# Patient Record
Sex: FEMALE | Race: WHITE | NOT HISPANIC OR LATINO | Employment: OTHER | URBAN - METROPOLITAN AREA
[De-identification: names, ages, dates, MRNs, and addresses within clinical notes are randomized per-mention and may not be internally consistent; named-entity substitution may affect disease eponyms.]

---

## 2017-06-26 ENCOUNTER — OFFICE VISIT (OUTPATIENT)
Dept: INTERNAL MEDICINE | Facility: CLINIC | Age: 72
End: 2017-06-26
Payer: MEDICARE

## 2017-06-26 ENCOUNTER — LAB VISIT (OUTPATIENT)
Dept: LAB | Facility: HOSPITAL | Age: 72
End: 2017-06-26
Attending: INTERNAL MEDICINE
Payer: MEDICARE

## 2017-06-26 VITALS
BODY MASS INDEX: 28 KG/M2 | RESPIRATION RATE: 14 BRPM | DIASTOLIC BLOOD PRESSURE: 62 MMHG | SYSTOLIC BLOOD PRESSURE: 118 MMHG | HEIGHT: 67 IN | WEIGHT: 178.38 LBS | HEART RATE: 81 BPM | TEMPERATURE: 98 F

## 2017-06-26 DIAGNOSIS — K52.9 GE (GASTROENTERITIS): Primary | ICD-10-CM

## 2017-06-26 DIAGNOSIS — Z00.00 ANNUAL PHYSICAL EXAM: ICD-10-CM

## 2017-06-26 DIAGNOSIS — K52.9 GE (GASTROENTERITIS): ICD-10-CM

## 2017-06-26 PROCEDURE — 87209 SMEAR COMPLEX STAIN: CPT

## 2017-06-26 PROCEDURE — 87046 STOOL CULTR AEROBIC BACT EA: CPT

## 2017-06-26 PROCEDURE — 99397 PER PM REEVAL EST PAT 65+ YR: CPT | Mod: S$GLB,,, | Performed by: INTERNAL MEDICINE

## 2017-06-26 PROCEDURE — 1159F MED LIST DOCD IN RCRD: CPT | Mod: S$GLB,,, | Performed by: INTERNAL MEDICINE

## 2017-06-26 PROCEDURE — 87427 SHIGA-LIKE TOXIN AG IA: CPT

## 2017-06-26 PROCEDURE — 87045 FECES CULTURE AEROBIC BACT: CPT

## 2017-06-26 PROCEDURE — 99999 PR PBB SHADOW E&M-EST. PATIENT-LVL III: CPT | Mod: PBBFAC,,, | Performed by: INTERNAL MEDICINE

## 2017-06-26 PROCEDURE — 87449 NOS EACH ORGANISM AG IA: CPT

## 2017-06-26 PROCEDURE — 1126F AMNT PAIN NOTED NONE PRSNT: CPT | Mod: S$GLB,,, | Performed by: INTERNAL MEDICINE

## 2017-06-27 ENCOUNTER — LAB VISIT (OUTPATIENT)
Dept: LAB | Facility: HOSPITAL | Age: 72
End: 2017-06-27
Attending: INTERNAL MEDICINE
Payer: MEDICARE

## 2017-06-27 DIAGNOSIS — Z00.00 ANNUAL PHYSICAL EXAM: ICD-10-CM

## 2017-06-27 LAB
ALBUMIN SERPL BCP-MCNC: 3.3 G/DL
ALP SERPL-CCNC: 80 U/L
ALT SERPL W/O P-5'-P-CCNC: 16 U/L
ANION GAP SERPL CALC-SCNC: 7 MMOL/L
AST SERPL-CCNC: 19 U/L
BASOPHILS # BLD AUTO: 0.05 K/UL
BASOPHILS NFR BLD: 0.8 %
BILIRUB SERPL-MCNC: 0.4 MG/DL
BUN SERPL-MCNC: 13 MG/DL
C DIFF GDH STL QL: NEGATIVE
C DIFF TOX A+B STL QL IA: NEGATIVE
CALCIUM SERPL-MCNC: 9.4 MG/DL
CHLORIDE SERPL-SCNC: 102 MMOL/L
CHOLEST/HDLC SERPL: 3.3 {RATIO}
CO2 SERPL-SCNC: 27 MMOL/L
CREAT SERPL-MCNC: 0.9 MG/DL
DIFFERENTIAL METHOD: ABNORMAL
EOSINOPHIL # BLD AUTO: 0.1 K/UL
EOSINOPHIL NFR BLD: 1.8 %
ERYTHROCYTE [DISTWIDTH] IN BLOOD BY AUTOMATED COUNT: 13.3 %
EST. GFR  (AFRICAN AMERICAN): >60 ML/MIN/1.73 M^2
EST. GFR  (NON AFRICAN AMERICAN): >60 ML/MIN/1.73 M^2
GLUCOSE SERPL-MCNC: 100 MG/DL
HCT VFR BLD AUTO: 40.2 %
HDL/CHOLESTEROL RATIO: 30.1 %
HDLC SERPL-MCNC: 236 MG/DL
HDLC SERPL-MCNC: 71 MG/DL
HGB BLD-MCNC: 13.9 G/DL
LDLC SERPL CALC-MCNC: 139.8 MG/DL
LYMPHOCYTES # BLD AUTO: 2.1 K/UL
LYMPHOCYTES NFR BLD: 34.5 %
MCH RBC QN AUTO: 31.4 PG
MCHC RBC AUTO-ENTMCNC: 34.6 %
MCV RBC AUTO: 91 FL
MONOCYTES # BLD AUTO: 0.5 K/UL
MONOCYTES NFR BLD: 8.2 %
NEUTROPHILS # BLD AUTO: 3.2 K/UL
NEUTROPHILS NFR BLD: 54.5 %
NONHDLC SERPL-MCNC: 165 MG/DL
O+P STL TRI STN: NORMAL
PLATELET # BLD AUTO: 250 K/UL
PMV BLD AUTO: 10.2 FL
POTASSIUM SERPL-SCNC: 5 MMOL/L
PROT SERPL-MCNC: 7.1 G/DL
RBC # BLD AUTO: 4.43 M/UL
SODIUM SERPL-SCNC: 136 MMOL/L
T4 FREE SERPL-MCNC: 0.93 NG/DL
TRIGL SERPL-MCNC: 126 MG/DL
TSH SERPL DL<=0.005 MIU/L-ACNC: 3.77 UIU/ML
WBC # BLD AUTO: 5.95 K/UL

## 2017-06-27 PROCEDURE — 80061 LIPID PANEL: CPT

## 2017-06-27 PROCEDURE — 85025 COMPLETE CBC W/AUTO DIFF WBC: CPT

## 2017-06-27 PROCEDURE — 84443 ASSAY THYROID STIM HORMONE: CPT

## 2017-06-27 PROCEDURE — 36415 COLL VENOUS BLD VENIPUNCTURE: CPT | Mod: PO

## 2017-06-27 PROCEDURE — 80053 COMPREHEN METABOLIC PANEL: CPT

## 2017-06-27 PROCEDURE — 84439 ASSAY OF FREE THYROXINE: CPT

## 2017-06-27 NOTE — PROGRESS NOTES
Subjective:       Patient ID: Mireille Akins is a 71 y.o. female.    Chief Complaint: Abdominal Pain (odor, cramping, about 2 weeks, went out of the country) and Fatigue  SUBJECTIVE:  A 71-year-old white female patient, known to me, who comes in with   a history of a recent trip to [facility] for about two weeks.  While she was there,   she developed some abdominal discomfort as did two other people on the trip with   her.  Their problems have now cleared after a few days.  Hers, however, have   continued.  She has had abdominal cramping.  She has had loose bowel movements,   not really diarrhea.  She has had cramping just prior to that.  She also has   noticed her energy has been down.  This has gone on now for 10 days.  She had   chills for a few days.  She is not aware of any fever.  There is no skin rash.    The stool does not have any blood.  I saw her  the other day.  He was   back with her today.  He is complaining of feeling poorly, but he had had a   sinobronchial infection associated with the travels, which is now clearing.  It   sounds like his problems are all related to that and inner ear problems.  The   patient has brought in her stool for evaluation.    PHYSICAL EXAMINATION:  VITAL SIGNS:  Normal including temperature.  GENERAL:  She looks healthy.  SKIN:  Shows no rash.  She has fair skin.  CHEST:  Clear.  ABDOMEN:  Not distended, tender.  Bowel sounds are active.  There is no fullness   or guarding and I probed deeply.    IMPRESSION:  Probable gastroenteritis.  Origin of that is uncertain.  With that   having gone on now for 10 days, I have set her up for a stool evaluation for   culture, ova and parasites and C. difficile.  She has not been on any   antibiotics for this.  She used Imodium on one occasion, I told her not to do   that, to be on an elemental diet.  She is soon due for her annual physical, so I   have set her up for lab for that, which will also cover this current  vent.      JDC/HN  dd: 06/26/2017 23:21:09 (CDT)  td: 07/13/2017 00:17:12 (CDT)  Doc ID   #[id number]  Job ID #[id number]    CC:     [facility] [id number]  [facility]  Review of Systems   Constitutional: Positive for chills and fatigue.   HENT: Negative for congestion, hearing loss, sinus pressure, sneezing, sore throat and voice change.    Eyes: Negative for visual disturbance.   Respiratory: Negative for cough, chest tightness and shortness of breath.    Cardiovascular: Negative.  Negative for chest pain, palpitations and leg swelling.   Gastrointestinal: Positive for abdominal distention, abdominal pain and diarrhea.   Genitourinary: Negative for difficulty urinating, dysuria, flank pain, frequency, hematuria, menstrual problem, pelvic pain, urgency, vaginal bleeding and vaginal discharge.   Musculoskeletal: Negative.  Negative for neck pain and neck stiffness.   Skin: Negative.    Neurological: Negative.  Negative for dizziness, seizures, light-headedness, numbness and headaches.   Psychiatric/Behavioral: Negative for agitation, behavioral problems, confusion and sleep disturbance. The patient is not nervous/anxious.        Objective:      Physical Exam   Constitutional: She is oriented to person, place, and time. She appears well-developed and well-nourished.   Eyes: EOM are normal. Pupils are equal, round, and reactive to light.   Neck: Normal range of motion. Neck supple. No JVD present. No thyromegaly present.   Cardiovascular: Normal rate, regular rhythm, normal heart sounds and intact distal pulses.  Exam reveals no gallop.    No murmur heard.  Pulmonary/Chest: Breath sounds normal. She has no wheezes. She has no rales.   Abdominal: Soft. Bowel sounds are normal. She exhibits no mass. There is no tenderness.   Musculoskeletal: Normal range of motion.   Lymphadenopathy:     She has no cervical adenopathy.   Neurological: She is alert and oriented to person, place, and time. She has normal reflexes. No cranial nerve deficit.    Skin: No rash noted. No erythema.   Psychiatric: She has a normal mood and affect. Judgment normal.       Assessment:       1. GE (gastroenteritis)    2. Annual physical exam        Plan:

## 2017-06-28 ENCOUNTER — PATIENT MESSAGE (OUTPATIENT)
Dept: INTERNAL MEDICINE | Facility: CLINIC | Age: 72
End: 2017-06-28

## 2017-06-28 LAB
E COLI SXT1 STL QL IA: NEGATIVE
E COLI SXT2 STL QL IA: NEGATIVE

## 2017-06-29 ENCOUNTER — TELEPHONE (OUTPATIENT)
Dept: INTERNAL MEDICINE | Facility: CLINIC | Age: 72
End: 2017-06-29

## 2017-06-29 RX ORDER — METRONIDAZOLE 500 MG/1
500 TABLET ORAL 3 TIMES DAILY
Qty: 30 TABLET | Refills: 0 | Status: SHIPPED | OUTPATIENT
Start: 2017-06-29 | End: 2017-07-07

## 2017-06-29 NOTE — TELEPHONE ENCOUNTER
----- Message from Juan Ramon Marin MD sent at 6/29/2017  7:56 AM CDT -----  Stool studies neg, lab is normal but for slight high cholesterol, how is she doing?  If not well have her see me next week

## 2017-06-30 LAB — BACTERIA STL CULT: NORMAL

## 2017-07-05 ENCOUNTER — TELEPHONE (OUTPATIENT)
Dept: INTERNAL MEDICINE | Facility: CLINIC | Age: 72
End: 2017-07-05

## 2017-07-05 DIAGNOSIS — K52.9 GASTROENTERITIS: Primary | ICD-10-CM

## 2017-07-05 NOTE — TELEPHONE ENCOUNTER
----- Message from Kanwal Castorena sent at 7/5/2017  3:40 PM CDT -----  Contact: patient [phone]  Pt saw  2 weeks ago and is still having stomach issues that she would like to discuss with [place]. Pt is aware that  is not in clinic .

## 2017-07-05 NOTE — TELEPHONE ENCOUNTER
Not doing much better. Stools are same consistency.    Now nausea for 2-3 days  Off and on. still on flagyl.  Tried with and without food.  not much of a appetite.    What is next step?  See gastro?  Was she tested for giardia?  Is also on protonix.     Please advise asap.  Thanks adrian

## 2017-07-06 ENCOUNTER — TELEPHONE (OUTPATIENT)
Dept: GASTROENTEROLOGY | Facility: CLINIC | Age: 72
End: 2017-07-06

## 2017-07-06 NOTE — TELEPHONE ENCOUNTER
Ma spoke with adrian from dr simpson office and appt was scheduled for pt for this week , adrian stated that the phone person did not offer [place] location and was eager to get off the phone ,appt schedule per her request to go to [place] on 7/7 1040am

## 2017-07-06 NOTE — TELEPHONE ENCOUNTER
gastro dept helped me book her this Friday w/ edilberto gabriel, she saw her back in 2014.  I called patient and gave date/time/location and she expressed understanding

## 2017-07-06 NOTE — TELEPHONE ENCOUNTER
----- Message from Lou Kuhn sent at 7/6/2017 10:45 AM CDT -----  Contact: gilberto/dr saad simpson - [phone]  Gastroenteritis and diarrhea - wants pt seen this week or next week - please call gilberto/dr saad simpson - [phone]

## 2017-07-06 NOTE — TELEPHONE ENCOUNTER
Dr simpson called me and said to get her in with gastro dept asap.  I called gastro/lay and they are working on fitting her in.

## 2017-07-07 ENCOUNTER — OFFICE VISIT (OUTPATIENT)
Dept: GASTROENTEROLOGY | Facility: CLINIC | Age: 72
End: 2017-07-07
Payer: MEDICARE

## 2017-07-07 VITALS
WEIGHT: 177.5 LBS | HEIGHT: 67 IN | BODY MASS INDEX: 27.86 KG/M2 | SYSTOLIC BLOOD PRESSURE: 129 MMHG | DIASTOLIC BLOOD PRESSURE: 73 MMHG

## 2017-07-07 DIAGNOSIS — R19.5 LOOSE STOOLS: Primary | ICD-10-CM

## 2017-07-07 PROCEDURE — 99999 PR PBB SHADOW E&M-EST. PATIENT-LVL II: CPT | Mod: PBBFAC,,, | Performed by: NURSE PRACTITIONER

## 2017-07-07 PROCEDURE — 1126F AMNT PAIN NOTED NONE PRSNT: CPT | Mod: S$GLB,,, | Performed by: NURSE PRACTITIONER

## 2017-07-07 PROCEDURE — 1159F MED LIST DOCD IN RCRD: CPT | Mod: S$GLB,,, | Performed by: NURSE PRACTITIONER

## 2017-07-07 PROCEDURE — 99213 OFFICE O/P EST LOW 20 MIN: CPT | Mod: S$GLB,,, | Performed by: NURSE PRACTITIONER

## 2017-07-07 NOTE — LETTER
July 7, 2017      Juan Ramon Marin MD  [address]  [place] [address]           [facility]  [address]  [place] [address]  Phone: [phone]          Patient: Mireille Akins   MR Number: [id number]   YOB: 1945   Date of Visit: 7/7/2017       Dear Dr. Juan Ramon Marin:    Thank you for referring Mireille Akins to me for evaluation. Attached you will find relevant portions of my assessment and plan of care.    If you have questions, please do not hesitate to call me. I look forward to following Mireille Akins along with you.    Sincerely,    Jenn Roper, [facility]    Enclosure  CC:  No Recipients    If you would like to receive this communication electronically, please contact [email or web address] or [phone] to request more information on [employer] Link access.    For providers and/or their staff who would like to refer a patient to [facility], please contact us through our one-stop-shop provider referral line, [facility], at [phone].    If you feel you have received this communication in error or would no longer like to receive these types of communications, please e-mail [email or web address]

## 2017-07-15 RX ORDER — PANTOPRAZOLE SODIUM 40 MG/1
TABLET, DELAYED RELEASE ORAL
Qty: 90 TABLET | Refills: 3 | Status: SHIPPED | OUTPATIENT
Start: 2017-07-15 | End: 2018-06-29 | Stop reason: SDUPTHER

## 2018-01-15 ENCOUNTER — PATIENT MESSAGE (OUTPATIENT)
Dept: INTERNAL MEDICINE | Facility: CLINIC | Age: 73
End: 2018-01-15

## 2018-01-16 ENCOUNTER — OFFICE VISIT (OUTPATIENT)
Dept: INTERNAL MEDICINE | Facility: CLINIC | Age: 73
End: 2018-01-16
Payer: MEDICARE

## 2018-01-16 VITALS
SYSTOLIC BLOOD PRESSURE: 132 MMHG | RESPIRATION RATE: 16 BRPM | DIASTOLIC BLOOD PRESSURE: 70 MMHG | HEART RATE: 80 BPM | WEIGHT: 187.38 LBS | BODY MASS INDEX: 29.41 KG/M2 | HEIGHT: 67 IN | TEMPERATURE: 99 F

## 2018-01-16 DIAGNOSIS — Z01.818 PRE-OPERATIVE EXAMINATION FOR INTERNAL MEDICINE: ICD-10-CM

## 2018-01-16 DIAGNOSIS — H66.001 ACUTE SUPPURATIVE OTITIS MEDIA OF RIGHT EAR WITHOUT SPONTANEOUS RUPTURE OF TYMPANIC MEMBRANE, RECURRENCE NOT SPECIFIED: Primary | ICD-10-CM

## 2018-01-16 DIAGNOSIS — H65.92 SEROMUCINOUS OTITIS MEDIA OF LEFT EAR: ICD-10-CM

## 2018-01-16 DIAGNOSIS — J01.40 ACUTE NON-RECURRENT PANSINUSITIS: ICD-10-CM

## 2018-01-16 DIAGNOSIS — J11.1 FLU SYNDROME: ICD-10-CM

## 2018-01-16 LAB
FLUAV AG SPEC QL IA: NEGATIVE
FLUBV AG SPEC QL IA: NEGATIVE
SPECIMEN SOURCE: NORMAL

## 2018-01-16 PROCEDURE — 96372 THER/PROPH/DIAG INJ SC/IM: CPT | Mod: S$GLB,,, | Performed by: INTERNAL MEDICINE

## 2018-01-16 PROCEDURE — 99214 OFFICE O/P EST MOD 30 MIN: CPT | Mod: 25,S$GLB,, | Performed by: INTERNAL MEDICINE

## 2018-01-16 PROCEDURE — 87400 INFLUENZA A/B EACH AG IA: CPT | Mod: 59,PO

## 2018-01-16 PROCEDURE — 99999 PR PBB SHADOW E&M-EST. PATIENT-LVL III: CPT | Mod: PBBFAC,,, | Performed by: INTERNAL MEDICINE

## 2018-01-16 RX ORDER — TRIAMCINOLONE ACETONIDE 40 MG/ML
40 INJECTION, SUSPENSION INTRA-ARTICULAR; INTRAMUSCULAR ONCE
Status: COMPLETED | OUTPATIENT
Start: 2018-01-16 | End: 2018-01-16

## 2018-01-16 RX ORDER — AZITHROMYCIN 250 MG/1
TABLET, FILM COATED ORAL
Qty: 6 TABLET | Refills: 0 | Status: SHIPPED | OUTPATIENT
Start: 2018-01-16 | End: 2018-06-18 | Stop reason: ALTCHOICE

## 2018-01-16 RX ORDER — OSELTAMIVIR PHOSPHATE 75 MG/1
75 CAPSULE ORAL 2 TIMES DAILY
Qty: 10 CAPSULE | Refills: 0 | Status: SHIPPED | OUTPATIENT
Start: 2018-01-16 | End: 2018-01-21

## 2018-01-16 RX ADMIN — TRIAMCINOLONE ACETONIDE 40 MG: 40 INJECTION, SUSPENSION INTRA-ARTICULAR; INTRAMUSCULAR at 12:01

## 2018-01-17 ENCOUNTER — TELEPHONE (OUTPATIENT)
Dept: INTERNAL MEDICINE | Facility: CLINIC | Age: 73
End: 2018-01-17

## 2018-01-20 NOTE — PROGRESS NOTES
Subjective:       Patient ID: Mireille Akins is a 72 y.o. female.    Chief Complaint: Nasal Congestion (head pressure at 7 today / pain) and Cough  A 72-year-old white female who came in with her  who stayed with her   throughout.  The patient has had exposure to a sick relative, who was a child   over the weekend and then got sick on Saturday with sore throat, hoarseness,   nasal congestion, drainage, sinus congestion drainage and eventually a cough.    She has tried a number of over-the-counter medications, which have minimally   helped.  She is having quite a bit of head pressure and feels fatigued and feels   ill.  This occurred during a flu epidemic which has been difficult to deal with   because the testing is not terribly reliable nor is the flu vaccine.  She had   not gotten the flu vaccine, her  had.    PHYSICAL EXAMINATION:  VITAL SIGNS:  normal, but she looks run down.  SKIN:  Fair and healthy.  No rash.  HEENT:  Shows otitis media on the right, serous otitis on the left, otherwise   normal.  NECK:  Shows no stiffness or adenopathy.  CHEST:  Clear with no rales, dullness or wheezing.    IMPRESSION:  Flu-like illness.  I have given her a shot of Kenalog 40 mg IM,   Z-MARY and Tamiflu.  We did a flu prep on her, which was negative.      JDC/HN  dd: 01/20/2018 15:01:08 (CST)  td: 01/20/2018 22:30:24 (CST)  Doc ID   #[id number]  Job ID #[id number]    CC:     [facility] [id number]  HPI  Review of Systems   Constitutional: Negative.    HENT: Positive for congestion, postnasal drip, rhinorrhea, sinus pain, sinus pressure, sore throat and voice change. Negative for hearing loss and sneezing.    Eyes: Negative for visual disturbance.   Respiratory: Positive for cough and shortness of breath. Negative for chest tightness.    Cardiovascular: Negative.  Negative for chest pain, palpitations and leg swelling.   Gastrointestinal: Negative.    Genitourinary: Negative for difficulty urinating, dysuria, flank pain,  frequency, hematuria, menstrual problem, pelvic pain, urgency, vaginal bleeding and vaginal discharge.   Musculoskeletal: Negative.  Negative for neck pain and neck stiffness.   Skin: Negative.    Neurological: Negative.  Negative for dizziness, seizures, light-headedness, numbness and headaches.   Psychiatric/Behavioral: Positive for sleep disturbance. Negative for agitation, behavioral problems and confusion. The patient is not nervous/anxious.        Objective:      Physical Exam   Constitutional: She is oriented to person, place, and time. She appears well-developed and well-nourished.   Eyes: EOM are normal. Pupils are equal, round, and reactive to light.   Neck: Normal range of motion. Neck supple. No JVD present. No thyromegaly present.   Cardiovascular: Normal rate, regular rhythm, normal heart sounds and intact distal pulses.  Exam reveals no gallop.    No murmur heard.  Pulmonary/Chest: Breath sounds normal. She has no wheezes. She has no rales.   Abdominal: Soft. Bowel sounds are normal. She exhibits no mass. There is no tenderness.   Musculoskeletal: Normal range of motion.   Lymphadenopathy:     She has no cervical adenopathy.   Neurological: She is alert and oriented to person, place, and time. She has normal reflexes. No cranial nerve deficit.   Skin: No rash noted. No erythema.   Psychiatric: She has a normal mood and affect. Judgment normal.       Assessment:       1. Acute suppurative otitis media of right ear without spontaneous rupture of tympanic membrane, recurrence not specified    2. Seromucinous otitis media of left ear    3. Flu syndrome    4. Acute non-recurrent pansinusitis        Plan:

## 2018-05-16 ENCOUNTER — TELEPHONE (OUTPATIENT)
Dept: INTERNAL MEDICINE | Facility: CLINIC | Age: 73
End: 2018-05-16

## 2018-05-16 DIAGNOSIS — Z00.00 ANNUAL PHYSICAL EXAM: Primary | ICD-10-CM

## 2018-05-16 DIAGNOSIS — K80.50 CHOLEDOCHOLITHIASIS: ICD-10-CM

## 2018-05-16 DIAGNOSIS — R53.81 MALAISE: ICD-10-CM

## 2018-05-16 DIAGNOSIS — E80.6 HYPERBILIRUBINEMIA: ICD-10-CM

## 2018-05-16 DIAGNOSIS — R79.9 ABNORMAL FINDING OF BLOOD CHEMISTRY: ICD-10-CM

## 2018-05-16 DIAGNOSIS — K52.9 GASTROENTERITIS: ICD-10-CM

## 2018-05-16 NOTE — TELEPHONE ENCOUNTER
----- Message from Deb Finn sent at 5/16/2018  9:50 AM CDT -----  Contact: self/ [phone]  Doctor appointment and lab have been scheduled.  Please link lab orders to the lab appointment.  Date of doctor appointment:  6/18  Physical or EP:  phys  Date of lab appointment:  6/11  Comments:

## 2018-06-11 ENCOUNTER — LAB VISIT (OUTPATIENT)
Dept: LAB | Facility: HOSPITAL | Age: 73
End: 2018-06-11
Attending: INTERNAL MEDICINE
Payer: MEDICARE

## 2018-06-11 DIAGNOSIS — K52.9 GASTROENTERITIS: ICD-10-CM

## 2018-06-11 DIAGNOSIS — K80.50 CHOLEDOCHOLITHIASIS: ICD-10-CM

## 2018-06-11 DIAGNOSIS — R79.9 ABNORMAL FINDING OF BLOOD CHEMISTRY: ICD-10-CM

## 2018-06-11 DIAGNOSIS — E80.6 HYPERBILIRUBINEMIA: ICD-10-CM

## 2018-06-11 DIAGNOSIS — R53.81 MALAISE: ICD-10-CM

## 2018-06-11 LAB
ALBUMIN SERPL BCP-MCNC: 3.5 G/DL
ALP SERPL-CCNC: 76 U/L
ALT SERPL W/O P-5'-P-CCNC: 18 U/L
ANION GAP SERPL CALC-SCNC: 8 MMOL/L
AST SERPL-CCNC: 25 U/L
BASOPHILS # BLD AUTO: 0.05 K/UL
BASOPHILS NFR BLD: 0.6 %
BILIRUB SERPL-MCNC: 0.5 MG/DL
BUN SERPL-MCNC: 13 MG/DL
CALCIUM SERPL-MCNC: 9.4 MG/DL
CHLORIDE SERPL-SCNC: 103 MMOL/L
CHOLEST SERPL-MCNC: 239 MG/DL
CHOLEST/HDLC SERPL: 2.9 {RATIO}
CO2 SERPL-SCNC: 27 MMOL/L
CREAT SERPL-MCNC: 0.8 MG/DL
DIFFERENTIAL METHOD: ABNORMAL
EOSINOPHIL # BLD AUTO: 0.1 K/UL
EOSINOPHIL NFR BLD: 1.7 %
ERYTHROCYTE [DISTWIDTH] IN BLOOD BY AUTOMATED COUNT: 13.2 %
EST. GFR  (AFRICAN AMERICAN): >60 ML/MIN/1.73 M^2
EST. GFR  (NON AFRICAN AMERICAN): >60 ML/MIN/1.73 M^2
GLUCOSE SERPL-MCNC: 110 MG/DL
HCT VFR BLD AUTO: 41.8 %
HDLC SERPL-MCNC: 82 MG/DL
HDLC SERPL: 34.3 %
HGB BLD-MCNC: 14 G/DL
IMM GRANULOCYTES # BLD AUTO: 0.02 K/UL
IMM GRANULOCYTES NFR BLD AUTO: 0.3 %
LDLC SERPL CALC-MCNC: 135.8 MG/DL
LYMPHOCYTES # BLD AUTO: 1.7 K/UL
LYMPHOCYTES NFR BLD: 21.9 %
MCH RBC QN AUTO: 31.7 PG
MCHC RBC AUTO-ENTMCNC: 33.5 G/DL
MCV RBC AUTO: 95 FL
MONOCYTES # BLD AUTO: 0.5 K/UL
MONOCYTES NFR BLD: 6.8 %
NEUTROPHILS # BLD AUTO: 5.3 K/UL
NEUTROPHILS NFR BLD: 68.7 %
NONHDLC SERPL-MCNC: 157 MG/DL
NRBC BLD-RTO: 0 /100 WBC
PLATELET # BLD AUTO: 256 K/UL
PMV BLD AUTO: 10.8 FL
POTASSIUM SERPL-SCNC: 4.4 MMOL/L
PROT SERPL-MCNC: 7 G/DL
RBC # BLD AUTO: 4.42 M/UL
SODIUM SERPL-SCNC: 138 MMOL/L
T4 FREE SERPL-MCNC: 0.95 NG/DL
TRIGL SERPL-MCNC: 106 MG/DL
TSH SERPL DL<=0.005 MIU/L-ACNC: 2.86 UIU/ML
WBC # BLD AUTO: 7.76 K/UL

## 2018-06-11 PROCEDURE — 85025 COMPLETE CBC W/AUTO DIFF WBC: CPT

## 2018-06-11 PROCEDURE — 84439 ASSAY OF FREE THYROXINE: CPT

## 2018-06-11 PROCEDURE — 84443 ASSAY THYROID STIM HORMONE: CPT

## 2018-06-11 PROCEDURE — 80053 COMPREHEN METABOLIC PANEL: CPT

## 2018-06-11 PROCEDURE — 36415 COLL VENOUS BLD VENIPUNCTURE: CPT | Mod: PO

## 2018-06-11 PROCEDURE — 80061 LIPID PANEL: CPT

## 2018-06-18 ENCOUNTER — OFFICE VISIT (OUTPATIENT)
Dept: INTERNAL MEDICINE | Facility: CLINIC | Age: 73
End: 2018-06-18
Payer: MEDICARE

## 2018-06-18 ENCOUNTER — PATIENT MESSAGE (OUTPATIENT)
Dept: INTERNAL MEDICINE | Facility: CLINIC | Age: 73
End: 2018-06-18

## 2018-06-18 VITALS
TEMPERATURE: 98 F | BODY MASS INDEX: 26.99 KG/M2 | DIASTOLIC BLOOD PRESSURE: 72 MMHG | HEART RATE: 68 BPM | RESPIRATION RATE: 16 BRPM | WEIGHT: 171.94 LBS | SYSTOLIC BLOOD PRESSURE: 118 MMHG | HEIGHT: 67 IN

## 2018-06-18 DIAGNOSIS — Z12.11 SCREEN FOR COLON CANCER: Primary | ICD-10-CM

## 2018-06-18 DIAGNOSIS — Z00.00 ANNUAL PHYSICAL EXAM: ICD-10-CM

## 2018-06-18 DIAGNOSIS — Z01.818 PRE-OPERATIVE EXAMINATION FOR INTERNAL MEDICINE: ICD-10-CM

## 2018-06-18 DIAGNOSIS — M75.111 INCOMPLETE TEAR OF RIGHT ROTATOR CUFF: ICD-10-CM

## 2018-06-18 PROCEDURE — 93010 ELECTROCARDIOGRAM REPORT: CPT | Mod: S$GLB,,, | Performed by: INTERNAL MEDICINE

## 2018-06-18 PROCEDURE — 99397 PER PM REEVAL EST PAT 65+ YR: CPT | Mod: S$GLB,,, | Performed by: INTERNAL MEDICINE

## 2018-06-18 PROCEDURE — 99999 PR PBB SHADOW E&M-EST. PATIENT-LVL III: CPT | Mod: PBBFAC,,, | Performed by: INTERNAL MEDICINE

## 2018-06-18 PROCEDURE — 93005 ELECTROCARDIOGRAM TRACING: CPT | Mod: S$GLB,,, | Performed by: INTERNAL MEDICINE

## 2018-06-18 RX ORDER — CELECOXIB 200 MG/1
1 CAPSULE ORAL DAILY PRN
COMMUNITY
Start: 2018-04-24 | End: 2019-08-06 | Stop reason: ALTCHOICE

## 2018-06-18 RX ORDER — HYDROCODONE BITARTRATE AND ACETAMINOPHEN 5; 325 MG/1; MG/1
1 TABLET ORAL
COMMUNITY
Start: 2018-06-07 | End: 2019-10-10

## 2018-06-19 ENCOUNTER — TELEPHONE (OUTPATIENT)
Dept: INTERNAL MEDICINE | Facility: CLINIC | Age: 73
End: 2018-06-19

## 2018-06-19 NOTE — TELEPHONE ENCOUNTER
I spoke to felisha. She needed the labs and ekg faxed soon.  I told her I will fax the dictation also once it is back.  I reminded dr simpson I need the dictation done today.    Fax   Felisha.  Faxing labs and ekg now.

## 2018-06-19 NOTE — TELEPHONE ENCOUNTER
----- Message from Kari Gates sent at 6/19/2018  2:08 PM CDT -----  Contact: [facility] [phone]  Caller would like a call back in regards to pt lab results. Caller states she is requesting pt lab results due to upcoming surgery. Please call and advise.

## 2018-06-24 NOTE — PROGRESS NOTES
Subjective:       Patient ID: Mireille Akins is a 72 y.o. female.    Chief Complaint: Annual Exam (colonoscopy due per peoples); Sore Throat (started this am.); and Shoulder Pain (right shoulder pain at 10.  worse after trip to [place].  orthop dr wants to so surg 6/26  dr mykel neal)  HISTORY OF PRESENT ILLNESS:  A 72-year-old white female patient coming in for   annual review and overall she is feeling well except for joint problems.  She   had blood work done, which was reviewed with her showing a mild elevation of   cholesterol, but her HDL is also high.  Rest of the lab is normal.  The patient   is a , began having shoulder pain about a month prior to this visit   and she is right handed.  She saw Dr. Mykel Neal at [facility]   and he has diagnosed her as having a torn rotator cuff and has plans for   microsurgery soon.  The patient is overdue colonoscopy.    The patient also the last day has had a sore throat.  She takes care of her   grandchildren.  She is not aware of them being sick.  She does not feel ill   otherwise.    PHYSICAL EXAMINATION:  VITAL SIGNS:  Normal.  SKIN:  Fair and healthy.  HEENT:  Clear.  NECK:  Shows no adenopathy, thyroid enlargement or bruit.  CHEST:  Clear.  HEART:  There is no murmur or gallop.  ABDOMEN:  Nontender without any organomegaly.  EXTREMITIES:  Show normal muscles, joints and pulses.  She is somewhat thickened   in her knees from arthritis in her knees as well.  NEUROLOGIC:  Appears normal cranial nerve, high integrated function, motor,   sensory.    IMPRESSION:  1.  Torn rotator of right shoulder.  2.  Upper respiratory infection, probably viral.  3.  Mild elevation of cholesterol.  4.  Needs colon evaluation, wants to do the FIT kit stool evaluation.  5.  Status post cholecystectomy.  The patient was checked for surgery and is   cleared for her orthopedic surgery as well and we will send a copy of her lab   and EKG to   Mykel Kemp for that.  We do not have his fax at this point.    She is going to call it in.      JDC/HN  dd: 06/24/2018 19:56:13 (CDT)  td: 06/24/2018 21:42:13 (CDT)  Doc ID   #[id number]  Job ID #[id number]    CC:     Dict [id number]  HPI  Review of Systems   Constitutional: Negative.    HENT: Positive for sore throat. Negative for congestion, hearing loss, sinus pressure, sneezing and voice change.    Eyes: Negative for visual disturbance.   Respiratory: Negative for cough, chest tightness and shortness of breath.    Cardiovascular: Negative.  Negative for chest pain, palpitations and leg swelling.   Gastrointestinal: Negative.    Genitourinary: Negative for difficulty urinating, dysuria, flank pain, frequency, hematuria, menstrual problem, pelvic pain, urgency, vaginal bleeding and vaginal discharge.   Musculoskeletal: Positive for arthralgias. Negative for neck pain and neck stiffness.   Skin: Negative.    Neurological: Negative.  Negative for dizziness, seizures, light-headedness, numbness and headaches.   Psychiatric/Behavioral: Negative for agitation, behavioral problems, confusion and sleep disturbance. The patient is not nervous/anxious.        Objective:      Physical Exam   Constitutional: She is oriented to person, place, and time. She appears well-developed and well-nourished.   Eyes: EOM are normal. Pupils are equal, round, and reactive to light.   Neck: Normal range of motion. Neck supple. No JVD present. No thyromegaly present.   Cardiovascular: Normal rate, regular rhythm, normal heart sounds and intact distal pulses.  Exam reveals no gallop.    No murmur heard.  Pulmonary/Chest: Breath sounds normal. She has no wheezes. She has no rales.   Abdominal: Soft. Bowel sounds are normal. She exhibits no mass. There is no tenderness.   Musculoskeletal: Normal range of motion.   Lymphadenopathy:     She has no cervical adenopathy.   Neurological: She is alert and oriented to person, place, and time. She has  normal reflexes. No cranial nerve deficit.   Skin: No rash noted. No erythema.   Psychiatric: She has a normal mood and affect. Judgment normal.       Assessment:       1. Screen for colon cancer    2. Annual physical exam    3. Pre-operative examination for internal medicine    4. Incomplete tear of right rotator cuff        Plan:

## 2018-06-29 RX ORDER — PANTOPRAZOLE SODIUM 40 MG/1
TABLET, DELAYED RELEASE ORAL
Qty: 90 TABLET | Refills: 3 | Status: SHIPPED | OUTPATIENT
Start: 2018-06-29 | End: 2019-06-26 | Stop reason: SDUPTHER

## 2018-08-13 ENCOUNTER — PATIENT MESSAGE (OUTPATIENT)
Dept: ADMINISTRATIVE | Facility: HOSPITAL | Age: 73
End: 2018-08-13

## 2018-10-23 ENCOUNTER — IMMUNIZATION (OUTPATIENT)
Dept: FAMILY MEDICINE | Facility: CLINIC | Age: 73
End: 2018-10-23
Payer: MEDICARE

## 2018-10-23 DIAGNOSIS — Z23 NEED FOR INFLUENZA VACCINATION: Primary | ICD-10-CM

## 2018-10-23 PROCEDURE — 90662 IIV NO PRSV INCREASED AG IM: CPT | Mod: PBBFAC,PO

## 2018-11-30 ENCOUNTER — LAB VISIT (OUTPATIENT)
Dept: LAB | Facility: HOSPITAL | Age: 73
End: 2018-11-30
Attending: INTERNAL MEDICINE
Payer: MEDICARE

## 2018-11-30 DIAGNOSIS — Z12.11 SCREEN FOR COLON CANCER: ICD-10-CM

## 2018-11-30 PROCEDURE — 82274 ASSAY TEST FOR BLOOD FECAL: CPT

## 2018-12-03 LAB — HEMOCCULT STL QL IA: NEGATIVE

## 2018-12-07 ENCOUNTER — TELEPHONE (OUTPATIENT)
Dept: INTERNAL MEDICINE | Facility: CLINIC | Age: 73
End: 2018-12-07

## 2018-12-07 NOTE — TELEPHONE ENCOUNTER
----- Message from Juan Ramon Marin MD sent at 12/7/2018  3:55 PM CST -----  Her fit kit is negative

## 2019-01-21 ENCOUNTER — TELEPHONE (OUTPATIENT)
Dept: INTERNAL MEDICINE | Facility: CLINIC | Age: 74
End: 2019-01-21

## 2019-01-21 RX ORDER — AZITHROMYCIN 250 MG/1
TABLET, FILM COATED ORAL
Qty: 6 TABLET | Refills: 0 | Status: SHIPPED | OUTPATIENT
Start: 2019-01-21 | End: 2019-08-06 | Stop reason: ALTCHOICE

## 2019-01-21 NOTE — TELEPHONE ENCOUNTER
You said to send zpac to pharmacy for bacterial sinusitis.  I called to ned recorder..    Please sign rx to chart.  Order pended for signature. Click sign orders I think.    Thanks adrian

## 2019-02-26 ENCOUNTER — TELEPHONE (OUTPATIENT)
Dept: INTERNAL MEDICINE | Facility: CLINIC | Age: 74
End: 2019-02-26

## 2019-02-26 DIAGNOSIS — E78.5 HYPERLIPIDEMIA, UNSPECIFIED HYPERLIPIDEMIA TYPE: Primary | ICD-10-CM

## 2019-02-26 NOTE — TELEPHONE ENCOUNTER
----- Message from Chuck Lagos sent at 2/26/2019  9:41 AM CST -----  Contact: self [phone]  Doctor appointment and lab have been scheduled.  Please link lab orders to the lab appointment.  Date of doctor appointment:  06/24  Physical or EP:  Physical   Date of lab appointment:  06/19  Comments:

## 2019-06-26 RX ORDER — PANTOPRAZOLE SODIUM 40 MG/1
TABLET, DELAYED RELEASE ORAL
Qty: 90 TABLET | Refills: 3 | Status: SHIPPED | OUTPATIENT
Start: 2019-06-26 | End: 2020-05-13

## 2019-07-23 ENCOUNTER — LAB VISIT (OUTPATIENT)
Dept: LAB | Facility: HOSPITAL | Age: 74
End: 2019-07-23
Attending: INTERNAL MEDICINE
Payer: MEDICARE

## 2019-07-23 DIAGNOSIS — E78.5 HYPERLIPIDEMIA, UNSPECIFIED HYPERLIPIDEMIA TYPE: ICD-10-CM

## 2019-07-23 LAB
ALBUMIN SERPL BCP-MCNC: 3.2 G/DL (ref 3.5–5.2)
ALP SERPL-CCNC: 80 U/L (ref 55–135)
ALT SERPL W/O P-5'-P-CCNC: 12 U/L (ref 10–44)
ANION GAP SERPL CALC-SCNC: 7 MMOL/L (ref 8–16)
AST SERPL-CCNC: 18 U/L (ref 10–40)
BASOPHILS # BLD AUTO: 0.06 K/UL (ref 0–0.2)
BASOPHILS NFR BLD: 1 % (ref 0–1.9)
BILIRUB SERPL-MCNC: 0.3 MG/DL (ref 0.1–1)
BUN SERPL-MCNC: 9 MG/DL (ref 8–23)
CALCIUM SERPL-MCNC: 9.2 MG/DL (ref 8.7–10.5)
CHLORIDE SERPL-SCNC: 102 MMOL/L (ref 95–110)
CHOLEST SERPL-MCNC: 229 MG/DL (ref 120–199)
CHOLEST/HDLC SERPL: 3 {RATIO} (ref 2–5)
CO2 SERPL-SCNC: 28 MMOL/L (ref 23–29)
CREAT SERPL-MCNC: 0.8 MG/DL (ref 0.5–1.4)
DIFFERENTIAL METHOD: ABNORMAL
EOSINOPHIL # BLD AUTO: 0.2 K/UL (ref 0–0.5)
EOSINOPHIL NFR BLD: 2.6 % (ref 0–8)
ERYTHROCYTE [DISTWIDTH] IN BLOOD BY AUTOMATED COUNT: 13.2 % (ref 11.5–14.5)
EST. GFR  (AFRICAN AMERICAN): >60 ML/MIN/1.73 M^2
EST. GFR  (NON AFRICAN AMERICAN): >60 ML/MIN/1.73 M^2
GLUCOSE SERPL-MCNC: 97 MG/DL (ref 70–110)
HCT VFR BLD AUTO: 38.9 % (ref 37–48.5)
HDLC SERPL-MCNC: 77 MG/DL (ref 40–75)
HDLC SERPL: 33.6 % (ref 20–50)
HGB BLD-MCNC: 13 G/DL (ref 12–16)
IMM GRANULOCYTES # BLD AUTO: 0.02 K/UL (ref 0–0.04)
IMM GRANULOCYTES NFR BLD AUTO: 0.3 % (ref 0–0.5)
LDLC SERPL CALC-MCNC: 124.2 MG/DL (ref 63–159)
LYMPHOCYTES # BLD AUTO: 2.3 K/UL (ref 1–4.8)
LYMPHOCYTES NFR BLD: 38.2 % (ref 18–48)
MCH RBC QN AUTO: 32.1 PG (ref 27–31)
MCHC RBC AUTO-ENTMCNC: 33.4 G/DL (ref 32–36)
MCV RBC AUTO: 96 FL (ref 82–98)
MONOCYTES # BLD AUTO: 0.5 K/UL (ref 0.3–1)
MONOCYTES NFR BLD: 7.7 % (ref 4–15)
NEUTROPHILS # BLD AUTO: 3.1 K/UL (ref 1.8–7.7)
NEUTROPHILS NFR BLD: 50.2 % (ref 38–73)
NONHDLC SERPL-MCNC: 152 MG/DL
NRBC BLD-RTO: 0 /100 WBC
PLATELET # BLD AUTO: 242 K/UL (ref 150–350)
PMV BLD AUTO: 10.6 FL (ref 9.2–12.9)
POTASSIUM SERPL-SCNC: 4.4 MMOL/L (ref 3.5–5.1)
PROT SERPL-MCNC: 6.4 G/DL (ref 6–8.4)
RBC # BLD AUTO: 4.05 M/UL (ref 4–5.4)
SODIUM SERPL-SCNC: 137 MMOL/L (ref 136–145)
T4 FREE SERPL-MCNC: 0.78 NG/DL (ref 0.71–1.51)
TRIGL SERPL-MCNC: 139 MG/DL (ref 30–150)
TSH SERPL DL<=0.005 MIU/L-ACNC: 5.57 UIU/ML (ref 0.4–4)
WBC # BLD AUTO: 6.08 K/UL (ref 3.9–12.7)

## 2019-07-23 PROCEDURE — 80061 LIPID PANEL: CPT

## 2019-07-23 PROCEDURE — 85025 COMPLETE CBC W/AUTO DIFF WBC: CPT

## 2019-07-23 PROCEDURE — 36415 COLL VENOUS BLD VENIPUNCTURE: CPT | Mod: PO

## 2019-07-23 PROCEDURE — 84439 ASSAY OF FREE THYROXINE: CPT

## 2019-07-23 PROCEDURE — 84443 ASSAY THYROID STIM HORMONE: CPT

## 2019-07-23 PROCEDURE — 80053 COMPREHEN METABOLIC PANEL: CPT

## 2019-08-06 ENCOUNTER — OFFICE VISIT (OUTPATIENT)
Dept: INTERNAL MEDICINE | Facility: CLINIC | Age: 74
End: 2019-08-06
Payer: MEDICARE

## 2019-08-06 VITALS
HEIGHT: 67 IN | WEIGHT: 185 LBS | RESPIRATION RATE: 16 BRPM | BODY MASS INDEX: 29.03 KG/M2 | SYSTOLIC BLOOD PRESSURE: 128 MMHG | HEART RATE: 68 BPM | DIASTOLIC BLOOD PRESSURE: 70 MMHG | TEMPERATURE: 98 F

## 2019-08-06 DIAGNOSIS — Z00.00 ANNUAL PHYSICAL EXAM: Primary | ICD-10-CM

## 2019-08-06 PROCEDURE — 99999 PR PBB SHADOW E&M-EST. PATIENT-LVL IV: CPT | Mod: PBBFAC,,, | Performed by: INTERNAL MEDICINE

## 2019-08-06 PROCEDURE — G0009 PNEUMOCOCCAL POLYSACCHARIDE VACCINE 23-VALENT =>2YO SQ IM: ICD-10-PCS | Mod: S$GLB,,, | Performed by: INTERNAL MEDICINE

## 2019-08-06 PROCEDURE — G0009 ADMIN PNEUMOCOCCAL VACCINE: HCPCS | Mod: S$GLB,,, | Performed by: INTERNAL MEDICINE

## 2019-08-06 PROCEDURE — 99397 PER PM REEVAL EST PAT 65+ YR: CPT | Mod: 25,S$GLB,, | Performed by: INTERNAL MEDICINE

## 2019-08-06 PROCEDURE — 90732 PPSV23 VACC 2 YRS+ SUBQ/IM: CPT | Mod: S$GLB,,, | Performed by: INTERNAL MEDICINE

## 2019-08-06 PROCEDURE — 99397 PR PREVENTIVE VISIT,EST,65 & OVER: ICD-10-PCS | Mod: 25,S$GLB,, | Performed by: INTERNAL MEDICINE

## 2019-08-06 PROCEDURE — 90732 PNEUMOCOCCAL POLYSACCHARIDE VACCINE 23-VALENT =>2YO SQ IM: ICD-10-PCS | Mod: S$GLB,,, | Performed by: INTERNAL MEDICINE

## 2019-08-06 PROCEDURE — 99999 PR PBB SHADOW E&M-EST. PATIENT-LVL IV: ICD-10-PCS | Mod: PBBFAC,,, | Performed by: INTERNAL MEDICINE

## 2019-08-07 NOTE — PROGRESS NOTES
Subjective:       Patient ID: Mireille Akins is a 74 y.o. female.    Chief Complaint: No chief complaint on file.  HISTORY OF PRESENT ILLNESS:  A 74-year-old white female patient comes in for an   annual review and has overall been feeling fairly well.  She does have some   joint problems, having had shoulder surgery since I last saw her and is seeing   Dr. Ny for her left knee.  She also sees dermatologist Alberto and   gynecologist.  The orthopedist for her shoulder was Mykel Kemp.  She had   lab done prior to this visit showing normal urinalysis, thyroid, except her TSH   is 5.57.  CBC is normal.  Lipids show cholesterol 229.  Chemistries are normal.    She did have a FIT kit in November 2018 that was negative.    PHYSICAL EXAMINATION:  SKIN:  Fair and healthy.  HEENT:  Clear.  NECK:  Shows no adenopathy, thyroid enlargement, or bruit.  CHEST:  Clear.  HEART:  There is no murmur or gallop.  ABDOMEN:  Nontender with no organomegaly, mass, fullness.  EXTREMITIES:  Show arthritic changes, left knee, right shoulder, but she is   getting around fairly well.  NEUROLOGIC:  Appears normal.    IMPRESSION:  She wanted a new gynecologist, so I have made arrangements for her   to see Dr. Tracy.  She is getting pneumococcal vaccine today and I will see her   if all is well in one year.      JDC/HN  dd: 08/21/2019 18:47:15 (CDT)  td: 08/22/2019 09:19:22 (CDT)  Doc ID   #[id number]  Job ID #[id number]    CC:     Dict [id number]  HPI  Review of Systems   Constitutional: Negative.    HENT: Negative for congestion, hearing loss, sinus pressure, sneezing, sore throat and voice change.    Eyes: Negative for visual disturbance.   Respiratory: Negative for cough, chest tightness and shortness of breath.    Cardiovascular: Negative.  Negative for chest pain, palpitations and leg swelling.   Gastrointestinal: Negative.    Genitourinary: Negative for difficulty urinating, dysuria, flank pain, frequency, hematuria, menstrual  problem, pelvic pain, urgency, vaginal bleeding and vaginal discharge.   Musculoskeletal: Positive for arthralgias and joint swelling. Negative for neck pain and neck stiffness.   Skin: Negative.    Neurological: Negative.  Negative for dizziness, seizures, light-headedness, numbness and headaches.   Psychiatric/Behavioral: Negative for agitation, behavioral problems, confusion and sleep disturbance. The patient is not nervous/anxious.        Objective:      Physical Exam   Constitutional: She is oriented to person, place, and time. She appears well-developed and well-nourished.   Eyes: Pupils are equal, round, and reactive to light. EOM are normal.   Neck: Normal range of motion. Neck supple. No JVD present. No thyromegaly present.   Cardiovascular: Normal rate, regular rhythm, normal heart sounds and intact distal pulses. Exam reveals no gallop.   No murmur heard.  Pulmonary/Chest: Breath sounds normal. She has no wheezes. She has no rales.   Abdominal: Soft. Bowel sounds are normal. She exhibits no mass. There is no tenderness.   Musculoskeletal: Normal range of motion.   Lymphadenopathy:     She has no cervical adenopathy.   Neurological: She is alert and oriented to person, place, and time. She has normal reflexes. No cranial nerve deficit.   Skin: No rash noted. No erythema.   Psychiatric: She has a normal mood and affect. Judgment normal.       Assessment:       1. Annual physical exam        Plan:

## 2019-10-08 ENCOUNTER — PATIENT OUTREACH (OUTPATIENT)
Dept: ADMINISTRATIVE | Facility: OTHER | Age: 74
End: 2019-10-08

## 2019-10-10 ENCOUNTER — OFFICE VISIT (OUTPATIENT)
Dept: OBSTETRICS AND GYNECOLOGY | Facility: CLINIC | Age: 74
End: 2019-10-10
Payer: MEDICARE

## 2019-10-10 VITALS
SYSTOLIC BLOOD PRESSURE: 118 MMHG | BODY MASS INDEX: 27.64 KG/M2 | DIASTOLIC BLOOD PRESSURE: 74 MMHG | HEIGHT: 67 IN | WEIGHT: 176.13 LBS

## 2019-10-10 DIAGNOSIS — Z90.710 S/P HYSTERECTOMY: ICD-10-CM

## 2019-10-10 DIAGNOSIS — Z12.39 BREAST CANCER SCREENING: ICD-10-CM

## 2019-10-10 DIAGNOSIS — Z01.419 ENCOUNTER FOR GYNECOLOGICAL EXAMINATION WITHOUT ABNORMAL FINDING: Primary | ICD-10-CM

## 2019-10-10 DIAGNOSIS — Z12.31 ENCOUNTER FOR SCREENING MAMMOGRAM FOR MALIGNANT NEOPLASM OF BREAST: ICD-10-CM

## 2019-10-10 DIAGNOSIS — Z78.0 POSTMENOPAUSAL: ICD-10-CM

## 2019-10-10 PROCEDURE — G0101 PR CA SCREEN;PELVIC/BREAST EXAM: ICD-10-PCS | Mod: S$GLB,,, | Performed by: OBSTETRICS & GYNECOLOGY

## 2019-10-10 PROCEDURE — 99999 PR PBB SHADOW E&M-EST. PATIENT-LVL III: ICD-10-PCS | Mod: PBBFAC,,, | Performed by: OBSTETRICS & GYNECOLOGY

## 2019-10-10 PROCEDURE — G0101 CA SCREEN;PELVIC/BREAST EXAM: HCPCS | Mod: S$GLB,,, | Performed by: OBSTETRICS & GYNECOLOGY

## 2019-10-10 PROCEDURE — 99999 PR PBB SHADOW E&M-EST. PATIENT-LVL III: CPT | Mod: PBBFAC,,, | Performed by: OBSTETRICS & GYNECOLOGY

## 2019-10-10 RX ORDER — CELECOXIB 200 MG/1
CAPSULE ORAL
COMMUNITY
End: 2021-10-12

## 2019-10-10 RX ORDER — ESTRADIOL 2 MG/1
2 TABLET ORAL DAILY
Qty: 90 TABLET | Refills: 4 | Status: SHIPPED | OUTPATIENT
Start: 2019-10-10 | End: 2020-10-15 | Stop reason: SDUPTHER

## 2019-10-10 NOTE — PROGRESS NOTES
CC: Well woman exam    Mireille Aknis is a 74 y.o. female  presents for a well woman exam.  LMP: No LMP recorded. Patient has had a hysterectomy..  No GYN  issues, problems, or complaints.      Past Medical History:   Diagnosis Date    Asthma     last attack 15 years ago    Kidney stone     PONV (postoperative nausea and vomiting)      Past Surgical History:   Procedure Laterality Date    BACK SURGERY       SECTION      CHOLECYSTECTOMY      HYSTERECTOMY      knee scoped      TONSILLECTOMY       Social History     Socioeconomic History    Marital status:      Spouse name: Not on file    Number of children: Not on file    Years of education: Not on file    Highest education level: Not on file   Occupational History    Not on file   Social Needs    Financial resource strain: Not on file    Food insecurity:     Worry: Not on file     Inability: Not on file    Transportation needs:     Medical: Not on file     Non-medical: Not on file   Tobacco Use    Smoking status: Former Smoker     Types: Cigarettes     Start date: 1965    Smokeless tobacco: Never Used    Tobacco comment: quit at age 20   Substance and Sexual Activity    Alcohol use: Yes     Comment: socially/ sometimes 1 drink a week or less    Drug use: No    Sexual activity: Yes     Partners: Male   Lifestyle    Physical activity:     Days per week: Not on file     Minutes per session: Not on file    Stress: Not on file   Relationships    Social connections:     Talks on phone: Not on file     Gets together: Not on file     Attends Sikhism service: Not on file     Active member of club or organization: Not on file     Attends meetings of clubs or organizations: Not on file     Relationship status: Not on file   Other Topics Concern    Not on file   Social History Narrative    Not on file     Family History   Problem Relation Age of Onset    Heart disease Mother     Prostate cancer Neg Hx      "Kidney disease Neg Hx      OB History        4    Para   3    Term   3            AB   1    Living   3       SAB        TAB        Ectopic        Multiple        Live Births   3                 /74   Ht 5' 7" (1.702 m)   Wt 79.9 kg (176 lb 2.4 oz)   BMI 27.59 kg/m²       ROS:    ROS:  GENERAL: Denies weight gain or weight loss. Feeling well overall.   SKIN: Denies rash or lesions.   HEAD: Denies head injury or headache.   NODES: Denies enlarged lymph nodes.   CHEST: Denies chest pain or shortness of breath.   CARDIOVASCULAR: Denies palpitations or left sided chest pain.   ABDOMEN: No abdominal pain, constipation, diarrhea, nausea, vomiting or rectal bleeding.   URINARY: No frequency, dysuria, hematuria, or burning on urination.  REPRODUCTIVE: See HPI.   BREASTS: The patient performs breast self-examination and denies pain, lumps, or nipple discharge.   HEMATOLOGIC: No easy bruisability or excessive bleeding.   MUSCULOSKELETAL: Denies joint pain or swelling.   NEUROLOGIC: Denies syncope or weakness.   PSYCHIATRIC: Denies depression, anxiety or mood swings.    PHYSICAL EXAM:    APPEARANCE: Well nourished, well developed, in no acute distress.  AFFECT: WNL, alert and oriented x 3  SKIN: No acne or hirsutism  NECK: Neck symmetric without masses or thyromegaly  NODES: No inguinal, cervical, axillary, or femoral lymph node enlargement  CHEST: Good respiratory effect  ABDOMEN: Soft.  No tenderness or masses.  No hepatosplenomegaly.  No hernias.  BREASTS: Symmetrical, no skin changes or visible lesions.  No palpable masses, nipple discharge bilaterally.  PELVIC: Normal external genitalia without lesions.  Normal hair distribution.  Adequate perineal body, normal urethral meatus.  Vagina moist and well rugated without lesions or discharge.  Cervix pink, without lesions, discharge or tenderness.  No significant cystocele or rectocele.  Bimanual exam shows uterus to be normal size, regular, mobile and " nontender.  Adnexa without masses or tenderness.    RECTAL: Rectovaginal exam confirms above with normal sphincter tone, no masses.  EXTREMITIES: No edema.    DIagnosis      ICD-10-CM ICD-9-CM    1. Encounter for gynecological examination without abnormal finding Z01.419 V72.31    2. Postmenopausal Z78.0 V49.81 estradiol (ESTRACE) 2 MG tablet   3. S/P hysterectomy Z90.710 V88.01    4. Breast cancer screening Z12.39 V76.10 Mammo Digital Screening Bilat w/ Emil   5. Encounter for screening mammogram for malignant neoplasm of breast  Z12.31 V76.12 Mammo Digital Screening Bilat w/ Emil       A full discussion of the benefit-risk ratio of hormonal replacement therapy was carried out. Improvement in vasomotor and other climacteric symptoms is discussed, including possible improvements in sleep and mood. Reduction of risk for osteoporosis was explained. We discussed the study data showing increased risk of thrombo-embolic events such as myocardial infarction, stroke and also possibly breast cancer with estrogen replacement, and how this might affect her. The range of side effects such as breast tenderness, weight gain and including possible increases in lifetime risk of breast cancer and possible thrombotic complications was discussed. We also discussed ACOG's recommendation to use hormone replacement therapy for the relief of hot flashes alone and to be on the lowest dose possible for the shortest amount of time.  Alternative such as herbal and soy-based products were reviewed. All of her questions about this therapy were answered.    Patient was counseled today on A.C.S. Pap guidelines and recommendations for yearly pelvic exams, mammograms and monthly self breast exams; to see her PCP for other health maintenance.     Follow up in about 1 year (around 10/10/2020).

## 2019-10-14 ENCOUNTER — TELEPHONE (OUTPATIENT)
Dept: OBSTETRICS AND GYNECOLOGY | Facility: CLINIC | Age: 74
End: 2019-10-14

## 2019-10-14 NOTE — TELEPHONE ENCOUNTER
----- Message from Niurka Wright sent at 10/14/2019  4:49 PM CDT -----  Contact: SOPHIE MEDRANO [[id number]]  Name of Who is Calling: SOPHIE MEDRANO [[id number]]      What is the request in detail: Pt states she needs an order for a mammogram placed in the system. Please contact to further discuss and advise. Pt is requesting a paper form of order to have completed outside of [facility].         Can the clinic reply by [facility]: Y      What Number to Call Back if not in [facility]: [phone]

## 2019-10-24 ENCOUNTER — CLINICAL SUPPORT (OUTPATIENT)
Dept: FAMILY MEDICINE | Facility: CLINIC | Age: 74
End: 2019-10-24
Payer: MEDICARE

## 2019-10-24 DIAGNOSIS — Z23 FLU VACCINE NEED: Primary | ICD-10-CM

## 2019-10-24 PROCEDURE — 90662 IIV NO PRSV INCREASED AG IM: CPT | Mod: S$GLB,,, | Performed by: INTERNAL MEDICINE

## 2019-10-24 PROCEDURE — 90662 FLU VACCINE - HIGH DOSE (65+) PRESERVATIVE FREE IM: ICD-10-PCS | Mod: S$GLB,,, | Performed by: INTERNAL MEDICINE

## 2019-10-24 PROCEDURE — G0008 ADMIN INFLUENZA VIRUS VAC: HCPCS | Mod: S$GLB,,, | Performed by: INTERNAL MEDICINE

## 2019-10-24 PROCEDURE — G0008 FLU VACCINE - HIGH DOSE (65+) PRESERVATIVE FREE IM: ICD-10-PCS | Mod: S$GLB,,, | Performed by: INTERNAL MEDICINE

## 2019-10-24 NOTE — PROGRESS NOTES
Patient arrived to clinic for high dose vaccination.  Administered to left deltoid.  Patient tolerated. Band-aid applied.

## 2019-11-29 ENCOUNTER — PATIENT OUTREACH (OUTPATIENT)
Dept: ADMINISTRATIVE | Facility: OTHER | Age: 74
End: 2019-11-29

## 2019-12-27 DIAGNOSIS — Z12.11 COLON CANCER SCREENING: ICD-10-CM

## 2020-05-07 ENCOUNTER — TELEPHONE (OUTPATIENT)
Dept: INTERNAL MEDICINE | Facility: CLINIC | Age: 75
End: 2020-05-07

## 2020-05-07 DIAGNOSIS — T14.8XXA WOUND INFECTION: ICD-10-CM

## 2020-05-07 DIAGNOSIS — L08.9 WOUND INFECTION: ICD-10-CM

## 2020-05-07 DIAGNOSIS — S81.811A SKIN TEAR OF RIGHT LOWER LEG WITHOUT COMPLICATION, INITIAL ENCOUNTER: Primary | ICD-10-CM

## 2020-05-07 RX ORDER — AZITHROMYCIN 250 MG/1
TABLET, FILM COATED ORAL
Qty: 6 TABLET | Refills: 0 | Status: SHIPPED | OUTPATIENT
Start: 2020-05-07 | End: 2020-05-13 | Stop reason: SDUPTHER

## 2020-05-12 DIAGNOSIS — L08.9 WOUND INFECTION: ICD-10-CM

## 2020-05-12 DIAGNOSIS — T14.8XXA WOUND INFECTION: ICD-10-CM

## 2020-05-12 DIAGNOSIS — S81.811A SKIN TEAR OF RIGHT LOWER LEG WITHOUT COMPLICATION, INITIAL ENCOUNTER: ICD-10-CM

## 2020-05-13 RX ORDER — AZITHROMYCIN 250 MG/1
TABLET, FILM COATED ORAL
Qty: 6 TABLET | Refills: 0 | Status: SHIPPED | OUTPATIENT
Start: 2020-05-13 | End: 2021-10-12

## 2020-05-13 RX ORDER — PANTOPRAZOLE SODIUM 40 MG/1
TABLET, DELAYED RELEASE ORAL
Qty: 90 TABLET | Refills: 3 | Status: SHIPPED | OUTPATIENT
Start: 2020-05-13 | End: 2021-05-07

## 2020-08-04 ENCOUNTER — TELEPHONE (OUTPATIENT)
Dept: INTERNAL MEDICINE | Facility: CLINIC | Age: 75
End: 2020-08-04

## 2020-08-04 DIAGNOSIS — E78.5 HYPERLIPIDEMIA, UNSPECIFIED HYPERLIPIDEMIA TYPE: ICD-10-CM

## 2020-08-04 DIAGNOSIS — R10.13 EPIGASTRIC PAIN: ICD-10-CM

## 2020-08-04 DIAGNOSIS — M17.10 KNEE ARTHROPATHY: ICD-10-CM

## 2020-08-04 DIAGNOSIS — Z00.00 ANNUAL VISIT FOR GENERAL ADULT MEDICAL EXAMINATION WITHOUT ABNORMAL FINDINGS: Primary | ICD-10-CM

## 2020-08-04 NOTE — TELEPHONE ENCOUNTER
Left msg apologizing we were not sent msg to enter lab orders but they are in now and please advise when she wants to come for blood and urine testing.  Can msg me on portal if prefer.    Does [place].

## 2020-08-06 ENCOUNTER — LAB VISIT (OUTPATIENT)
Dept: LAB | Facility: HOSPITAL | Age: 75
End: 2020-08-06
Attending: INTERNAL MEDICINE
Payer: MEDICARE

## 2020-08-06 DIAGNOSIS — R10.13 EPIGASTRIC PAIN: ICD-10-CM

## 2020-08-06 DIAGNOSIS — M17.10 KNEE ARTHROPATHY: ICD-10-CM

## 2020-08-06 DIAGNOSIS — Z00.00 ANNUAL VISIT FOR GENERAL ADULT MEDICAL EXAMINATION WITHOUT ABNORMAL FINDINGS: ICD-10-CM

## 2020-08-06 DIAGNOSIS — E78.5 HYPERLIPIDEMIA, UNSPECIFIED HYPERLIPIDEMIA TYPE: ICD-10-CM

## 2020-08-06 LAB
ALBUMIN SERPL BCP-MCNC: 3.6 G/DL (ref 3.5–5.2)
ALP SERPL-CCNC: 72 U/L (ref 55–135)
ALT SERPL W/O P-5'-P-CCNC: 15 U/L (ref 10–44)
ANION GAP SERPL CALC-SCNC: 4 MMOL/L (ref 8–16)
AST SERPL-CCNC: 23 U/L (ref 10–40)
BASOPHILS # BLD AUTO: 0.06 K/UL (ref 0–0.2)
BASOPHILS NFR BLD: 1.2 % (ref 0–1.9)
BILIRUB SERPL-MCNC: 0.4 MG/DL (ref 0.1–1)
BUN SERPL-MCNC: 13 MG/DL (ref 8–23)
CALCIUM SERPL-MCNC: 9.1 MG/DL (ref 8.7–10.5)
CHLORIDE SERPL-SCNC: 98 MMOL/L (ref 95–110)
CHOLEST SERPL-MCNC: 253 MG/DL (ref 120–199)
CHOLEST/HDLC SERPL: 2.8 {RATIO} (ref 2–5)
CO2 SERPL-SCNC: 31 MMOL/L (ref 23–29)
CREAT SERPL-MCNC: 0.9 MG/DL (ref 0.5–1.4)
DIFFERENTIAL METHOD: ABNORMAL
EOSINOPHIL # BLD AUTO: 0.1 K/UL (ref 0–0.5)
EOSINOPHIL NFR BLD: 2.6 % (ref 0–8)
ERYTHROCYTE [DISTWIDTH] IN BLOOD BY AUTOMATED COUNT: 13.2 % (ref 11.5–14.5)
EST. GFR  (AFRICAN AMERICAN): >60 ML/MIN/1.73 M^2
EST. GFR  (NON AFRICAN AMERICAN): >60 ML/MIN/1.73 M^2
GLUCOSE SERPL-MCNC: 109 MG/DL (ref 70–110)
HCT VFR BLD AUTO: 41.4 % (ref 37–48.5)
HDLC SERPL-MCNC: 89 MG/DL (ref 40–75)
HDLC SERPL: 35.2 % (ref 20–50)
HGB BLD-MCNC: 13.7 G/DL (ref 12–16)
IMM GRANULOCYTES # BLD AUTO: 0.02 K/UL (ref 0–0.04)
IMM GRANULOCYTES NFR BLD AUTO: 0.4 % (ref 0–0.5)
LDLC SERPL CALC-MCNC: 141 MG/DL (ref 63–159)
LYMPHOCYTES # BLD AUTO: 2 K/UL (ref 1–4.8)
LYMPHOCYTES NFR BLD: 39.6 % (ref 18–48)
MCH RBC QN AUTO: 31.1 PG (ref 27–31)
MCHC RBC AUTO-ENTMCNC: 33.1 G/DL (ref 32–36)
MCV RBC AUTO: 94 FL (ref 82–98)
MONOCYTES # BLD AUTO: 0.5 K/UL (ref 0.3–1)
MONOCYTES NFR BLD: 9.5 % (ref 4–15)
NEUTROPHILS # BLD AUTO: 2.3 K/UL (ref 1.8–7.7)
NEUTROPHILS NFR BLD: 46.7 % (ref 38–73)
NONHDLC SERPL-MCNC: 164 MG/DL
NRBC BLD-RTO: 0 /100 WBC
PLATELET # BLD AUTO: 247 K/UL (ref 150–350)
PMV BLD AUTO: 10.8 FL (ref 9.2–12.9)
POTASSIUM SERPL-SCNC: 4 MMOL/L (ref 3.5–5.1)
PROT SERPL-MCNC: 7.1 G/DL (ref 6–8.4)
RBC # BLD AUTO: 4.4 M/UL (ref 4–5.4)
SODIUM SERPL-SCNC: 133 MMOL/L (ref 136–145)
T4 FREE SERPL-MCNC: 0.78 NG/DL (ref 0.71–1.51)
TRIGL SERPL-MCNC: 115 MG/DL (ref 30–150)
TSH SERPL DL<=0.005 MIU/L-ACNC: 5.37 UIU/ML (ref 0.4–4)
WBC # BLD AUTO: 4.93 K/UL (ref 3.9–12.7)

## 2020-08-06 PROCEDURE — 85025 COMPLETE CBC W/AUTO DIFF WBC: CPT

## 2020-08-06 PROCEDURE — 36415 COLL VENOUS BLD VENIPUNCTURE: CPT | Mod: PO

## 2020-08-06 PROCEDURE — 84439 ASSAY OF FREE THYROXINE: CPT

## 2020-08-06 PROCEDURE — 84443 ASSAY THYROID STIM HORMONE: CPT

## 2020-08-06 PROCEDURE — 80053 COMPREHEN METABOLIC PANEL: CPT

## 2020-08-06 PROCEDURE — 80061 LIPID PANEL: CPT

## 2020-08-10 ENCOUNTER — OFFICE VISIT (OUTPATIENT)
Dept: INTERNAL MEDICINE | Facility: CLINIC | Age: 75
End: 2020-08-10
Payer: MEDICARE

## 2020-08-10 ENCOUNTER — TELEPHONE (OUTPATIENT)
Dept: INTERNAL MEDICINE | Facility: CLINIC | Age: 75
End: 2020-08-10

## 2020-08-10 DIAGNOSIS — Z00.00 ANNUAL VISIT FOR GENERAL ADULT MEDICAL EXAMINATION WITHOUT ABNORMAL FINDINGS: ICD-10-CM

## 2020-08-10 DIAGNOSIS — E78.5 HYPERLIPIDEMIA, UNSPECIFIED HYPERLIPIDEMIA TYPE: ICD-10-CM

## 2020-08-10 DIAGNOSIS — M17.10 KNEE ARTHROPATHY: Primary | ICD-10-CM

## 2020-08-10 DIAGNOSIS — E03.9 ACQUIRED HYPOTHYROIDISM: ICD-10-CM

## 2020-08-10 PROCEDURE — 1101F PT FALLS ASSESS-DOCD LE1/YR: CPT | Mod: CPTII,95,, | Performed by: INTERNAL MEDICINE

## 2020-08-10 PROCEDURE — 1159F MED LIST DOCD IN RCRD: CPT | Mod: 95,,, | Performed by: INTERNAL MEDICINE

## 2020-08-10 PROCEDURE — 1101F PR PT FALLS ASSESS DOC 0-1 FALLS W/OUT INJ PAST YR: ICD-10-PCS | Mod: CPTII,95,, | Performed by: INTERNAL MEDICINE

## 2020-08-10 PROCEDURE — 1159F PR MEDICATION LIST DOCUMENTED IN MEDICAL RECORD: ICD-10-PCS | Mod: 95,,, | Performed by: INTERNAL MEDICINE

## 2020-08-10 PROCEDURE — 99442 PR PHYSICIAN TELEPHONE EVALUATION 11-20 MIN: CPT | Mod: 95,,, | Performed by: INTERNAL MEDICINE

## 2020-08-10 PROCEDURE — 99442 PR PHYSICIAN TELEPHONE EVALUATION 11-20 MIN: ICD-10-PCS | Mod: 95,,, | Performed by: INTERNAL MEDICINE

## 2020-08-10 RX ORDER — LEVOTHYROXINE SODIUM 25 UG/1
25 TABLET ORAL
Qty: 90 TABLET | Refills: 3 | Status: SHIPPED | OUTPATIENT
Start: 2020-08-10 | End: 2021-10-12

## 2020-08-10 NOTE — PROGRESS NOTES
Established Patient - Audio Only Telehealth Visit     The patient location is: home  The chief complaint leading to consultation is: annual review  Visit type: Virtual visit with audio only (telephone)  Total time spent with patient: 20 min       The reason for the audio only service rather than synchronous audio and video virtual visit was related to technical difficulties or patient preference/necessity.     Each patient to whom I provide medical services by telemedicine is:  (1) informed of the relationship between the physician and patient and the respective role of any other health care provider with respect to management of the patient; and (2) notified that they may decline to receive medical services by telemedicine and may withdraw from such care at any time. Patient verbally consented to receive this service via voice-only telephone call.       HPI:  75-year-old white female patient mine calling in for annual review and is overall feeling very well.  She still recovering from knee surgery and doing physical therapy with progressive improvement.  She had blood work done prior to the visit which was essentially normal with the exception of a slightly elevated TSH.  She has had elevations over the last few years.  She has gained some weight since the COVID epidemic and her knee surgery and finds it difficult to get that off.  This most likely represents a decrease in her activity since she was a very active  prior to the knee injury.    Physical exam:  Not done since this is a virtual visit    Impression:  1.  Requiring from knee surgery-proceeding well  2.  Elevated TSH-likely this is due to hypothyroidism from aging so I discussed this with her and placed her on Synthroid 0.  025 mg and will recheck her TSH and T4 in 3 months  3.  Gynecologic evaluations done by Dr. Tracy    Plan:  1.  As above 2.  I will see her again in 3 months to go over the treatment of her hypothyroidism following lab.                         This service was not originating from a related E/M service provided within the previous 7 days nor will  to an E/M service or procedure within the next 24 hours or my soonest available appointment.  Prevailing standard of care was able to be met in this audio-only visit.

## 2020-10-13 ENCOUNTER — PATIENT OUTREACH (OUTPATIENT)
Dept: ADMINISTRATIVE | Facility: OTHER | Age: 75
End: 2020-10-13

## 2020-10-15 ENCOUNTER — OFFICE VISIT (OUTPATIENT)
Dept: OBSTETRICS AND GYNECOLOGY | Facility: CLINIC | Age: 75
End: 2020-10-15
Payer: MEDICARE

## 2020-10-15 VITALS
BODY MASS INDEX: 29.18 KG/M2 | SYSTOLIC BLOOD PRESSURE: 132 MMHG | WEIGHT: 185.94 LBS | DIASTOLIC BLOOD PRESSURE: 80 MMHG | HEIGHT: 67 IN

## 2020-10-15 DIAGNOSIS — Z90.710 S/P HYSTERECTOMY: ICD-10-CM

## 2020-10-15 DIAGNOSIS — Z12.31 OTHER SCREENING MAMMOGRAM: ICD-10-CM

## 2020-10-15 DIAGNOSIS — Z01.419 ENCOUNTER FOR GYNECOLOGICAL EXAMINATION WITHOUT ABNORMAL FINDING: Primary | ICD-10-CM

## 2020-10-15 DIAGNOSIS — Z78.0 POSTMENOPAUSAL: ICD-10-CM

## 2020-10-15 PROCEDURE — 99999 PR PBB SHADOW E&M-EST. PATIENT-LVL III: CPT | Mod: PBBFAC,,, | Performed by: OBSTETRICS & GYNECOLOGY

## 2020-10-15 PROCEDURE — 99999 PR PBB SHADOW E&M-EST. PATIENT-LVL III: ICD-10-PCS | Mod: PBBFAC,,, | Performed by: OBSTETRICS & GYNECOLOGY

## 2020-10-15 PROCEDURE — G0101 CA SCREEN;PELVIC/BREAST EXAM: HCPCS | Mod: S$GLB,,, | Performed by: OBSTETRICS & GYNECOLOGY

## 2020-10-15 PROCEDURE — G0101 PR CA SCREEN;PELVIC/BREAST EXAM: ICD-10-PCS | Mod: S$GLB,,, | Performed by: OBSTETRICS & GYNECOLOGY

## 2020-10-15 RX ORDER — ESTRADIOL 2 MG/1
2 TABLET ORAL DAILY
Qty: 90 TABLET | Refills: 4 | Status: SHIPPED | OUTPATIENT
Start: 2020-10-15 | End: 2021-01-02

## 2020-10-15 NOTE — PROGRESS NOTES
"CC: Well woman exam    Mireille Akins is a 75 y.o. female  presents for a well woman exam.  She would like a refill on her estrogen.  No symptoms .         Past Medical History:   Diagnosis Date    Asthma     last attack 15 years ago    Kidney stone     PONV (postoperative nausea and vomiting)      Past Surgical History:   Procedure Laterality Date    BACK SURGERY       SECTION      CHOLECYSTECTOMY      HYSTERECTOMY      knee scoped      TONSILLECTOMY       Family History   Problem Relation Age of Onset    Heart disease Mother     Prostate cancer Neg Hx     Kidney disease Neg Hx      Social History     Tobacco Use    Smoking status: Former Smoker     Types: Cigarettes     Start date: 1965    Smokeless tobacco: Never Used    Tobacco comment: quit at age 20   Substance Use Topics    Alcohol use: Yes     Comment: socially/ sometimes 1 drink a week or less    Drug use: No     OB History        4    Para   3    Term   3            AB   1    Living   3       SAB        TAB        Ectopic        Multiple        Live Births   3                 /80   Ht 5' 7" (1.702 m)   Wt 84.4 kg (185 lb 15.3 oz)   LMP  (LMP Unknown)   BMI 29.13 kg/m²     ROS:  GENERAL: Denies weight gain or weight loss. Feeling well overall.   SKIN: Denies rash or lesions.   HEAD: Denies head injury or headache.   NODES: Denies enlarged lymph nodes.   CHEST: Denies chest pain or shortness of breath.   CARDIOVASCULAR: Denies palpitations or left sided chest pain.   ABDOMEN: No abdominal pain, constipation, diarrhea, nausea, vomiting or rectal bleeding.   URINARY: No frequency, dysuria, hematuria, or burning on urination.  REPRODUCTIVE: See HPI.   BREASTS: The patient performs breast self-examination and denies pain, lumps, or nipple discharge.   HEMATOLOGIC: No easy bruisability or excessive bleeding.   MUSCULOSKELETAL: Denies joint pain or swelling.   NEUROLOGIC: Denies syncope or " weakness.   PSYCHIATRIC: Denies depression, anxiety or mood swings.    PE:   APPEARANCE: Well nourished, well developed, in no acute distress.  AFFECT: WNL, alert and oriented x 3.  SKIN: No acne or hirsutism.  NECK: Neck symmetric without masses or thyromegaly.  NODES: No inguinal, cervical, axillary or femoral lymph node enlargement.  CHEST: Good respiratory effort.   ABDOMEN: Soft. No tenderness or masses. No hepatosplenomegaly. No hernias.  BREASTS: Symmetrical, no skin changes or visible lesions. No palpable masses, nipple discharge bilaterally.  PELVIC: Normal external female genitalia without lesions. Normal hair distribution. Adequate perineal body, normal urethral meatus. Vagina atrophic without lesions or discharge. No significant cystocele or rectocele. Bimanual exam shows uterus and cervix to be surgically absent. Adnexa without masses or tenderness.  RECTAL: Rectovaginal exam confirms above with normal sphincter tone, no masses.  EXTREMITIES: No edema.    Diagnosis      ICD-10-CM ICD-9-CM    1. Encounter for gynecological examination without abnormal finding  Z01.419 V72.31    2. S/P hysterectomy  Z90.710 V88.01    3. Postmenopausal  Z78.0 V49.81 estradioL (ESTRACE) 2 MG tablet   4. Other screening mammogram  Z12.31 V76.12 Mammo Digital Screening Bilat w/ Emil           Patient was counseled today on A.C.S. Pap guidelines and recommendations for yearly pelvic exams, mammograms and monthly self breast exams; to see her PCP for other health maintenance.     No follow-ups on file.

## 2020-10-30 ENCOUNTER — PATIENT MESSAGE (OUTPATIENT)
Dept: ADMINISTRATIVE | Facility: HOSPITAL | Age: 75
End: 2020-10-30

## 2020-12-30 DIAGNOSIS — Z12.11 COLON CANCER SCREENING: ICD-10-CM

## 2021-01-04 ENCOUNTER — PATIENT MESSAGE (OUTPATIENT)
Dept: ADMINISTRATIVE | Facility: HOSPITAL | Age: 76
End: 2021-01-04

## 2021-01-04 ENCOUNTER — IMMUNIZATION (OUTPATIENT)
Dept: INTERNAL MEDICINE | Facility: CLINIC | Age: 76
End: 2021-01-04
Payer: MEDICARE

## 2021-01-04 DIAGNOSIS — Z23 NEED FOR VACCINATION: ICD-10-CM

## 2021-01-04 PROCEDURE — 91300 COVID-19, MRNA, LNP-S, PF, 30 MCG/0.3 ML DOSE VACCINE: CPT | Mod: PBBFAC | Performed by: FAMILY MEDICINE

## 2021-01-25 ENCOUNTER — IMMUNIZATION (OUTPATIENT)
Dept: INTERNAL MEDICINE | Facility: CLINIC | Age: 76
End: 2021-01-25
Payer: MEDICARE

## 2021-01-25 DIAGNOSIS — Z23 NEED FOR VACCINATION: Primary | ICD-10-CM

## 2021-01-25 PROCEDURE — 91300 COVID-19, MRNA, LNP-S, PF, 30 MCG/0.3 ML DOSE VACCINE: CPT | Mod: PBBFAC | Performed by: FAMILY MEDICINE

## 2021-01-25 PROCEDURE — 0002A COVID-19, MRNA, LNP-S, PF, 30 MCG/0.3 ML DOSE VACCINE: CPT | Mod: PBBFAC | Performed by: FAMILY MEDICINE

## 2021-04-05 ENCOUNTER — PATIENT MESSAGE (OUTPATIENT)
Dept: ADMINISTRATIVE | Facility: HOSPITAL | Age: 76
End: 2021-04-05

## 2021-06-29 ENCOUNTER — TELEPHONE (OUTPATIENT)
Dept: FAMILY MEDICINE | Facility: CLINIC | Age: 76
End: 2021-06-29

## 2021-07-06 ENCOUNTER — PATIENT MESSAGE (OUTPATIENT)
Dept: ADMINISTRATIVE | Facility: HOSPITAL | Age: 76
End: 2021-07-06

## 2021-08-24 ENCOUNTER — PATIENT MESSAGE (OUTPATIENT)
Dept: ADMINISTRATIVE | Facility: HOSPITAL | Age: 76
End: 2021-08-24

## 2021-08-24 ENCOUNTER — PATIENT OUTREACH (OUTPATIENT)
Dept: ADMINISTRATIVE | Facility: HOSPITAL | Age: 76
End: 2021-08-24

## 2021-10-04 ENCOUNTER — PATIENT MESSAGE (OUTPATIENT)
Dept: ADMINISTRATIVE | Facility: HOSPITAL | Age: 76
End: 2021-10-04

## 2021-10-12 ENCOUNTER — OFFICE VISIT (OUTPATIENT)
Dept: INTERNAL MEDICINE | Facility: CLINIC | Age: 76
End: 2021-10-12
Payer: MEDICARE

## 2021-10-12 ENCOUNTER — LAB VISIT (OUTPATIENT)
Dept: LAB | Facility: HOSPITAL | Age: 76
End: 2021-10-12
Attending: INTERNAL MEDICINE
Payer: MEDICARE

## 2021-10-12 VITALS
OXYGEN SATURATION: 98 % | HEIGHT: 67 IN | HEART RATE: 78 BPM | DIASTOLIC BLOOD PRESSURE: 80 MMHG | BODY MASS INDEX: 28.89 KG/M2 | WEIGHT: 184.06 LBS | SYSTOLIC BLOOD PRESSURE: 112 MMHG

## 2021-10-12 DIAGNOSIS — Z13.220 ENCOUNTER FOR LIPID SCREENING FOR CARDIOVASCULAR DISEASE: ICD-10-CM

## 2021-10-12 DIAGNOSIS — Z13.6 ENCOUNTER FOR LIPID SCREENING FOR CARDIOVASCULAR DISEASE: ICD-10-CM

## 2021-10-12 DIAGNOSIS — M81.0 POSTMENOPAUSAL BONE LOSS: ICD-10-CM

## 2021-10-12 DIAGNOSIS — Z12.11 COLON CANCER SCREENING: ICD-10-CM

## 2021-10-12 DIAGNOSIS — Z67.91 UNSPECIFIED BLOOD TYPE, RH NEGATIVE: ICD-10-CM

## 2021-10-12 DIAGNOSIS — Z78.0 POSTMENOPAUSAL: ICD-10-CM

## 2021-10-12 DIAGNOSIS — Z11.59 NEED FOR HEPATITIS C SCREENING TEST: ICD-10-CM

## 2021-10-12 DIAGNOSIS — E03.8 SUBCLINICAL HYPOTHYROIDISM: ICD-10-CM

## 2021-10-12 DIAGNOSIS — Z00.00 ANNUAL PHYSICAL EXAM: Primary | ICD-10-CM

## 2021-10-12 LAB
BASOPHILS # BLD AUTO: 0.04 K/UL (ref 0–0.2)
BASOPHILS NFR BLD: 0.7 % (ref 0–1.9)
DIFFERENTIAL METHOD: ABNORMAL
EOSINOPHIL # BLD AUTO: 0.1 K/UL (ref 0–0.5)
EOSINOPHIL NFR BLD: 2.2 % (ref 0–8)
ERYTHROCYTE [DISTWIDTH] IN BLOOD BY AUTOMATED COUNT: 13 % (ref 11.5–14.5)
HCT VFR BLD AUTO: 38.8 % (ref 37–48.5)
HCV AB SERPL QL IA: NEGATIVE
HGB BLD-MCNC: 13.1 G/DL (ref 12–16)
IMM GRANULOCYTES # BLD AUTO: 0.01 K/UL (ref 0–0.04)
IMM GRANULOCYTES NFR BLD AUTO: 0.2 % (ref 0–0.5)
LYMPHOCYTES # BLD AUTO: 2 K/UL (ref 1–4.8)
LYMPHOCYTES NFR BLD: 33.1 % (ref 18–48)
MCH RBC QN AUTO: 31.4 PG (ref 27–31)
MCHC RBC AUTO-ENTMCNC: 33.8 G/DL (ref 32–36)
MCV RBC AUTO: 93 FL (ref 82–98)
MONOCYTES # BLD AUTO: 0.5 K/UL (ref 0.3–1)
MONOCYTES NFR BLD: 7.7 % (ref 4–15)
NEUTROPHILS # BLD AUTO: 3.4 K/UL (ref 1.8–7.7)
NEUTROPHILS NFR BLD: 56.1 % (ref 38–73)
NRBC BLD-RTO: 0 /100 WBC
PLATELET # BLD AUTO: 237 K/UL (ref 150–450)
PMV BLD AUTO: 10.7 FL (ref 9.2–12.9)
RBC # BLD AUTO: 4.17 M/UL (ref 4–5.4)
WBC # BLD AUTO: 5.98 K/UL (ref 3.9–12.7)

## 2021-10-12 PROCEDURE — 84443 ASSAY THYROID STIM HORMONE: CPT | Performed by: INTERNAL MEDICINE

## 2021-10-12 PROCEDURE — 82274 ASSAY TEST FOR BLOOD FECAL: CPT | Performed by: INTERNAL MEDICINE

## 2021-10-12 PROCEDURE — 99999 PR PBB SHADOW E&M-EST. PATIENT-LVL III: ICD-10-PCS | Mod: PBBFAC,,, | Performed by: INTERNAL MEDICINE

## 2021-10-12 PROCEDURE — 3288F FALL RISK ASSESSMENT DOCD: CPT | Mod: CPTII,S$GLB,, | Performed by: INTERNAL MEDICINE

## 2021-10-12 PROCEDURE — 86803 HEPATITIS C AB TEST: CPT | Performed by: INTERNAL MEDICINE

## 2021-10-12 PROCEDURE — 80053 COMPREHEN METABOLIC PANEL: CPT | Performed by: INTERNAL MEDICINE

## 2021-10-12 PROCEDURE — 1126F AMNT PAIN NOTED NONE PRSNT: CPT | Mod: CPTII,S$GLB,, | Performed by: INTERNAL MEDICINE

## 2021-10-12 PROCEDURE — 3008F BODY MASS INDEX DOCD: CPT | Mod: CPTII,S$GLB,, | Performed by: INTERNAL MEDICINE

## 2021-10-12 PROCEDURE — 3074F SYST BP LT 130 MM HG: CPT | Mod: CPTII,S$GLB,, | Performed by: INTERNAL MEDICINE

## 2021-10-12 PROCEDURE — 99499 UNLISTED E&M SERVICE: CPT | Mod: S$GLB,,, | Performed by: INTERNAL MEDICINE

## 2021-10-12 PROCEDURE — 99213 PR OFFICE/OUTPT VISIT, EST, LEVL III, 20-29 MIN: ICD-10-PCS | Mod: S$GLB,,, | Performed by: INTERNAL MEDICINE

## 2021-10-12 PROCEDURE — 80061 LIPID PANEL: CPT | Performed by: INTERNAL MEDICINE

## 2021-10-12 PROCEDURE — 3079F PR MOST RECENT DIASTOLIC BLOOD PRESSURE 80-89 MM HG: ICD-10-PCS | Mod: CPTII,S$GLB,, | Performed by: INTERNAL MEDICINE

## 2021-10-12 PROCEDURE — 3074F PR MOST RECENT SYSTOLIC BLOOD PRESSURE < 130 MM HG: ICD-10-PCS | Mod: CPTII,S$GLB,, | Performed by: INTERNAL MEDICINE

## 2021-10-12 PROCEDURE — 99999 PR PBB SHADOW E&M-EST. PATIENT-LVL III: CPT | Mod: PBBFAC,,, | Performed by: INTERNAL MEDICINE

## 2021-10-12 PROCEDURE — 99499 RISK ADDL DX/OHS AUDIT: ICD-10-PCS | Mod: S$GLB,,, | Performed by: INTERNAL MEDICINE

## 2021-10-12 PROCEDURE — 3288F PR FALLS RISK ASSESSMENT DOCUMENTED: ICD-10-PCS | Mod: CPTII,S$GLB,, | Performed by: INTERNAL MEDICINE

## 2021-10-12 PROCEDURE — 82306 VITAMIN D 25 HYDROXY: CPT | Performed by: INTERNAL MEDICINE

## 2021-10-12 PROCEDURE — 1101F PR PT FALLS ASSESS DOC 0-1 FALLS W/OUT INJ PAST YR: ICD-10-PCS | Mod: CPTII,S$GLB,, | Performed by: INTERNAL MEDICINE

## 2021-10-12 PROCEDURE — 1101F PT FALLS ASSESS-DOCD LE1/YR: CPT | Mod: CPTII,S$GLB,, | Performed by: INTERNAL MEDICINE

## 2021-10-12 PROCEDURE — 99213 OFFICE O/P EST LOW 20 MIN: CPT | Mod: S$GLB,,, | Performed by: INTERNAL MEDICINE

## 2021-10-12 PROCEDURE — 3008F PR BODY MASS INDEX (BMI) DOCUMENTED: ICD-10-PCS | Mod: CPTII,S$GLB,, | Performed by: INTERNAL MEDICINE

## 2021-10-12 PROCEDURE — 85025 COMPLETE CBC W/AUTO DIFF WBC: CPT | Performed by: INTERNAL MEDICINE

## 2021-10-12 PROCEDURE — 3079F DIAST BP 80-89 MM HG: CPT | Mod: CPTII,S$GLB,, | Performed by: INTERNAL MEDICINE

## 2021-10-12 PROCEDURE — 1126F PR PAIN SEVERITY QUANTIFIED, NO PAIN PRESENT: ICD-10-PCS | Mod: CPTII,S$GLB,, | Performed by: INTERNAL MEDICINE

## 2021-10-12 PROCEDURE — 36415 COLL VENOUS BLD VENIPUNCTURE: CPT | Mod: PO | Performed by: INTERNAL MEDICINE

## 2021-10-13 LAB
25(OH)D3+25(OH)D2 SERPL-MCNC: 33 NG/ML (ref 30–96)
ALBUMIN SERPL BCP-MCNC: 3.5 G/DL (ref 3.5–5.2)
ALP SERPL-CCNC: 76 U/L (ref 55–135)
ALT SERPL W/O P-5'-P-CCNC: 14 U/L (ref 10–44)
ANION GAP SERPL CALC-SCNC: 13 MMOL/L (ref 8–16)
AST SERPL-CCNC: 22 U/L (ref 10–40)
BILIRUB SERPL-MCNC: 0.5 MG/DL (ref 0.1–1)
BUN SERPL-MCNC: 11 MG/DL (ref 8–23)
CALCIUM SERPL-MCNC: 9.5 MG/DL (ref 8.7–10.5)
CHLORIDE SERPL-SCNC: 101 MMOL/L (ref 95–110)
CHOLEST SERPL-MCNC: 254 MG/DL (ref 120–199)
CHOLEST/HDLC SERPL: 2.9 {RATIO} (ref 2–5)
CO2 SERPL-SCNC: 22 MMOL/L (ref 23–29)
CREAT SERPL-MCNC: 0.9 MG/DL (ref 0.5–1.4)
EST. GFR  (AFRICAN AMERICAN): >60 ML/MIN/1.73 M^2
EST. GFR  (NON AFRICAN AMERICAN): >60 ML/MIN/1.73 M^2
GLUCOSE SERPL-MCNC: 97 MG/DL (ref 70–110)
HDLC SERPL-MCNC: 87 MG/DL (ref 40–75)
HDLC SERPL: 34.3 % (ref 20–50)
LDLC SERPL CALC-MCNC: 131.6 MG/DL (ref 63–159)
NONHDLC SERPL-MCNC: 167 MG/DL
POTASSIUM SERPL-SCNC: 5 MMOL/L (ref 3.5–5.1)
PROT SERPL-MCNC: 7 G/DL (ref 6–8.4)
SODIUM SERPL-SCNC: 136 MMOL/L (ref 136–145)
TRIGL SERPL-MCNC: 177 MG/DL (ref 30–150)
TSH SERPL DL<=0.005 MIU/L-ACNC: 3.48 UIU/ML (ref 0.4–4)

## 2021-10-20 ENCOUNTER — PATIENT OUTREACH (OUTPATIENT)
Dept: ADMINISTRATIVE | Facility: OTHER | Age: 76
End: 2021-10-20

## 2021-10-21 ENCOUNTER — OFFICE VISIT (OUTPATIENT)
Dept: OBSTETRICS AND GYNECOLOGY | Facility: CLINIC | Age: 76
End: 2021-10-21
Payer: MEDICARE

## 2021-10-21 VITALS
WEIGHT: 182.69 LBS | SYSTOLIC BLOOD PRESSURE: 122 MMHG | HEIGHT: 66 IN | DIASTOLIC BLOOD PRESSURE: 78 MMHG | BODY MASS INDEX: 29.36 KG/M2

## 2021-10-21 DIAGNOSIS — N95.1 MENOPAUSAL SYMPTOMS: ICD-10-CM

## 2021-10-21 DIAGNOSIS — Z78.0 POSTMENOPAUSAL: ICD-10-CM

## 2021-10-21 DIAGNOSIS — Z90.710 S/P HYSTERECTOMY: ICD-10-CM

## 2021-10-21 DIAGNOSIS — Z01.419 ENCOUNTER FOR GYNECOLOGICAL EXAMINATION WITHOUT ABNORMAL FINDING: Primary | ICD-10-CM

## 2021-10-21 DIAGNOSIS — Z12.31 BREAST CANCER SCREENING BY MAMMOGRAM: ICD-10-CM

## 2021-10-21 PROCEDURE — 3008F PR BODY MASS INDEX (BMI) DOCUMENTED: ICD-10-PCS | Mod: CPTII,S$GLB,, | Performed by: OBSTETRICS & GYNECOLOGY

## 2021-10-21 PROCEDURE — 1160F RVW MEDS BY RX/DR IN RCRD: CPT | Mod: CPTII,S$GLB,, | Performed by: OBSTETRICS & GYNECOLOGY

## 2021-10-21 PROCEDURE — 1159F PR MEDICATION LIST DOCUMENTED IN MEDICAL RECORD: ICD-10-PCS | Mod: CPTII,S$GLB,, | Performed by: OBSTETRICS & GYNECOLOGY

## 2021-10-21 PROCEDURE — 1126F AMNT PAIN NOTED NONE PRSNT: CPT | Mod: CPTII,S$GLB,, | Performed by: OBSTETRICS & GYNECOLOGY

## 2021-10-21 PROCEDURE — 1160F PR REVIEW ALL MEDS BY PRESCRIBER/CLIN PHARMACIST DOCUMENTED: ICD-10-PCS | Mod: CPTII,S$GLB,, | Performed by: OBSTETRICS & GYNECOLOGY

## 2021-10-21 PROCEDURE — 3078F PR MOST RECENT DIASTOLIC BLOOD PRESSURE < 80 MM HG: ICD-10-PCS | Mod: CPTII,S$GLB,, | Performed by: OBSTETRICS & GYNECOLOGY

## 2021-10-21 PROCEDURE — G0101 PR CA SCREEN;PELVIC/BREAST EXAM: ICD-10-PCS | Mod: S$GLB,,, | Performed by: OBSTETRICS & GYNECOLOGY

## 2021-10-21 PROCEDURE — 3008F BODY MASS INDEX DOCD: CPT | Mod: CPTII,S$GLB,, | Performed by: OBSTETRICS & GYNECOLOGY

## 2021-10-21 PROCEDURE — G0101 CA SCREEN;PELVIC/BREAST EXAM: HCPCS | Mod: S$GLB,,, | Performed by: OBSTETRICS & GYNECOLOGY

## 2021-10-21 PROCEDURE — 3288F FALL RISK ASSESSMENT DOCD: CPT | Mod: CPTII,S$GLB,, | Performed by: OBSTETRICS & GYNECOLOGY

## 2021-10-21 PROCEDURE — 99999 PR PBB SHADOW E&M-EST. PATIENT-LVL III: ICD-10-PCS | Mod: PBBFAC,,, | Performed by: OBSTETRICS & GYNECOLOGY

## 2021-10-21 PROCEDURE — 3078F DIAST BP <80 MM HG: CPT | Mod: CPTII,S$GLB,, | Performed by: OBSTETRICS & GYNECOLOGY

## 2021-10-21 PROCEDURE — 3074F PR MOST RECENT SYSTOLIC BLOOD PRESSURE < 130 MM HG: ICD-10-PCS | Mod: CPTII,S$GLB,, | Performed by: OBSTETRICS & GYNECOLOGY

## 2021-10-21 PROCEDURE — 1101F PT FALLS ASSESS-DOCD LE1/YR: CPT | Mod: CPTII,S$GLB,, | Performed by: OBSTETRICS & GYNECOLOGY

## 2021-10-21 PROCEDURE — 1101F PR PT FALLS ASSESS DOC 0-1 FALLS W/OUT INJ PAST YR: ICD-10-PCS | Mod: CPTII,S$GLB,, | Performed by: OBSTETRICS & GYNECOLOGY

## 2021-10-21 PROCEDURE — 1126F PR PAIN SEVERITY QUANTIFIED, NO PAIN PRESENT: ICD-10-PCS | Mod: CPTII,S$GLB,, | Performed by: OBSTETRICS & GYNECOLOGY

## 2021-10-21 PROCEDURE — 1159F MED LIST DOCD IN RCRD: CPT | Mod: CPTII,S$GLB,, | Performed by: OBSTETRICS & GYNECOLOGY

## 2021-10-21 PROCEDURE — 3074F SYST BP LT 130 MM HG: CPT | Mod: CPTII,S$GLB,, | Performed by: OBSTETRICS & GYNECOLOGY

## 2021-10-21 PROCEDURE — 99999 PR PBB SHADOW E&M-EST. PATIENT-LVL III: CPT | Mod: PBBFAC,,, | Performed by: OBSTETRICS & GYNECOLOGY

## 2021-10-21 PROCEDURE — 3288F PR FALLS RISK ASSESSMENT DOCUMENTED: ICD-10-PCS | Mod: CPTII,S$GLB,, | Performed by: OBSTETRICS & GYNECOLOGY

## 2021-10-21 RX ORDER — ESTRADIOL 2 MG/1
2 TABLET ORAL DAILY
Qty: 90 TABLET | Refills: 4 | Status: SHIPPED | OUTPATIENT
Start: 2021-10-21 | End: 2022-04-12 | Stop reason: SDUPTHER

## 2021-10-27 LAB — HEMOCCULT STL QL IA: NEGATIVE

## 2021-11-26 ENCOUNTER — OFFICE VISIT (OUTPATIENT)
Dept: URGENT CARE | Facility: CLINIC | Age: 76
End: 2021-11-26
Payer: MEDICARE

## 2021-11-26 VITALS
OXYGEN SATURATION: 98 % | SYSTOLIC BLOOD PRESSURE: 141 MMHG | TEMPERATURE: 98 F | RESPIRATION RATE: 18 BRPM | DIASTOLIC BLOOD PRESSURE: 81 MMHG | BODY MASS INDEX: 29.25 KG/M2 | WEIGHT: 182 LBS | HEART RATE: 71 BPM | HEIGHT: 66 IN

## 2021-11-26 DIAGNOSIS — S51.812A SKIN TEAR OF LEFT FOREARM WITHOUT COMPLICATION, INITIAL ENCOUNTER: Primary | ICD-10-CM

## 2021-11-26 PROCEDURE — 90714 TD VACCINE GREATER THAN OR EQUAL TO 7YO WITH PRESERVATIVE IM: ICD-10-PCS | Mod: S$GLB,,, | Performed by: NURSE PRACTITIONER

## 2021-11-26 PROCEDURE — 99203 OFFICE O/P NEW LOW 30 MIN: CPT | Mod: 25,S$GLB,, | Performed by: NURSE PRACTITIONER

## 2021-11-26 PROCEDURE — 99203 PR OFFICE/OUTPT VISIT, NEW, LEVL III, 30-44 MIN: ICD-10-PCS | Mod: 25,S$GLB,, | Performed by: NURSE PRACTITIONER

## 2021-11-26 PROCEDURE — 90471 TD VACCINE GREATER THAN OR EQUAL TO 7YO WITH PRESERVATIVE IM: ICD-10-PCS | Mod: S$GLB,,, | Performed by: NURSE PRACTITIONER

## 2021-11-26 PROCEDURE — 90714 TD VACC NO PRESV 7 YRS+ IM: CPT | Mod: S$GLB,,, | Performed by: NURSE PRACTITIONER

## 2021-11-26 PROCEDURE — 90471 IMMUNIZATION ADMIN: CPT | Mod: S$GLB,,, | Performed by: NURSE PRACTITIONER

## 2021-11-26 RX ORDER — MUPIROCIN 20 MG/G
OINTMENT TOPICAL
Qty: 22 G | Refills: 1 | Status: SHIPPED | OUTPATIENT
Start: 2021-11-26 | End: 2022-04-12

## 2022-02-09 ENCOUNTER — PES CALL (OUTPATIENT)
Dept: ADMINISTRATIVE | Facility: CLINIC | Age: 77
End: 2022-02-09
Payer: MEDICARE

## 2022-04-05 ENCOUNTER — TELEPHONE (OUTPATIENT)
Dept: INTERNAL MEDICINE | Facility: CLINIC | Age: 77
End: 2022-04-05
Payer: MEDICARE

## 2022-04-05 NOTE — TELEPHONE ENCOUNTER
----- Message from Carla Cedeno MD sent at 4/5/2022  1:57 PM CDT -----  Needs pre-op visit. Thanks    ----- Message -----  From: Mireya Adams  Sent: 4/5/2022   1:34 PM CDT  To: Wilian Aguirre Staff    Type:  Needs Medical Advice    Who Called: PT  Symptoms (please be specific): PT has upcoming foot surgery and she needs to have EKG AND BLOOD WORK DONE asap     Would the patient rather a call back or a response via [facility]? call  Best Call Back Number: [phone]  Additional Information:

## 2022-04-06 ENCOUNTER — TELEPHONE (OUTPATIENT)
Dept: INTERNAL MEDICINE | Facility: CLINIC | Age: 77
End: 2022-04-06
Payer: MEDICARE

## 2022-04-06 NOTE — TELEPHONE ENCOUNTER
Spoke to patient and inform that she can bring her paperwork on the date of the appointment for pre-op. Patient voices understanding.

## 2022-04-06 NOTE — TELEPHONE ENCOUNTER
----- Message from Sravani Guajardo sent at 4/6/2022  4:21 PM CDT -----  Type:  Patient Requesting call back    Who Called:Mireille Akins  Would the patient rather a call back or a response via [facility]? Call back  Best Call Back Number:[phone]  Additional Information: Patient Requesting call back regarding completing paperwork for EKG and foot surgery and would like to bring in on day of appointment on 04/12 or can bring in earlier

## 2022-04-12 ENCOUNTER — LAB VISIT (OUTPATIENT)
Dept: LAB | Facility: HOSPITAL | Age: 77
End: 2022-04-12
Attending: INTERNAL MEDICINE
Payer: MEDICARE

## 2022-04-12 ENCOUNTER — OFFICE VISIT (OUTPATIENT)
Dept: INTERNAL MEDICINE | Facility: CLINIC | Age: 77
End: 2022-04-12
Payer: MEDICARE

## 2022-04-12 VITALS
BODY MASS INDEX: 30.58 KG/M2 | HEART RATE: 85 BPM | DIASTOLIC BLOOD PRESSURE: 70 MMHG | WEIGHT: 190.25 LBS | OXYGEN SATURATION: 97 % | HEIGHT: 66 IN | SYSTOLIC BLOOD PRESSURE: 120 MMHG

## 2022-04-12 DIAGNOSIS — Z01.818 PREOPERATIVE CLEARANCE: Primary | ICD-10-CM

## 2022-04-12 DIAGNOSIS — Z01.818 PREOPERATIVE CLEARANCE: ICD-10-CM

## 2022-04-12 LAB
ALBUMIN SERPL BCP-MCNC: 3.4 G/DL (ref 3.5–5.2)
ALP SERPL-CCNC: 75 U/L (ref 55–135)
ALT SERPL W/O P-5'-P-CCNC: 13 U/L (ref 10–44)
ANION GAP SERPL CALC-SCNC: 8 MMOL/L (ref 8–16)
AST SERPL-CCNC: 22 U/L (ref 10–40)
BASOPHILS # BLD AUTO: 0.05 K/UL (ref 0–0.2)
BASOPHILS NFR BLD: 0.9 % (ref 0–1.9)
BILIRUB SERPL-MCNC: 0.3 MG/DL (ref 0.1–1)
BUN SERPL-MCNC: 16 MG/DL (ref 8–23)
CALCIUM SERPL-MCNC: 9.2 MG/DL (ref 8.7–10.5)
CHLORIDE SERPL-SCNC: 101 MMOL/L (ref 95–110)
CO2 SERPL-SCNC: 26 MMOL/L (ref 23–29)
CREAT SERPL-MCNC: 0.8 MG/DL (ref 0.5–1.4)
DIFFERENTIAL METHOD: NORMAL
EOSINOPHIL # BLD AUTO: 0.1 K/UL (ref 0–0.5)
EOSINOPHIL NFR BLD: 2.2 % (ref 0–8)
ERYTHROCYTE [DISTWIDTH] IN BLOOD BY AUTOMATED COUNT: 12.9 % (ref 11.5–14.5)
EST. GFR  (AFRICAN AMERICAN): >60 ML/MIN/1.73 M^2
EST. GFR  (NON AFRICAN AMERICAN): >60 ML/MIN/1.73 M^2
GLUCOSE SERPL-MCNC: 93 MG/DL (ref 70–110)
HCT VFR BLD AUTO: 37.5 % (ref 37–48.5)
HGB BLD-MCNC: 12.6 G/DL (ref 12–16)
IMM GRANULOCYTES # BLD AUTO: 0.01 K/UL (ref 0–0.04)
IMM GRANULOCYTES NFR BLD AUTO: 0.2 % (ref 0–0.5)
LYMPHOCYTES # BLD AUTO: 2 K/UL (ref 1–4.8)
LYMPHOCYTES NFR BLD: 34 % (ref 18–48)
MCH RBC QN AUTO: 30.4 PG (ref 27–31)
MCHC RBC AUTO-ENTMCNC: 33.6 G/DL (ref 32–36)
MCV RBC AUTO: 90 FL (ref 82–98)
MONOCYTES # BLD AUTO: 0.5 K/UL (ref 0.3–1)
MONOCYTES NFR BLD: 8.5 % (ref 4–15)
NEUTROPHILS # BLD AUTO: 3.2 K/UL (ref 1.8–7.7)
NEUTROPHILS NFR BLD: 54.2 % (ref 38–73)
NRBC BLD-RTO: 0 /100 WBC
PLATELET # BLD AUTO: 221 K/UL (ref 150–450)
PMV BLD AUTO: 10.8 FL (ref 9.2–12.9)
POTASSIUM SERPL-SCNC: 4.3 MMOL/L (ref 3.5–5.1)
PROT SERPL-MCNC: 6.9 G/DL (ref 6–8.4)
RBC # BLD AUTO: 4.15 M/UL (ref 4–5.4)
SODIUM SERPL-SCNC: 135 MMOL/L (ref 136–145)
WBC # BLD AUTO: 5.88 K/UL (ref 3.9–12.7)

## 2022-04-12 PROCEDURE — 1126F AMNT PAIN NOTED NONE PRSNT: CPT | Mod: CPTII,S$GLB,, | Performed by: INTERNAL MEDICINE

## 2022-04-12 PROCEDURE — 3078F DIAST BP <80 MM HG: CPT | Mod: CPTII,S$GLB,, | Performed by: INTERNAL MEDICINE

## 2022-04-12 PROCEDURE — 3008F PR BODY MASS INDEX (BMI) DOCUMENTED: ICD-10-PCS | Mod: CPTII,S$GLB,, | Performed by: INTERNAL MEDICINE

## 2022-04-12 PROCEDURE — 1101F PR PT FALLS ASSESS DOC 0-1 FALLS W/OUT INJ PAST YR: ICD-10-PCS | Mod: CPTII,S$GLB,, | Performed by: INTERNAL MEDICINE

## 2022-04-12 PROCEDURE — 80053 COMPREHEN METABOLIC PANEL: CPT | Performed by: INTERNAL MEDICINE

## 2022-04-12 PROCEDURE — 1126F PR PAIN SEVERITY QUANTIFIED, NO PAIN PRESENT: ICD-10-PCS | Mod: CPTII,S$GLB,, | Performed by: INTERNAL MEDICINE

## 2022-04-12 PROCEDURE — 99999 PR PBB SHADOW E&M-EST. PATIENT-LVL III: ICD-10-PCS | Mod: PBBFAC,,, | Performed by: INTERNAL MEDICINE

## 2022-04-12 PROCEDURE — 93005 ELECTROCARDIOGRAM TRACING: CPT | Mod: S$GLB,,, | Performed by: INTERNAL MEDICINE

## 2022-04-12 PROCEDURE — 1160F PR REVIEW ALL MEDS BY PRESCRIBER/CLIN PHARMACIST DOCUMENTED: ICD-10-PCS | Mod: CPTII,S$GLB,, | Performed by: INTERNAL MEDICINE

## 2022-04-12 PROCEDURE — 85025 COMPLETE CBC W/AUTO DIFF WBC: CPT | Performed by: INTERNAL MEDICINE

## 2022-04-12 PROCEDURE — 1160F RVW MEDS BY RX/DR IN RCRD: CPT | Mod: CPTII,S$GLB,, | Performed by: INTERNAL MEDICINE

## 2022-04-12 PROCEDURE — 99999 PR PBB SHADOW E&M-EST. PATIENT-LVL III: CPT | Mod: PBBFAC,,, | Performed by: INTERNAL MEDICINE

## 2022-04-12 PROCEDURE — 3074F PR MOST RECENT SYSTOLIC BLOOD PRESSURE < 130 MM HG: ICD-10-PCS | Mod: CPTII,S$GLB,, | Performed by: INTERNAL MEDICINE

## 2022-04-12 PROCEDURE — 3288F FALL RISK ASSESSMENT DOCD: CPT | Mod: CPTII,S$GLB,, | Performed by: INTERNAL MEDICINE

## 2022-04-12 PROCEDURE — 1101F PT FALLS ASSESS-DOCD LE1/YR: CPT | Mod: CPTII,S$GLB,, | Performed by: INTERNAL MEDICINE

## 2022-04-12 PROCEDURE — 93010 EKG 12-LEAD: ICD-10-PCS | Mod: S$GLB,,, | Performed by: INTERNAL MEDICINE

## 2022-04-12 PROCEDURE — 1159F MED LIST DOCD IN RCRD: CPT | Mod: CPTII,S$GLB,, | Performed by: INTERNAL MEDICINE

## 2022-04-12 PROCEDURE — 3074F SYST BP LT 130 MM HG: CPT | Mod: CPTII,S$GLB,, | Performed by: INTERNAL MEDICINE

## 2022-04-12 PROCEDURE — 3008F BODY MASS INDEX DOCD: CPT | Mod: CPTII,S$GLB,, | Performed by: INTERNAL MEDICINE

## 2022-04-12 PROCEDURE — 3078F PR MOST RECENT DIASTOLIC BLOOD PRESSURE < 80 MM HG: ICD-10-PCS | Mod: CPTII,S$GLB,, | Performed by: INTERNAL MEDICINE

## 2022-04-12 PROCEDURE — 99213 OFFICE O/P EST LOW 20 MIN: CPT | Mod: S$GLB,,, | Performed by: INTERNAL MEDICINE

## 2022-04-12 PROCEDURE — 93010 ELECTROCARDIOGRAM REPORT: CPT | Mod: S$GLB,,, | Performed by: INTERNAL MEDICINE

## 2022-04-12 PROCEDURE — 99213 PR OFFICE/OUTPT VISIT, EST, LEVL III, 20-29 MIN: ICD-10-PCS | Mod: S$GLB,,, | Performed by: INTERNAL MEDICINE

## 2022-04-12 PROCEDURE — 1159F PR MEDICATION LIST DOCUMENTED IN MEDICAL RECORD: ICD-10-PCS | Mod: CPTII,S$GLB,, | Performed by: INTERNAL MEDICINE

## 2022-04-12 PROCEDURE — 93005 EKG 12-LEAD: ICD-10-PCS | Mod: S$GLB,,, | Performed by: INTERNAL MEDICINE

## 2022-04-12 PROCEDURE — 3288F PR FALLS RISK ASSESSMENT DOCUMENTED: ICD-10-PCS | Mod: CPTII,S$GLB,, | Performed by: INTERNAL MEDICINE

## 2022-04-12 PROCEDURE — 36415 COLL VENOUS BLD VENIPUNCTURE: CPT | Mod: PO | Performed by: INTERNAL MEDICINE

## 2022-04-12 NOTE — LETTER
April 12, 2022      [facility]  [address]  [place] [address]  Phone: [phone]  Fax: [phone]       Patient: Mireille Akins   YOB: 1945  Date of Visit: 04/12/2022    To Whom It May Concern:    Bar Akins  was at [facility] on 04/12/2022. She has presented for preoperative evaluation.  Her EKG is normal.  At this time I consider her low risk for any complications during her surgical procedure.  Her labs are still pending at this time and these will be faxed to you when they have resulted.  Please note that we are unable to get a vitamin-D level at this time.  Due to a nation-wide lab equipment shortage,[facility] only allows the ordering of the vitamin D test by a specialist such as an endocrinologist or rheumatologist. Her last vitamin D level on record was 33 and was obtained Oct 2021.   Please contact me if you have any questions or concerns.    Sincerely,        Carla Cedeno MD

## 2022-04-12 NOTE — PROGRESS NOTES
Patient ID: Mireille Akins is a 76 y.o. female.    Chief Complaint: Pre-op Exam    HPI Mireille is a 76 y.o. female with history of asthma and history of abnormal TSH who presents for preop examination prior to undergoing foot surgery next week.  She has no acute complaints or concerns today.  She is feeling well.  She has undergone multiple operations in the past.  She has done well with the anesthesia.  No allergic reactions to anesthesia.  Had only postoperative nausea and vomiting.  No bleeding or blood clotting issues intraoperatively or postoperatively.  Patient has been playing tennis regularly for years and does not experience any shortness of breath or chest pain while playing this sport.    Review of Systems   All other systems reviewed and are negative.     Objective:     Vitals:    04/12/22 1507   BP: 120/70   Pulse: 85        Physical Exam  Vitals reviewed.   Constitutional:       General: She is not in acute distress.     Appearance: Normal appearance. She is well-developed. She is not ill-appearing, toxic-appearing or diaphoretic.   HENT:      Head: Normocephalic and atraumatic.      Right Ear: External ear normal.      Left Ear: External ear normal.      Nose: Nose normal.   Eyes:      General: No scleral icterus.        Right eye: No discharge.         Left eye: No discharge.      Extraocular Movements: Extraocular movements intact.      Conjunctiva/sclera: Conjunctivae normal.   Cardiovascular:      Rate and Rhythm: Normal rate and regular rhythm.      Heart sounds: Normal heart sounds. No murmur heard.    No friction rub. No gallop.   Pulmonary:      Effort: Pulmonary effort is normal. No respiratory distress.      Breath sounds: Normal breath sounds. No stridor. No wheezing, rhonchi or rales.   Skin:     General: Skin is warm and dry.   Neurological:      General: No focal deficit present.      Mental Status: She is alert and oriented to person, place, and time. Mental status is at  baseline.   Psychiatric:         Mood and Affect: Mood normal.         Behavior: Behavior normal.         Thought Content: Thought content normal.         Judgment: Judgment normal.         Assessment:       1. Preoperative clearance        Plan:     EKG normal. Labs WNL. Patient is low risk for any adverse events occurring during this low risk surgery.     Preoperative clearance  -     EKG 12-lead; Future  -     CBC Auto Differential; Future; Expected date: 04/12/2022  -     Comprehensive Metabolic Panel; Future; Expected date: 04/12/2022      RTC PRN      Warning signs discussed, patient to call with any further issues or worsening of symptoms.       Parts of the above note were dictated using a voice dictation software. Please excuse any grammatical or typographical errors.

## 2022-04-12 NOTE — PROGRESS NOTES
Patient ID: Mireille Akins is a 76 y.o. female.    Chief Complaint: No chief complaint on file.    LYNN Kendrick is a 76 y.o. female who presents today with     Review of Systems          Objective:   There were no vitals filed for this visit.     Physical Exam    Assessment:       No diagnosis found.    Plan:         There are no diagnoses linked to this encounter.        Warning signs discussed, patient to call with any further issues or worsening of symptoms.       Parts of the above note were dictated using a voice dictation software. Please excuse any grammatical or typographical errors.

## 2022-04-14 ENCOUNTER — TELEPHONE (OUTPATIENT)
Dept: INTERNAL MEDICINE | Facility: CLINIC | Age: 77
End: 2022-04-14
Payer: MEDICARE

## 2022-04-19 ENCOUNTER — PATIENT OUTREACH (OUTPATIENT)
Dept: ADMINISTRATIVE | Facility: HOSPITAL | Age: 77
End: 2022-04-19
Payer: MEDICARE

## 2022-04-28 RX ORDER — PANTOPRAZOLE SODIUM 40 MG/1
TABLET, DELAYED RELEASE ORAL
Qty: 90 TABLET | Refills: 3 | Status: SHIPPED | OUTPATIENT
Start: 2022-04-28 | End: 2023-04-21

## 2022-04-28 NOTE — TELEPHONE ENCOUNTER
Refill Routing Note   Medication(s) are not appropriate for processing by [facility] for the following reason(s):      - Medication not previously prescribed by PCP  - Required indication for medication not on problem list (GERD)    [facility] action(s):  Defer       Medication Therapy Plan: pantoprazole last ordered by Dr. Marin  Medication reconciliation completed: No     Appointments  past 12m or future 3m with PCP    Date Provider   Last Visit   4/12/2022 Carla Cedeno MD   Next Visit   10/13/2022 Carla Cedeno MD   ED visits in past 90 days: 0        Note composed:1:38 PM 04/28/2022

## 2022-04-28 NOTE — TELEPHONE ENCOUNTER
No new care gaps identified.  Powered by [employer]. Reference number: [id number].   4/28/2022 1:28:17 PM CDT

## 2022-05-04 ENCOUNTER — PES CALL (OUTPATIENT)
Dept: ADMINISTRATIVE | Facility: CLINIC | Age: 77
End: 2022-05-04
Payer: MEDICARE

## 2022-05-31 ENCOUNTER — PES CALL (OUTPATIENT)
Dept: ADMINISTRATIVE | Facility: CLINIC | Age: 77
End: 2022-05-31
Payer: MEDICARE

## 2022-08-05 ENCOUNTER — PATIENT MESSAGE (OUTPATIENT)
Dept: INTERNAL MEDICINE | Facility: CLINIC | Age: 77
End: 2022-08-05
Payer: MEDICARE

## 2022-08-08 ENCOUNTER — TELEPHONE (OUTPATIENT)
Dept: INTERNAL MEDICINE | Facility: CLINIC | Age: 77
End: 2022-08-08
Payer: MEDICARE

## 2022-08-08 NOTE — TELEPHONE ENCOUNTER
----- Message from Hannah Salter sent at 8/8/2022  4:08 PM CDT -----  Contact: [phone] Patient    Caller is requesting to schedule their Lab appointment prior to annual appointment.  Order is not listed in EPIC.  Please enter order and contact patient to schedule.      Preferred Date and Time of Labs:10/11/2022 8:30 am    Date of Annual Physical Appointment:10/13/2022    Where would they like the lab performed? [facility]    Would the patient rather a call back or a response via [facility]? Pt portal

## 2022-08-25 DIAGNOSIS — E03.8 SUBCLINICAL HYPOTHYROIDISM: ICD-10-CM

## 2022-08-25 DIAGNOSIS — Z00.00 ANNUAL PHYSICAL EXAM: Primary | ICD-10-CM

## 2022-08-25 DIAGNOSIS — Z13.6 ENCOUNTER FOR LIPID SCREENING FOR CARDIOVASCULAR DISEASE: ICD-10-CM

## 2022-08-25 DIAGNOSIS — Z13.220 ENCOUNTER FOR LIPID SCREENING FOR CARDIOVASCULAR DISEASE: ICD-10-CM

## 2022-08-25 DIAGNOSIS — R79.89 ABNORMAL CBC: ICD-10-CM

## 2022-08-30 PROBLEM — Z79.890 HORMONE REPLACEMENT THERAPY (HRT): Status: ACTIVE | Noted: 2022-08-30

## 2022-09-02 ENCOUNTER — TELEPHONE (OUTPATIENT)
Dept: ADMINISTRATIVE | Facility: CLINIC | Age: 77
End: 2022-09-02
Payer: MEDICARE

## 2022-09-06 ENCOUNTER — OFFICE VISIT (OUTPATIENT)
Dept: INTERNAL MEDICINE | Facility: CLINIC | Age: 77
End: 2022-09-06
Payer: MEDICARE

## 2022-09-06 VITALS
BODY MASS INDEX: 28.08 KG/M2 | DIASTOLIC BLOOD PRESSURE: 75 MMHG | RESPIRATION RATE: 15 BRPM | HEIGHT: 67 IN | SYSTOLIC BLOOD PRESSURE: 125 MMHG | HEART RATE: 79 BPM | WEIGHT: 178.88 LBS | OXYGEN SATURATION: 98 %

## 2022-09-06 DIAGNOSIS — D17.9 ANGIOMYOLIPOMA: ICD-10-CM

## 2022-09-06 DIAGNOSIS — E66.3 OVERWEIGHT (BMI 25.0-29.9): ICD-10-CM

## 2022-09-06 DIAGNOSIS — N28.89 RENAL MASS, RIGHT: ICD-10-CM

## 2022-09-06 DIAGNOSIS — Z79.890 HORMONE REPLACEMENT THERAPY (HRT): ICD-10-CM

## 2022-09-06 DIAGNOSIS — Z00.00 ENCOUNTER FOR PREVENTIVE HEALTH EXAMINATION: Primary | ICD-10-CM

## 2022-09-06 DIAGNOSIS — Z63.79 STRESS DUE TO ILLNESS OF FAMILY MEMBER: ICD-10-CM

## 2022-09-06 PROCEDURE — 99999 PR PBB SHADOW E&M-EST. PATIENT-LVL IV: CPT | Mod: PBBFAC,,, | Performed by: NURSE PRACTITIONER

## 2022-09-06 PROCEDURE — 3078F DIAST BP <80 MM HG: CPT | Mod: CPTII,S$GLB,, | Performed by: NURSE PRACTITIONER

## 2022-09-06 PROCEDURE — 1101F PT FALLS ASSESS-DOCD LE1/YR: CPT | Mod: CPTII,S$GLB,, | Performed by: NURSE PRACTITIONER

## 2022-09-06 PROCEDURE — G0439 PR MEDICARE ANNUAL WELLNESS SUBSEQUENT VISIT: ICD-10-PCS | Mod: S$GLB,,, | Performed by: NURSE PRACTITIONER

## 2022-09-06 PROCEDURE — 3074F SYST BP LT 130 MM HG: CPT | Mod: CPTII,S$GLB,, | Performed by: NURSE PRACTITIONER

## 2022-09-06 PROCEDURE — 1126F PR PAIN SEVERITY QUANTIFIED, NO PAIN PRESENT: ICD-10-PCS | Mod: CPTII,S$GLB,, | Performed by: NURSE PRACTITIONER

## 2022-09-06 PROCEDURE — 1170F PR FUNCTIONAL STATUS ASSESSED: ICD-10-PCS | Mod: CPTII,S$GLB,, | Performed by: NURSE PRACTITIONER

## 2022-09-06 PROCEDURE — 1160F RVW MEDS BY RX/DR IN RCRD: CPT | Mod: CPTII,S$GLB,, | Performed by: NURSE PRACTITIONER

## 2022-09-06 PROCEDURE — 1126F AMNT PAIN NOTED NONE PRSNT: CPT | Mod: CPTII,S$GLB,, | Performed by: NURSE PRACTITIONER

## 2022-09-06 PROCEDURE — 3074F PR MOST RECENT SYSTOLIC BLOOD PRESSURE < 130 MM HG: ICD-10-PCS | Mod: CPTII,S$GLB,, | Performed by: NURSE PRACTITIONER

## 2022-09-06 PROCEDURE — 1160F PR REVIEW ALL MEDS BY PRESCRIBER/CLIN PHARMACIST DOCUMENTED: ICD-10-PCS | Mod: CPTII,S$GLB,, | Performed by: NURSE PRACTITIONER

## 2022-09-06 PROCEDURE — 3078F PR MOST RECENT DIASTOLIC BLOOD PRESSURE < 80 MM HG: ICD-10-PCS | Mod: CPTII,S$GLB,, | Performed by: NURSE PRACTITIONER

## 2022-09-06 PROCEDURE — 3288F PR FALLS RISK ASSESSMENT DOCUMENTED: ICD-10-PCS | Mod: CPTII,S$GLB,, | Performed by: NURSE PRACTITIONER

## 2022-09-06 PROCEDURE — 99999 PR PBB SHADOW E&M-EST. PATIENT-LVL IV: ICD-10-PCS | Mod: PBBFAC,,, | Performed by: NURSE PRACTITIONER

## 2022-09-06 PROCEDURE — 1101F PR PT FALLS ASSESS DOC 0-1 FALLS W/OUT INJ PAST YR: ICD-10-PCS | Mod: CPTII,S$GLB,, | Performed by: NURSE PRACTITIONER

## 2022-09-06 PROCEDURE — G0439 PPPS, SUBSEQ VISIT: HCPCS | Mod: S$GLB,,, | Performed by: NURSE PRACTITIONER

## 2022-09-06 PROCEDURE — 1159F MED LIST DOCD IN RCRD: CPT | Mod: CPTII,S$GLB,, | Performed by: NURSE PRACTITIONER

## 2022-09-06 PROCEDURE — 3288F FALL RISK ASSESSMENT DOCD: CPT | Mod: CPTII,S$GLB,, | Performed by: NURSE PRACTITIONER

## 2022-09-06 PROCEDURE — 1159F PR MEDICATION LIST DOCUMENTED IN MEDICAL RECORD: ICD-10-PCS | Mod: CPTII,S$GLB,, | Performed by: NURSE PRACTITIONER

## 2022-09-06 PROCEDURE — 1170F FXNL STATUS ASSESSED: CPT | Mod: CPTII,S$GLB,, | Performed by: NURSE PRACTITIONER

## 2022-09-06 NOTE — PATIENT INSTRUCTIONS
Counseling and Referral of Other Preventative  (Italic type indicates deductible and co-insurance are waived)    Patient Name: Mireille Akins  Today's Date: 9/6/2022    Health Maintenance         Date Due Completion Date    Shingles Vaccine (1 of 2) Declined   ---    Pneumococcal Vaccines (Age 65+) (2 - PCV) due 8/6/2019    Influenza Vaccine (1) 09/01/2022 11/29/2021    Colorectal Cancer Screening 10/27/2022   10/27/2021    DEXA Scan 11/11/2023 11/11/2021    Lipid Panel 10/12/2022   10/12/2021    TETANUS VACCINE 11/26/2031 11/26/2021          No orders of the defined types were placed in this encounter.    The following information is provided to all patients.  This information is to help you find resources for any of the problems found today that may be affecting your health:                Living healthy guide: www.[facility].[place].[facility]      Understanding Diabetes: www.diabetes.org      Eating healthy: www.cdc.gov/healthyweight      [facility] home safety checklist: www.cdc.gov/steadi/patient.html      Agency on Aging: www.goea.[place].[facility]      Alcoholics anonymous (AA): www.aa.org      Physical Activity: www.[place].nih.gov/fa1ekqd      Tobacco use: www.quitwithusla.org

## 2022-09-06 NOTE — PROGRESS NOTES
"Mireille Akins presented for a  Medicare AWV and comprehensive Health Risk Assessment today. The following components were reviewed and updated:    Medical history  Family History  Social history  Allergies and Current Medications  Health Risk Assessment  Health Maintenance  Care Team     ** See Completed Assessments for Annual Wellness Visit within the encounter summary.**       The following assessments were completed:  Living Situation  CAGE  Depression Screening  Timed Get Up and Go  Whisper Test  Cognitive Function Screening  Nutrition Screening  ADL Screening  PAQ Screening    Vitals:    09/06/22 1203   BP: 125/75   BP Location: Left arm   Patient Position: Sitting   Pulse: 79   Resp: 15   SpO2: 98%   Weight: 81.1 kg (178 lb 14.4 oz)   Height: 5' 6.5" (1.689 m)     Body mass index is 28.44 kg/m².  Physical Exam  Constitutional:       Appearance: She is well-developed.   HENT:      Head: Normocephalic.      Comments: Wears face mask  Cardiovascular:      Rate and Rhythm: Normal rate and regular rhythm.   Pulmonary:      Effort: Pulmonary effort is normal.      Breath sounds: Normal breath sounds.   Abdominal:      Palpations: Abdomen is soft.   Musculoskeletal:         General: Normal range of motion.   Skin:     General: Skin is warm and dry.   Neurological:      Mental Status: She is alert and oriented to person, place, and time.      Motor: No abnormal muscle tone.      Deep Tendon Reflexes: Reflexes are normal and symmetric.   Psychiatric:         Mood and Affect: Mood normal.         Behavior: Behavior normal.         Thought Content: Thought content normal.         Judgment: Judgment normal.         Lab Results   Component Value Date    CHOL 254 (H) 10/12/2021    HDL 87 (H) 10/12/2021    LDLCALC 131.6 10/12/2021    TRIG 177 (H) 10/12/2021    CHOLHDL 34.3 10/12/2021      No results found for this or any previous visit.    No results found for this or any previous visit.                Diagnoses and " health risks identified today and associated recommendations/orders:    1. Renal mass, right  Stable-Dx in 2014- referred to PCP for followup    2. Hormone replacement therapy (HRT)  Stable- followed by GYN    3. Encounter for preventive health examination  Here for Health Risk Assessment/Annual Wellness Visit.  Health maintenance reviewed and updated. Follow up in one year.        4. Angiomyolipoma  Stable-Dx in 2014- referred to PCP for followup      5. Overweight (BMI 25.0-29.9)  Chronic. Followed by PCP.   Centers for Disease Control and Prevention (CDC)  weight recommendations for current BMI & ideal BMI range discussed with patient.  Recommended  gradual weight loss,  mediterranean diet , structured regular exercise every day.       6. Stress due to illness of family member-  - newly dx as diabetic-   Stable- followed by PCP      Provided Mireille with a 5-10 year written screening schedule and personal prevention plan. Recommendations were developed using the USPSTF age appropriate recommendations. Education, counseling, and referrals were provided as needed. After Visit Summary printed and given to patient which includes a list of additional screenings\tests needed. Problem list reviewed & updated with patient.-states did not recall renal mass as listed on active problem list- epic chart review documentation in 2014- pt provided with copies-deferred to PCP. Pt states her DXA & Mammogram  were done at [facility] within past year- OK. Verbalizes difficulty sleeping due to  new dx of diabetes- I recommended her to gain knowledge of DM, attend educational DM meetings, networking with family & friends , spiritual practices & prayer,  speaking with 's provider & consider counselor in primary care.   Follow up in about 1 year (around 9/6/2023).    Late entry: An addendum has been made to the medical record to reflect the following services provided at the time of the AWV:.      Opioid screening has  been done- patient denies taking opioid medication.  Review for substance use disorder screen-  patient denies using substance medication.       Vanessa Yen, HILDA   I offered to discuss advanced care planning, including how to pick a person who would make decisions for you if you were unable to make them for yourself, called a health care power of , and what kind of decisions you might make such as use of life sustaining treatments such as ventilators and tube feeding when faced with a life limiting illness recorded on a living will that they will need to know. (How you want to be cared for as you near the end of your natural life)     X Patient is interested in learning more about how to make advanced directives.  I provided them paperwork and offered to discuss this with them.

## 2022-09-08 ENCOUNTER — TELEPHONE (OUTPATIENT)
Dept: INTERNAL MEDICINE | Facility: CLINIC | Age: 77
End: 2022-09-08
Payer: MEDICARE

## 2022-09-08 NOTE — TELEPHONE ENCOUNTER
----- Message from Jose Roa sent at 9/8/2022 12:09 PM CDT -----  Contact: pt   .Type:  Same Day Appointment Request    Caller is requesting a same day appointment.  Caller declined first available appointment listed below.    Name of Caller:pt  When is the first available appointment?  Symptoms:stomach pain  Best Call Back Number:[phone]  Additional Information: Pt. Is requesting to be seen soon as possible

## 2022-10-03 ENCOUNTER — PATIENT MESSAGE (OUTPATIENT)
Dept: ADMINISTRATIVE | Facility: HOSPITAL | Age: 77
End: 2022-10-03
Payer: MEDICARE

## 2022-10-10 ENCOUNTER — LAB VISIT (OUTPATIENT)
Dept: LAB | Facility: HOSPITAL | Age: 77
End: 2022-10-10
Attending: INTERNAL MEDICINE
Payer: MEDICARE

## 2022-10-10 DIAGNOSIS — R79.89 ABNORMAL CBC: ICD-10-CM

## 2022-10-10 DIAGNOSIS — E03.8 SUBCLINICAL HYPOTHYROIDISM: ICD-10-CM

## 2022-10-10 DIAGNOSIS — Z00.00 ANNUAL PHYSICAL EXAM: ICD-10-CM

## 2022-10-10 DIAGNOSIS — Z13.220 ENCOUNTER FOR LIPID SCREENING FOR CARDIOVASCULAR DISEASE: ICD-10-CM

## 2022-10-10 DIAGNOSIS — Z13.6 ENCOUNTER FOR LIPID SCREENING FOR CARDIOVASCULAR DISEASE: ICD-10-CM

## 2022-10-10 LAB
ALBUMIN SERPL BCP-MCNC: 3.7 G/DL (ref 3.5–5.2)
ALP SERPL-CCNC: 75 U/L (ref 55–135)
ALT SERPL W/O P-5'-P-CCNC: 16 U/L (ref 10–44)
ANION GAP SERPL CALC-SCNC: 10 MMOL/L (ref 8–16)
AST SERPL-CCNC: 21 U/L (ref 10–40)
BASOPHILS # BLD AUTO: 0.06 K/UL (ref 0–0.2)
BASOPHILS NFR BLD: 1.2 % (ref 0–1.9)
BILIRUB SERPL-MCNC: 0.5 MG/DL (ref 0.1–1)
BUN SERPL-MCNC: 12 MG/DL (ref 8–23)
CALCIUM SERPL-MCNC: 9.7 MG/DL (ref 8.7–10.5)
CHLORIDE SERPL-SCNC: 102 MMOL/L (ref 95–110)
CHOLEST SERPL-MCNC: 247 MG/DL (ref 120–199)
CHOLEST/HDLC SERPL: 3.3 {RATIO} (ref 2–5)
CO2 SERPL-SCNC: 25 MMOL/L (ref 23–29)
CREAT SERPL-MCNC: 0.8 MG/DL (ref 0.5–1.4)
DIFFERENTIAL METHOD: ABNORMAL
EOSINOPHIL # BLD AUTO: 0.2 K/UL (ref 0–0.5)
EOSINOPHIL NFR BLD: 3.2 % (ref 0–8)
ERYTHROCYTE [DISTWIDTH] IN BLOOD BY AUTOMATED COUNT: 13.3 % (ref 11.5–14.5)
EST. GFR  (NO RACE VARIABLE): >60 ML/MIN/1.73 M^2
GLUCOSE SERPL-MCNC: 107 MG/DL (ref 70–110)
HCT VFR BLD AUTO: 41 % (ref 37–48.5)
HDLC SERPL-MCNC: 76 MG/DL (ref 40–75)
HDLC SERPL: 30.8 % (ref 20–50)
HGB BLD-MCNC: 13.3 G/DL (ref 12–16)
IMM GRANULOCYTES # BLD AUTO: 0.01 K/UL (ref 0–0.04)
IMM GRANULOCYTES NFR BLD AUTO: 0.2 % (ref 0–0.5)
LDLC SERPL CALC-MCNC: 145.4 MG/DL (ref 63–159)
LYMPHOCYTES # BLD AUTO: 2 K/UL (ref 1–4.8)
LYMPHOCYTES NFR BLD: 40.4 % (ref 18–48)
MCH RBC QN AUTO: 31.1 PG (ref 27–31)
MCHC RBC AUTO-ENTMCNC: 32.4 G/DL (ref 32–36)
MCV RBC AUTO: 96 FL (ref 82–98)
MONOCYTES # BLD AUTO: 0.4 K/UL (ref 0.3–1)
MONOCYTES NFR BLD: 8 % (ref 4–15)
NEUTROPHILS # BLD AUTO: 2.4 K/UL (ref 1.8–7.7)
NEUTROPHILS NFR BLD: 47 % (ref 38–73)
NONHDLC SERPL-MCNC: 171 MG/DL
NRBC BLD-RTO: 0 /100 WBC
PLATELET # BLD AUTO: 255 K/UL (ref 150–450)
PMV BLD AUTO: 11.1 FL (ref 9.2–12.9)
POTASSIUM SERPL-SCNC: 4.9 MMOL/L (ref 3.5–5.1)
PROT SERPL-MCNC: 6.5 G/DL (ref 6–8.4)
RBC # BLD AUTO: 4.27 M/UL (ref 4–5.4)
SODIUM SERPL-SCNC: 137 MMOL/L (ref 136–145)
T4 FREE SERPL-MCNC: 0.77 NG/DL (ref 0.71–1.51)
TRIGL SERPL-MCNC: 128 MG/DL (ref 30–150)
TSH SERPL DL<=0.005 MIU/L-ACNC: 4.87 UIU/ML (ref 0.4–4)
WBC # BLD AUTO: 5 K/UL (ref 3.9–12.7)

## 2022-10-10 PROCEDURE — 80053 COMPREHEN METABOLIC PANEL: CPT | Performed by: INTERNAL MEDICINE

## 2022-10-10 PROCEDURE — 85025 COMPLETE CBC W/AUTO DIFF WBC: CPT | Performed by: INTERNAL MEDICINE

## 2022-10-10 PROCEDURE — 84443 ASSAY THYROID STIM HORMONE: CPT | Performed by: INTERNAL MEDICINE

## 2022-10-10 PROCEDURE — 36415 COLL VENOUS BLD VENIPUNCTURE: CPT | Mod: PO | Performed by: INTERNAL MEDICINE

## 2022-10-10 PROCEDURE — 84439 ASSAY OF FREE THYROXINE: CPT | Performed by: INTERNAL MEDICINE

## 2022-10-10 PROCEDURE — 80061 LIPID PANEL: CPT | Performed by: INTERNAL MEDICINE

## 2022-10-13 ENCOUNTER — OFFICE VISIT (OUTPATIENT)
Dept: INTERNAL MEDICINE | Facility: CLINIC | Age: 77
End: 2022-10-13
Payer: MEDICARE

## 2022-10-13 VITALS
BODY MASS INDEX: 27.86 KG/M2 | WEIGHT: 177.5 LBS | DIASTOLIC BLOOD PRESSURE: 70 MMHG | SYSTOLIC BLOOD PRESSURE: 106 MMHG | HEART RATE: 69 BPM | HEIGHT: 67 IN | OXYGEN SATURATION: 98 %

## 2022-10-13 DIAGNOSIS — E03.8 SUBCLINICAL HYPOTHYROIDISM: ICD-10-CM

## 2022-10-13 DIAGNOSIS — Z23 NEED FOR INFLUENZA VACCINATION: ICD-10-CM

## 2022-10-13 DIAGNOSIS — D17.9 ANGIOMYOLIPOMA: Primary | ICD-10-CM

## 2022-10-13 DIAGNOSIS — E78.5 HYPERLIPIDEMIA, UNSPECIFIED HYPERLIPIDEMIA TYPE: ICD-10-CM

## 2022-10-13 PROCEDURE — 3288F FALL RISK ASSESSMENT DOCD: CPT | Mod: CPTII,S$GLB,, | Performed by: INTERNAL MEDICINE

## 2022-10-13 PROCEDURE — 1101F PR PT FALLS ASSESS DOC 0-1 FALLS W/OUT INJ PAST YR: ICD-10-PCS | Mod: CPTII,S$GLB,, | Performed by: INTERNAL MEDICINE

## 2022-10-13 PROCEDURE — 1159F PR MEDICATION LIST DOCUMENTED IN MEDICAL RECORD: ICD-10-PCS | Mod: CPTII,S$GLB,, | Performed by: INTERNAL MEDICINE

## 2022-10-13 PROCEDURE — 1160F PR REVIEW ALL MEDS BY PRESCRIBER/CLIN PHARMACIST DOCUMENTED: ICD-10-PCS | Mod: CPTII,S$GLB,, | Performed by: INTERNAL MEDICINE

## 2022-10-13 PROCEDURE — 3288F PR FALLS RISK ASSESSMENT DOCUMENTED: ICD-10-PCS | Mod: CPTII,S$GLB,, | Performed by: INTERNAL MEDICINE

## 2022-10-13 PROCEDURE — G0008 FLU VACCINE - QUADRIVALENT - ADJUVANTED: ICD-10-PCS | Mod: S$GLB,,, | Performed by: INTERNAL MEDICINE

## 2022-10-13 PROCEDURE — 3078F DIAST BP <80 MM HG: CPT | Mod: CPTII,S$GLB,, | Performed by: INTERNAL MEDICINE

## 2022-10-13 PROCEDURE — 99999 PR PBB SHADOW E&M-EST. PATIENT-LVL IV: ICD-10-PCS | Mod: PBBFAC,,, | Performed by: INTERNAL MEDICINE

## 2022-10-13 PROCEDURE — 1126F PR PAIN SEVERITY QUANTIFIED, NO PAIN PRESENT: ICD-10-PCS | Mod: CPTII,S$GLB,, | Performed by: INTERNAL MEDICINE

## 2022-10-13 PROCEDURE — 1159F MED LIST DOCD IN RCRD: CPT | Mod: CPTII,S$GLB,, | Performed by: INTERNAL MEDICINE

## 2022-10-13 PROCEDURE — G0008 ADMIN INFLUENZA VIRUS VAC: HCPCS | Mod: S$GLB,,, | Performed by: INTERNAL MEDICINE

## 2022-10-13 PROCEDURE — 90694 FLU VACCINE - QUADRIVALENT - ADJUVANTED: ICD-10-PCS | Mod: S$GLB,,, | Performed by: INTERNAL MEDICINE

## 2022-10-13 PROCEDURE — 99214 OFFICE O/P EST MOD 30 MIN: CPT | Mod: 25,S$GLB,, | Performed by: INTERNAL MEDICINE

## 2022-10-13 PROCEDURE — 90694 VACC AIIV4 NO PRSRV 0.5ML IM: CPT | Mod: S$GLB,,, | Performed by: INTERNAL MEDICINE

## 2022-10-13 PROCEDURE — 1101F PT FALLS ASSESS-DOCD LE1/YR: CPT | Mod: CPTII,S$GLB,, | Performed by: INTERNAL MEDICINE

## 2022-10-13 PROCEDURE — 1126F AMNT PAIN NOTED NONE PRSNT: CPT | Mod: CPTII,S$GLB,, | Performed by: INTERNAL MEDICINE

## 2022-10-13 PROCEDURE — 1160F RVW MEDS BY RX/DR IN RCRD: CPT | Mod: CPTII,S$GLB,, | Performed by: INTERNAL MEDICINE

## 2022-10-13 PROCEDURE — 99999 PR PBB SHADOW E&M-EST. PATIENT-LVL IV: CPT | Mod: PBBFAC,,, | Performed by: INTERNAL MEDICINE

## 2022-10-13 PROCEDURE — 99214 PR OFFICE/OUTPT VISIT, EST, LEVL IV, 30-39 MIN: ICD-10-PCS | Mod: 25,S$GLB,, | Performed by: INTERNAL MEDICINE

## 2022-10-13 PROCEDURE — 3074F PR MOST RECENT SYSTOLIC BLOOD PRESSURE < 130 MM HG: ICD-10-PCS | Mod: CPTII,S$GLB,, | Performed by: INTERNAL MEDICINE

## 2022-10-13 PROCEDURE — 3078F PR MOST RECENT DIASTOLIC BLOOD PRESSURE < 80 MM HG: ICD-10-PCS | Mod: CPTII,S$GLB,, | Performed by: INTERNAL MEDICINE

## 2022-10-13 PROCEDURE — 3074F SYST BP LT 130 MM HG: CPT | Mod: CPTII,S$GLB,, | Performed by: INTERNAL MEDICINE

## 2022-10-13 RX ORDER — MELOXICAM 15 MG/1
TABLET ORAL
COMMUNITY
Start: 2022-09-20 | End: 2023-04-26

## 2022-10-13 NOTE — PROGRESS NOTES
Patient ID: Mireille Akins is a 77 y.o. female.    Chief Complaint: Annual Exam    HPI Mireille is a 77 y.o. female with  subclinical hypothyroidism and history of kidney stone who presents for annual exam. She is concerned regarding a finding on an old CT scan. She states that she was doing a [facility] screening exam and they were going through her old records. They asked her about the renal mass and she did not know what they were talking about. Today, we have reviewed her CT scan from 2014 which shows renal angiomyolipoma.   No further acute complaints or concerns.   Reviewed lab results from 10/10/22 with her today.    Health Maintenance Topics with due status: Not Due       Topic Last Completion Date    Colorectal Cancer Screening 10/27/2021    DEXA Scan 11/11/2021    TETANUS VACCINE 11/26/2021    Lipid Panel 10/10/2022      Review of Systems   All other systems reviewed and are negative.    Objective:     Vitals:    10/13/22 1051   BP: 106/70   Pulse: 69        Physical Exam  Vitals reviewed.   Constitutional:       General: She is not in acute distress.     Appearance: Normal appearance. She is well-developed. She is not ill-appearing, toxic-appearing or diaphoretic.   HENT:      Head: Normocephalic and atraumatic.      Right Ear: Tympanic membrane, ear canal and external ear normal. There is no impacted cerumen.      Left Ear: Tympanic membrane, ear canal and external ear normal. There is no impacted cerumen.      Nose: Nose normal.   Eyes:      General: No scleral icterus.        Right eye: No discharge.         Left eye: No discharge.      Extraocular Movements: Extraocular movements intact.      Conjunctiva/sclera: Conjunctivae normal.   Neck:      Thyroid: No thyromegaly.      Trachea: No tracheal deviation.   Cardiovascular:      Rate and Rhythm: Normal rate and regular rhythm.      Pulses: Normal pulses.      Heart sounds: Normal heart sounds. No murmur heard.    No friction rub. No gallop.    Pulmonary:      Effort: Pulmonary effort is normal. No respiratory distress.      Breath sounds: Normal breath sounds. No stridor. No wheezing, rhonchi or rales.   Chest:      Chest wall: No tenderness.   Abdominal:      General: Bowel sounds are normal. There is no distension.      Palpations: Abdomen is soft. There is no mass.      Tenderness: There is no abdominal tenderness. There is no guarding or rebound.      Hernia: No hernia is present.   Musculoskeletal:         General: No tenderness or deformity.      Cervical back: Neck supple.      Right lower leg: No edema.      Left lower leg: No edema.   Lymphadenopathy:      Cervical: No cervical adenopathy.   Skin:     General: Skin is warm and dry.      Findings: No erythema or rash.   Neurological:      General: No focal deficit present.      Mental Status: She is alert and oriented to person, place, and time. Mental status is at baseline.   Psychiatric:         Mood and Affect: Mood normal.         Behavior: Behavior normal.         Thought Content: Thought content normal.         Judgment: Judgment normal.       Assessment:       1. Angiomyolipoma Chronic   2. Need for influenza vaccination    3. Hyperlipidemia, unspecified hyperlipidemia type Active   4. Subclinical hypothyroidism Chronic         Plan:     I reviewed the chart and see that urology did discuss these findings with the patient on 11/10/2014 and I called her and informed her of this today. She was very receptive to this information.     Angiomyolipoma  -     Ambulatory referral/consult to Urology; Future; Expected date: 10/20/2022  -     Ambulatory referral/consult to Urology; Future; Expected date: 10/20/2022    Need for influenza vaccination  -     Influenza (FLUAD) - Quadrivalent (Adjuvanted) *Preferred* (65+) (PF)    Hyperlipidemia, unspecified hyperlipidemia type  Comments:  Cont exercise. Recommend low cholesterol diet. Cont to monitor   Orders:  -     Lipid Panel; Future; Expected date:  04/13/2023    Subclinical hypothyroidism  Comments:  Cont to monitor   Orders:  -     TSH; Future; Expected date: 04/13/2023      RTC 6 months     Warning signs discussed, patient to call with any further issues or worsening of symptoms.       Parts of the above note were dictated using a voice dictation software. Please excuse any grammatical or typographical errors.

## 2022-10-14 ENCOUNTER — TELEPHONE (OUTPATIENT)
Dept: ADMINISTRATIVE | Facility: OTHER | Age: 77
End: 2022-10-14
Payer: MEDICARE

## 2022-11-02 ENCOUNTER — PATIENT MESSAGE (OUTPATIENT)
Dept: UROLOGY | Facility: CLINIC | Age: 77
End: 2022-11-02
Payer: MEDICARE

## 2022-11-04 ENCOUNTER — PATIENT MESSAGE (OUTPATIENT)
Dept: UROLOGY | Facility: CLINIC | Age: 77
End: 2022-11-04
Payer: MEDICARE

## 2022-11-17 ENCOUNTER — OFFICE VISIT (OUTPATIENT)
Dept: OBSTETRICS AND GYNECOLOGY | Facility: CLINIC | Age: 77
End: 2022-11-17
Payer: MEDICARE

## 2022-11-17 ENCOUNTER — TELEPHONE (OUTPATIENT)
Dept: ADMINISTRATIVE | Facility: OTHER | Age: 77
End: 2022-11-17
Payer: MEDICARE

## 2022-11-17 VITALS
HEIGHT: 66 IN | DIASTOLIC BLOOD PRESSURE: 64 MMHG | WEIGHT: 172.81 LBS | BODY MASS INDEX: 27.77 KG/M2 | SYSTOLIC BLOOD PRESSURE: 110 MMHG

## 2022-11-17 DIAGNOSIS — N95.1 MENOPAUSAL SYMPTOMS: ICD-10-CM

## 2022-11-17 DIAGNOSIS — Z01.419 ENCOUNTER FOR GYNECOLOGICAL EXAMINATION WITHOUT ABNORMAL FINDING: Primary | ICD-10-CM

## 2022-11-17 DIAGNOSIS — Z78.0 POSTMENOPAUSAL: ICD-10-CM

## 2022-11-17 DIAGNOSIS — Z12.31 ENCOUNTER FOR SCREENING MAMMOGRAM FOR BREAST CANCER: ICD-10-CM

## 2022-11-17 PROCEDURE — 3078F PR MOST RECENT DIASTOLIC BLOOD PRESSURE < 80 MM HG: ICD-10-PCS | Mod: CPTII,S$GLB,, | Performed by: OBSTETRICS & GYNECOLOGY

## 2022-11-17 PROCEDURE — 99999 PR PBB SHADOW E&M-EST. PATIENT-LVL III: CPT | Mod: PBBFAC,,, | Performed by: OBSTETRICS & GYNECOLOGY

## 2022-11-17 PROCEDURE — 1160F PR REVIEW ALL MEDS BY PRESCRIBER/CLIN PHARMACIST DOCUMENTED: ICD-10-PCS | Mod: CPTII,S$GLB,, | Performed by: OBSTETRICS & GYNECOLOGY

## 2022-11-17 PROCEDURE — 3078F DIAST BP <80 MM HG: CPT | Mod: CPTII,S$GLB,, | Performed by: OBSTETRICS & GYNECOLOGY

## 2022-11-17 PROCEDURE — 3288F PR FALLS RISK ASSESSMENT DOCUMENTED: ICD-10-PCS | Mod: CPTII,S$GLB,, | Performed by: OBSTETRICS & GYNECOLOGY

## 2022-11-17 PROCEDURE — 3288F FALL RISK ASSESSMENT DOCD: CPT | Mod: CPTII,S$GLB,, | Performed by: OBSTETRICS & GYNECOLOGY

## 2022-11-17 PROCEDURE — 1126F PR PAIN SEVERITY QUANTIFIED, NO PAIN PRESENT: ICD-10-PCS | Mod: CPTII,S$GLB,, | Performed by: OBSTETRICS & GYNECOLOGY

## 2022-11-17 PROCEDURE — 1159F PR MEDICATION LIST DOCUMENTED IN MEDICAL RECORD: ICD-10-PCS | Mod: CPTII,S$GLB,, | Performed by: OBSTETRICS & GYNECOLOGY

## 2022-11-17 PROCEDURE — 99999 PR PBB SHADOW E&M-EST. PATIENT-LVL III: ICD-10-PCS | Mod: PBBFAC,,, | Performed by: OBSTETRICS & GYNECOLOGY

## 2022-11-17 PROCEDURE — G0101 PR CA SCREEN;PELVIC/BREAST EXAM: ICD-10-PCS | Mod: GZ,S$GLB,, | Performed by: OBSTETRICS & GYNECOLOGY

## 2022-11-17 PROCEDURE — 1159F MED LIST DOCD IN RCRD: CPT | Mod: CPTII,S$GLB,, | Performed by: OBSTETRICS & GYNECOLOGY

## 2022-11-17 PROCEDURE — 1101F PR PT FALLS ASSESS DOC 0-1 FALLS W/OUT INJ PAST YR: ICD-10-PCS | Mod: CPTII,S$GLB,, | Performed by: OBSTETRICS & GYNECOLOGY

## 2022-11-17 PROCEDURE — G0101 CA SCREEN;PELVIC/BREAST EXAM: HCPCS | Mod: GZ,S$GLB,, | Performed by: OBSTETRICS & GYNECOLOGY

## 2022-11-17 PROCEDURE — 1101F PT FALLS ASSESS-DOCD LE1/YR: CPT | Mod: CPTII,S$GLB,, | Performed by: OBSTETRICS & GYNECOLOGY

## 2022-11-17 PROCEDURE — 1160F RVW MEDS BY RX/DR IN RCRD: CPT | Mod: CPTII,S$GLB,, | Performed by: OBSTETRICS & GYNECOLOGY

## 2022-11-17 PROCEDURE — 1126F AMNT PAIN NOTED NONE PRSNT: CPT | Mod: CPTII,S$GLB,, | Performed by: OBSTETRICS & GYNECOLOGY

## 2022-11-17 PROCEDURE — 3074F PR MOST RECENT SYSTOLIC BLOOD PRESSURE < 130 MM HG: ICD-10-PCS | Mod: CPTII,S$GLB,, | Performed by: OBSTETRICS & GYNECOLOGY

## 2022-11-17 PROCEDURE — 3074F SYST BP LT 130 MM HG: CPT | Mod: CPTII,S$GLB,, | Performed by: OBSTETRICS & GYNECOLOGY

## 2022-11-17 RX ORDER — ESTRADIOL 2 MG/1
2 TABLET ORAL DAILY
Qty: 90 TABLET | Refills: 4 | Status: SHIPPED | OUTPATIENT
Start: 2022-11-17 | End: 2023-12-26 | Stop reason: SDUPTHER

## 2022-11-17 NOTE — PROGRESS NOTES
"CC: Well woman exam    Mireille Akins is a 77 y.o. female  presents for a well woman exam.    NO GYN issues    Past Medical History:   Diagnosis Date    Angiomyolipoma 2022    Asthma     last attack 15 years ago    PONV (postoperative nausea and vomiting)     Subclinical hypothyroidism      Past Surgical History:   Procedure Laterality Date    BACK SURGERY       SECTION      CHOLECYSTECTOMY      HYSTERECTOMY      knee scoped      rotator cuff surgery Right     TONSILLECTOMY       Family History   Problem Relation Age of Onset    Heart disease Mother     Prostate cancer Neg Hx     Kidney disease Neg Hx     Breast cancer Neg Hx     Colon cancer Neg Hx     Ovarian cancer Neg Hx      Social History     Tobacco Use    Smoking status: Former     Types: Cigarettes     Start date: 1965    Smokeless tobacco: Never    Tobacco comments:     quit at age 20   Substance Use Topics    Alcohol use: Not Currently    Drug use: No     OB History          4    Para   3    Term   3            AB   1    Living   3         SAB        IAB        Ectopic        Multiple        Live Births   3                 /64   Ht 5' 6" (1.676 m)   Wt 78.4 kg (172 lb 13.5 oz)   LMP  (LMP Unknown)   BMI 27.90 kg/m²     ROS:  GENERAL: Denies weight gain or weight loss. Feeling well overall.   SKIN: Denies rash or lesions.   HEAD: Denies head injury or headache.   NODES: Denies enlarged lymph nodes.   CHEST: Denies chest pain or shortness of breath.   CARDIOVASCULAR: Denies palpitations or left sided chest pain.   ABDOMEN: No abdominal pain, constipation, diarrhea, nausea, vomiting or rectal bleeding.   URINARY: No frequency, dysuria, hematuria, or burning on urination.  REPRODUCTIVE: See HPI.   BREASTS: The patient performs breast self-examination and denies pain, lumps, or nipple discharge.   HEMATOLOGIC: No easy bruisability or excessive bleeding.   MUSCULOSKELETAL: Denies joint pain or swelling. "   NEUROLOGIC: Denies syncope or weakness.   PSYCHIATRIC: Denies depression, anxiety or mood swings.    PE:   APPEARANCE: Well nourished, well developed, in no acute distress.  AFFECT: WNL, alert and oriented x 3.  SKIN: No acne or hirsutism.  NECK: Neck symmetric without masses or thyromegaly.  NODES: No inguinal, cervical, axillary or femoral lymph node enlargement.  CHEST: Good respiratory effort.   ABDOMEN: Soft. No tenderness or masses. No hepatosplenomegaly. No hernias.  BREASTS: Symmetrical, no skin changes or visible lesions. No palpable masses, nipple discharge bilaterally.  PELVIC: Normal external female genitalia without lesions. Normal hair distribution. Adequate perineal body, normal urethral meatus. Vagina atrophic without lesions or discharge. No significant cystocele or rectocele. Bimanual exam shows uterus and cervix to be surgically absent. Adnexa without masses or tenderness.  RECTAL: Rectovaginal exam confirms above with normal sphincter tone, no masses.  EXTREMITIES: No edema.    Diagnosis      ICD-10-CM ICD-9-CM    1. Encounter for gynecological examination without abnormal finding  Z01.419 V72.31       2. Menopausal symptoms  N95.1 627.2       3. Postmenopausal  Z78.0 V49.81 estradioL (ESTRACE) 2 MG tablet      4. Encounter for screening mammogram for breast cancer  Z12.31 V76.12 Mammo Digital Screening Bilat w/ Emil      Mammo Digital Screening Bilat w/ Emil              Patient was counseled today on A.C.S. Pap guidelines and recommendations for yearly pelvic exams, mammograms and monthly self breast exams; to see her PCP for other health maintenance.     No follow-ups on file.

## 2023-02-15 ENCOUNTER — PES CALL (OUTPATIENT)
Dept: ADMINISTRATIVE | Facility: CLINIC | Age: 78
End: 2023-02-15
Payer: MEDICARE

## 2023-04-26 ENCOUNTER — E-VISIT (OUTPATIENT)
Dept: INTERNAL MEDICINE | Facility: CLINIC | Age: 78
End: 2023-04-26
Payer: MEDICARE

## 2023-04-26 ENCOUNTER — PATIENT MESSAGE (OUTPATIENT)
Dept: INTERNAL MEDICINE | Facility: CLINIC | Age: 78
End: 2023-04-26

## 2023-04-26 ENCOUNTER — PATIENT MESSAGE (OUTPATIENT)
Dept: FAMILY MEDICINE | Facility: CLINIC | Age: 78
End: 2023-04-26
Payer: MEDICARE

## 2023-04-26 DIAGNOSIS — J00 ACUTE NASOPHARYNGITIS: Primary | ICD-10-CM

## 2023-04-26 PROCEDURE — 99421 OL DIG E/M SVC 5-10 MIN: CPT | Mod: 95,,, | Performed by: INTERNAL MEDICINE

## 2023-04-26 PROCEDURE — 99421 PR E&M, ONLINE DIGIT, EST, < 7 DAYS, 5-10 MINS: ICD-10-PCS | Mod: 95,,, | Performed by: INTERNAL MEDICINE

## 2023-04-26 NOTE — PROGRESS NOTES
Patient ID: Mireille Akins is a 77 y.o. female.    Chief Complaint: No chief complaint on file.    The patient initiated a request through [email or web address] on 4/26/2023 for evaluation and management with a chief complaint of cough    I evaluated the questionnaire submission on 4/26/23    [facility] Peq Evisit Upper Respitatory/Cough Questionnaire    4/26/2023 10:21 AM CDT - Filed by Patient   Do you agree to participate in an E-Visit? Yes   If you have any of the following symptoms, please present to your local ER or call 911:  I acknowledge   What is the main issue that you would like for your doctor to address today? Very bad sore throat   Are you able to take your vital signs? No   Are you currently pregnant, could you be pregnant, or are you breast feeding? None of the above   What symptoms do you currently have?  Cough;  Runny nose;  Sore throat   Have you had a fever? No   When did your symptoms first appear? 4/23/2023   In the last two weeks, have you been in close contact with someone who has COVID-19 or the Flu? No   In the last two weeks, have you worked or volunteered in a healthcare facility or as a ? Healthcare facilities include a hospital, medical or dental clinic, long-term care facility, or nursing home No   Do you live in a long-term care facility, nursing home, group home, or homeless shelter? No   List what you have done or taken to help your symptoms. Tylenol   How severe are your symptoms? Moderate   Have you taken an at home Covid test? No   Have you taken a Flu test? No   Have you been fully vaccinated for COVID? (2 Pfizer, 2 Moderna or 1 Jamar [employer] Jamar vaccine injections) Yes   Have you received a booster? Yes   Have you recieved a Flu shot? Yes   When did you recieve your Flu shot? 4/26/2023   Do you have transportation to get tested for COVID if it is indicated and ordered for you at an [facility] location? Yes   Provide any information you feel is important to your history not  asked above Sore burning throat, runny nose   Please attach any relevant images or files          Recent Labs Obtained:  No visits with results within 7 Day(s) from this visit.   Latest known visit with results is:   Lab Visit on 10/10/2022   Component Date Value Ref Range Status    WBC 10/10/2022 5.00  3.90 - 12.70 K/uL Final    RBC 10/10/2022 4.27  4.00 - 5.40 M/uL Final    Hemoglobin 10/10/2022 13.3  12.0 - 16.0 g/dL Final    Hematocrit 10/10/2022 41.0  37.0 - 48.5 % Final    MCV 10/10/2022 96  82 - 98 fL Final    MCH 10/10/2022 31.1 (H)  27.0 - 31.0 pg Final    MCHC 10/10/2022 32.4  32.0 - 36.0 g/dL Final    RDW 10/10/2022 13.3  11.5 - 14.5 % Final    Platelets 10/10/2022 255  150 - 450 K/uL Final    MPV 10/10/2022 11.1  9.2 - 12.9 fL Final    Immature Granulocytes 10/10/2022 0.2  0.0 - 0.5 % Final    Gran # (ANC) 10/10/2022 2.4  1.8 - 7.7 K/uL Final    Immature Grans (Abs) 10/10/2022 0.01  0.00 - 0.04 K/uL Final    Comment: Mild elevation in immature granulocytes is non specific and   can be seen in a variety of conditions including stress response,   acute inflammation, trauma and pregnancy. Correlation with other   laboratory and clinical findings is essential.      Lymph # 10/10/2022 2.0  1.0 - 4.8 K/uL Final    Mono # 10/10/2022 0.4  0.3 - 1.0 K/uL Final    Eos # 10/10/2022 0.2  0.0 - 0.5 K/uL Final    Baso # 10/10/2022 0.06  0.00 - 0.20 K/uL Final    nRBC 10/10/2022 0  0 /100 WBC Final    Gran % 10/10/2022 47.0  38.0 - 73.0 % Final    Lymph % 10/10/2022 40.4  18.0 - 48.0 % Final    Mono % 10/10/2022 8.0  4.0 - 15.0 % Final    Eosinophil % 10/10/2022 3.2  0.0 - 8.0 % Final    Basophil % 10/10/2022 1.2  0.0 - 1.9 % Final    Differential Method 10/10/2022 Automated   Final    Sodium 10/10/2022 137  136 - 145 mmol/L Final    Potassium 10/10/2022 4.9  3.5 - 5.1 mmol/L Final    Chloride 10/10/2022 102  95 - 110 mmol/L Final    CO2 10/10/2022 25  23 - 29 mmol/L Final    Glucose 10/10/2022 107  70 - 110 mg/dL  Final    BUN 10/10/2022 12  8 - 23 mg/dL Final    Creatinine 10/10/2022 0.8  0.5 - 1.4 mg/dL Final    Calcium 10/10/2022 9.7  8.7 - 10.5 mg/dL Final    Total Protein 10/10/2022 6.5  6.0 - 8.4 g/dL Final    Albumin 10/10/2022 3.7  3.5 - 5.2 g/dL Final    Total Bilirubin 10/10/2022 0.5  0.1 - 1.0 mg/dL Final    Comment: For infants and newborns, interpretation of results should be based  on gestational age, weight and in agreement with clinical  observations.    Premature Infant recommended reference ranges:  Up to 24 hours.............<8.0 mg/dL  Up to 48 hours............<12.0 mg/dL  3-5 days..................<15.0 mg/dL  6-29 days.................<15.0 mg/dL      Alkaline Phosphatase 10/10/2022 75  55 - 135 U/L Final    AST 10/10/2022 21  10 - 40 U/L Final    ALT 10/10/2022 16  10 - 44 U/L Final    Anion Gap 10/10/2022 10  8 - 16 mmol/L Final    eGFR 10/10/2022 >60.0  >60 mL/min/1.73 m^2 Final    Cholesterol 10/10/2022 247 (H)  120 - 199 mg/dL Final    Comment: The National Cholesterol Education Program (NCEP) has set the  following guidelines (reference ranges) for Cholesterol:  Optimal.....................<200 mg/dL  Borderline High.............200-239 mg/dL  High........................> or = 240 mg/dL      Triglycerides 10/10/2022 128  30 - 150 mg/dL Final    Comment: The National Cholesterol Education Program (NCEP) has set the  following guidelines (reference values) for triglycerides:  Normal......................<150 mg/dL  Borderline High.............150-199 mg/dL  High........................200-499 mg/dL      HDL 10/10/2022 76 (H)  40 - 75 mg/dL Final    Comment: The National Cholesterol Education Program (NCEP) has set the  following guidelines (reference values) for HDL Cholesterol:  Low...............<40 mg/dL  Optimal...........>60 mg/dL      LDL Cholesterol 10/10/2022 145.4  63.0 - 159.0 mg/dL Final    Comment: The National Cholesterol Education Program (NCEP) has set the  following guidelines  (reference values) for LDL Cholesterol:  Optimal.......................<130 mg/dL  Borderline High...............130-159 mg/dL  High..........................160-189 mg/dL  Very High.....................>190 mg/dL      HDL/Cholesterol Ratio 10/10/2022 30.8  20.0 - 50.0 % Final    Total Cholesterol/HDL Ratio 10/10/2022 3.3  2.0 - 5.0 Final    Non-HDL Cholesterol 10/10/2022 171  mg/dL Final    Comment: Risk category and Non-HDL cholesterol goals:  Coronary heart disease (CHD)or equivalent (10-year risk of CHD >20%):  Non-HDL cholesterol goal     <130 mg/dL  Two or more CHD risk factors and 10-year risk of CHD <= 20%:  Non-HDL cholesterol goal     <160 mg/dL  0 to 1 CHD risk factor:  Non-HDL cholesterol goal     <190 mg/dL      TSH 10/10/2022 4.867 (H)  0.400 - 4.000 uIU/mL Final    Free T4 10/10/2022 0.77  0.71 - 1.51 ng/dL Final       Encounter Diagnosis   Name Primary?    Acute nasopharyngitis Yes        No orders of the defined types were placed in this encounter.           No follow-ups on file.      E-Visit Time Tracking:    Day 1 Time (in minutes): 5     Total Time (in minutes): 5

## 2023-05-02 ENCOUNTER — PES CALL (OUTPATIENT)
Dept: ADMINISTRATIVE | Facility: CLINIC | Age: 78
End: 2023-05-02
Payer: MEDICARE

## 2023-05-29 ENCOUNTER — LAB VISIT (OUTPATIENT)
Dept: LAB | Facility: HOSPITAL | Age: 78
End: 2023-05-29
Attending: FAMILY MEDICINE
Payer: MEDICARE

## 2023-05-29 ENCOUNTER — OFFICE VISIT (OUTPATIENT)
Dept: FAMILY MEDICINE | Facility: CLINIC | Age: 78
End: 2023-05-29
Payer: MEDICARE

## 2023-05-29 VITALS
HEART RATE: 81 BPM | SYSTOLIC BLOOD PRESSURE: 122 MMHG | WEIGHT: 177.25 LBS | BODY MASS INDEX: 28.61 KG/M2 | DIASTOLIC BLOOD PRESSURE: 68 MMHG | OXYGEN SATURATION: 98 %

## 2023-05-29 DIAGNOSIS — M20.11 HALLUX VALGUS (ACQUIRED), RIGHT FOOT: ICD-10-CM

## 2023-05-29 DIAGNOSIS — E03.8 SUBCLINICAL HYPOTHYROIDISM: ICD-10-CM

## 2023-05-29 DIAGNOSIS — E07.89 OTHER SPECIFIED DISORDERS OF THYROID: ICD-10-CM

## 2023-05-29 DIAGNOSIS — Z01.818 PREOPERATIVE CLEARANCE: ICD-10-CM

## 2023-05-29 DIAGNOSIS — Z79.890 HORMONE REPLACEMENT THERAPY (HRT): ICD-10-CM

## 2023-05-29 DIAGNOSIS — Z01.818 PREOPERATIVE CLEARANCE: Primary | ICD-10-CM

## 2023-05-29 DIAGNOSIS — N96 HISTORY OF RECURRENT MISCARRIAGES: ICD-10-CM

## 2023-05-29 LAB
ALBUMIN SERPL BCP-MCNC: 3.6 G/DL (ref 3.5–5.2)
ALP SERPL-CCNC: 75 U/L (ref 55–135)
ALT SERPL W/O P-5'-P-CCNC: 15 U/L (ref 10–44)
ANION GAP SERPL CALC-SCNC: 10 MMOL/L (ref 8–16)
AST SERPL-CCNC: 23 U/L (ref 10–40)
BASOPHILS # BLD AUTO: 0.05 K/UL (ref 0–0.2)
BASOPHILS NFR BLD: 0.8 % (ref 0–1.9)
BILIRUB SERPL-MCNC: 0.4 MG/DL (ref 0.1–1)
BUN SERPL-MCNC: 16 MG/DL (ref 8–23)
CALCIUM SERPL-MCNC: 9.4 MG/DL (ref 8.7–10.5)
CHLORIDE SERPL-SCNC: 101 MMOL/L (ref 95–110)
CO2 SERPL-SCNC: 24 MMOL/L (ref 23–29)
CREAT SERPL-MCNC: 1 MG/DL (ref 0.5–1.4)
DIFFERENTIAL METHOD: ABNORMAL
EOSINOPHIL # BLD AUTO: 0.2 K/UL (ref 0–0.5)
EOSINOPHIL NFR BLD: 3.1 % (ref 0–8)
ERYTHROCYTE [DISTWIDTH] IN BLOOD BY AUTOMATED COUNT: 13.1 % (ref 11.5–14.5)
EST. GFR  (NO RACE VARIABLE): 58.8 ML/MIN/1.73 M^2
GLUCOSE SERPL-MCNC: 87 MG/DL (ref 70–110)
HCT VFR BLD AUTO: 40.1 % (ref 37–48.5)
HGB BLD-MCNC: 13.3 G/DL (ref 12–16)
IMM GRANULOCYTES # BLD AUTO: 0.01 K/UL (ref 0–0.04)
IMM GRANULOCYTES NFR BLD AUTO: 0.2 % (ref 0–0.5)
INR PPP: 0.9 (ref 0.8–1.2)
LYMPHOCYTES # BLD AUTO: 1.9 K/UL (ref 1–4.8)
LYMPHOCYTES NFR BLD: 30.8 % (ref 18–48)
MCH RBC QN AUTO: 31.2 PG (ref 27–31)
MCHC RBC AUTO-ENTMCNC: 33.2 G/DL (ref 32–36)
MCV RBC AUTO: 94 FL (ref 82–98)
MONOCYTES # BLD AUTO: 0.5 K/UL (ref 0.3–1)
MONOCYTES NFR BLD: 8 % (ref 4–15)
NEUTROPHILS # BLD AUTO: 3.5 K/UL (ref 1.8–7.7)
NEUTROPHILS NFR BLD: 57.1 % (ref 38–73)
NRBC BLD-RTO: 0 /100 WBC
PLATELET # BLD AUTO: 218 K/UL (ref 150–450)
PMV BLD AUTO: 10.9 FL (ref 9.2–12.9)
POTASSIUM SERPL-SCNC: 5.1 MMOL/L (ref 3.5–5.1)
PROT SERPL-MCNC: 7 G/DL (ref 6–8.4)
PROTHROMBIN TIME: 9.7 SEC (ref 9–12.5)
RBC # BLD AUTO: 4.26 M/UL (ref 4–5.4)
SODIUM SERPL-SCNC: 135 MMOL/L (ref 136–145)
TSH SERPL DL<=0.005 MIU/L-ACNC: 3.69 UIU/ML (ref 0.4–4)
WBC # BLD AUTO: 6.1 K/UL (ref 3.9–12.7)

## 2023-05-29 PROCEDURE — 36415 COLL VENOUS BLD VENIPUNCTURE: CPT | Mod: PO | Performed by: FAMILY MEDICINE

## 2023-05-29 PROCEDURE — 85025 COMPLETE CBC W/AUTO DIFF WBC: CPT | Performed by: FAMILY MEDICINE

## 2023-05-29 PROCEDURE — 1159F MED LIST DOCD IN RCRD: CPT | Mod: CPTII,S$GLB,, | Performed by: FAMILY MEDICINE

## 2023-05-29 PROCEDURE — 3288F FALL RISK ASSESSMENT DOCD: CPT | Mod: CPTII,S$GLB,, | Performed by: FAMILY MEDICINE

## 2023-05-29 PROCEDURE — 1160F RVW MEDS BY RX/DR IN RCRD: CPT | Mod: CPTII,S$GLB,, | Performed by: FAMILY MEDICINE

## 2023-05-29 PROCEDURE — 80053 COMPREHEN METABOLIC PANEL: CPT | Performed by: FAMILY MEDICINE

## 2023-05-29 PROCEDURE — 84443 ASSAY THYROID STIM HORMONE: CPT | Performed by: FAMILY MEDICINE

## 2023-05-29 PROCEDURE — 1159F PR MEDICATION LIST DOCUMENTED IN MEDICAL RECORD: ICD-10-PCS | Mod: CPTII,S$GLB,, | Performed by: FAMILY MEDICINE

## 2023-05-29 PROCEDURE — 99213 OFFICE O/P EST LOW 20 MIN: CPT | Mod: S$GLB,,, | Performed by: FAMILY MEDICINE

## 2023-05-29 PROCEDURE — 3288F PR FALLS RISK ASSESSMENT DOCUMENTED: ICD-10-PCS | Mod: CPTII,S$GLB,, | Performed by: FAMILY MEDICINE

## 2023-05-29 PROCEDURE — 3074F SYST BP LT 130 MM HG: CPT | Mod: CPTII,S$GLB,, | Performed by: FAMILY MEDICINE

## 2023-05-29 PROCEDURE — 1126F AMNT PAIN NOTED NONE PRSNT: CPT | Mod: CPTII,S$GLB,, | Performed by: FAMILY MEDICINE

## 2023-05-29 PROCEDURE — 99999 PR PBB SHADOW E&M-EST. PATIENT-LVL III: CPT | Mod: PBBFAC,,, | Performed by: FAMILY MEDICINE

## 2023-05-29 PROCEDURE — 1101F PR PT FALLS ASSESS DOC 0-1 FALLS W/OUT INJ PAST YR: ICD-10-PCS | Mod: CPTII,S$GLB,, | Performed by: FAMILY MEDICINE

## 2023-05-29 PROCEDURE — 99213 PR OFFICE/OUTPT VISIT, EST, LEVL III, 20-29 MIN: ICD-10-PCS | Mod: S$GLB,,, | Performed by: FAMILY MEDICINE

## 2023-05-29 PROCEDURE — 1126F PR PAIN SEVERITY QUANTIFIED, NO PAIN PRESENT: ICD-10-PCS | Mod: CPTII,S$GLB,, | Performed by: FAMILY MEDICINE

## 2023-05-29 PROCEDURE — 3078F PR MOST RECENT DIASTOLIC BLOOD PRESSURE < 80 MM HG: ICD-10-PCS | Mod: CPTII,S$GLB,, | Performed by: FAMILY MEDICINE

## 2023-05-29 PROCEDURE — 1160F PR REVIEW ALL MEDS BY PRESCRIBER/CLIN PHARMACIST DOCUMENTED: ICD-10-PCS | Mod: CPTII,S$GLB,, | Performed by: FAMILY MEDICINE

## 2023-05-29 PROCEDURE — 3074F PR MOST RECENT SYSTOLIC BLOOD PRESSURE < 130 MM HG: ICD-10-PCS | Mod: CPTII,S$GLB,, | Performed by: FAMILY MEDICINE

## 2023-05-29 PROCEDURE — 1101F PT FALLS ASSESS-DOCD LE1/YR: CPT | Mod: CPTII,S$GLB,, | Performed by: FAMILY MEDICINE

## 2023-05-29 PROCEDURE — 85610 PROTHROMBIN TIME: CPT | Performed by: FAMILY MEDICINE

## 2023-05-29 PROCEDURE — 3078F DIAST BP <80 MM HG: CPT | Mod: CPTII,S$GLB,, | Performed by: FAMILY MEDICINE

## 2023-05-29 PROCEDURE — 99999 PR PBB SHADOW E&M-EST. PATIENT-LVL III: ICD-10-PCS | Mod: PBBFAC,,, | Performed by: FAMILY MEDICINE

## 2023-05-29 RX ORDER — AZITHROMYCIN 250 MG/1
250 TABLET, FILM COATED ORAL
COMMUNITY
Start: 2023-04-27 | End: 2023-05-29

## 2023-05-29 RX ORDER — ALBUTEROL SULFATE 90 UG/1
AEROSOL, METERED RESPIRATORY (INHALATION)
COMMUNITY
Start: 2023-04-27 | End: 2023-05-29

## 2023-05-29 NOTE — PROGRESS NOTES
Subjective:       Patient ID: Mireille Akins is a 77 y.o. female.    Chief Complaint: Pre-op Exam    Patient Active Problem List   Diagnosis    Renal mass, right    Hormone replacement therapy (HRT)    Angiomyolipoma    Overweight with body mass index (BMI) of 28 to 28.9 in adult    Stress due to illness of family member-  - newly dx as diabetic-     Subclinical hypothyroidism      HPI  78 yo female presents today for pre op clearance.   Dr. Rea - date not yet scheduled.   Patient in 04/2022 with bunionectomy and hardware placed in first two toe of right foot. This surgery is for hardware removal and to correct hallus valgus. Patient will be under MAC anesthesia and not general anesthesia.     Spoke to Holley Reyna from podiatrist's office. MAC and local anesthesia.   Removal of dorsal plate, bone spur, and correction of hallus valgus is planned.     No cardiac history in patient. No history of MI, CAD.   Baseline METS: Good (7-10). Regular gym attendance with fitness classes. Previously playing tennis, regularly walks. Able to walk up 3-4 flights of stairs without CV limitations. Lives in 2-story home.     No history of anesthesia reaction.   Recent recovery from acute bronchitis.     Meds reviewed.     Review of Systems   All other systems reviewed and are negative.       Objective:     Vitals:    05/29/23 1103   BP: 122/68   Pulse: 81     Physical Exam  Vitals and nursing note reviewed.   Constitutional:       General: She is not in acute distress.     Appearance: Normal appearance. She is not ill-appearing, toxic-appearing or diaphoretic.   HENT:      Head: Normocephalic and atraumatic.   Eyes:      General: No scleral icterus.     Conjunctiva/sclera: Conjunctivae normal.   Cardiovascular:      Rate and Rhythm: Normal rate.      Heart sounds: Normal heart sounds. No murmur heard.  Pulmonary:      Effort: Pulmonary effort is normal. No respiratory distress.      Breath sounds: Normal  breath sounds.   Abdominal:      General: Bowel sounds are normal.   Skin:     Coloration: Skin is not pale.   Neurological:      Mental Status: She is alert. Mental status is at baseline.   Psychiatric:         Attention and Perception: Attention and perception normal.         Mood and Affect: Mood and affect normal.         Speech: Speech normal.         Behavior: Behavior normal.         Cognition and Memory: Cognition and memory normal.         Judgment: Judgment normal.     Assessment:       1. Preoperative clearance    2. Subclinical hypothyroidism    3. Hormone replacement therapy (HRT)    4. History of recurrent miscarriages    5. Other specified disorders of thyroid          Plan:   Recent relevant labs results reviewed with patient.       1. Preoperative clearance  -     IN OFFICE EKG 12-LEAD (to Muse)  -     CBC Auto Differential; Future; Expected date: 05/29/2023  -     Protime-INR; Future; Expected date: 05/29/2023  -     Comprehensive Metabolic Panel; Future; Expected date: 05/29/2023  - EKG normal.   - Patient with limited cardiac history. She is low risk for low risk procedure. Discussed overall risks and benefits, may proceed with planned surgery.     2. Subclinical hypothyroidism  -     TSH; Future; Expected date: 05/29/2023    3. Hormone replacement therapy (HRT)  - Ongoing. Early mobilization as tolerable post surgery. Restrictions as podiatry.    4. History of recurrent miscarriages  -     CBC Auto Differential; Future; Expected date: 05/29/2023  -     Protime-INR; Future; Expected date: 05/29/2023  - Heavy periods requiring hysterectomy in age 30s. sAB x 2. Otherwise no bleeding issues with prior surgeries.     Patient's questions answered. Plan reviewed with patient at the end of visit. Relevant precautions to chief complaint and reasons to seek further medical care or to contact the office sooner reviewed with patient.     Follow up if symptoms worsen or fail to improve.

## 2023-05-30 PROBLEM — N18.31 STAGE 3A CHRONIC KIDNEY DISEASE: Status: ACTIVE | Noted: 2023-05-30

## 2023-09-06 ENCOUNTER — HOSPITAL ENCOUNTER (EMERGENCY)
Facility: HOSPITAL | Age: 78
Discharge: HOME | End: 2023-09-06
Attending: EMERGENCY MEDICINE
Payer: MEDICARE

## 2023-09-06 VITALS
TEMPERATURE: 98 F | OXYGEN SATURATION: 98 % | DIASTOLIC BLOOD PRESSURE: 77 MMHG | WEIGHT: 175 LBS | BODY MASS INDEX: 28.25 KG/M2 | RESPIRATION RATE: 16 BRPM | SYSTOLIC BLOOD PRESSURE: 149 MMHG | HEART RATE: 83 BPM
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
## 2023-09-06 DIAGNOSIS — S81.812A LEG LACERATION, LEFT, INITIAL ENCOUNTER: Primary | ICD-10-CM

## 2023-09-06 PROCEDURE — 25000003 PHARM REV CODE 250: Performed by: PHYSICIAN ASSISTANT

## 2023-09-06 PROCEDURE — 12034 INTMD RPR S/TR/EXT 7.6-12.5: CPT

## 2023-09-06 PROCEDURE — 99284 EMERGENCY DEPT VISIT MOD MDM: CPT | Mod: 25

## 2023-09-06 RX ORDER — CLINDAMYCIN HYDROCHLORIDE 300 MG/1
300 CAPSULE ORAL 3 TIMES DAILY
Qty: 21 CAPSULE | Refills: 0 | Status: SHIPPED | OUTPATIENT
Start: 2023-09-06 | End: 2023-09-13

## 2023-09-06 RX ORDER — HYDROCODONE BITARTRATE AND ACETAMINOPHEN 5; 325 MG/1; MG/1
1 TABLET ORAL EVERY 8 HOURS PRN
Qty: 6 TABLET | Refills: 0 | Status: SHIPPED | OUTPATIENT
Start: 2023-09-06 | End: 2023-09-08

## 2023-09-06 RX ORDER — LIDOCAINE HYDROCHLORIDE 10 MG/ML
10 INJECTION, SOLUTION EPIDURAL; INFILTRATION; INTRACAUDAL; PERINEURAL
Status: COMPLETED | OUTPATIENT
Start: 2023-09-06 | End: 2023-09-06

## 2023-09-06 RX ADMIN — LIDOCAINE HYDROCHLORIDE 100 MG: 10 INJECTION, SOLUTION EPIDURAL; INFILTRATION; INTRACAUDAL; PERINEURAL at 09:09

## 2023-09-07 ENCOUNTER — PATIENT MESSAGE (OUTPATIENT)
Dept: INTERNAL MEDICINE | Facility: CLINIC | Age: 78
End: 2023-09-07
Payer: MEDICARE

## 2023-09-07 NOTE — ED TRIAGE NOTES
Patient states she hit left lower leg on marble tub around 7:45 pm. Bleeding controlled this time. Horizontal laceration measures approx 10 cm.     Review of patient's allergies indicates:   Allergen Reactions    Ancef [cefazolin] Rash        Patient has verified the spelling of their name and  on armband.   APPEARANCE: Patient is alert, calm, oriented x 4, and does not appear distressed.  SKIN: Skin is normal for race, warm, and dry. Normal skin turgor and mucous membranes moist. +approx 10 cm horizontal laceration across left shin. Bleeding controlled. Pain rated 5/10, patient is ambulatory

## 2023-09-07 NOTE — ED PROVIDER NOTES
Encounter Date: 2023       History     Chief Complaint   Patient presents with    Leg Injury     Pt states that she was getting in the tub and hit shin of left leg on a stool in the bathroom. Pt denies falling and blood thinners. Leg has been cleaned and bandaged by pt. Bleeding controled      78-year-old female presents to ED with concern of laceration to left lower extremity after accidentally hitting leg against corner of tub this afternoon.  Tetanus is up-to-date.  Bleeding controlled pressure.  No use of blood thinners.  No other injuries.  Pain site is sharp, nonradiating with severity 5/10.  No numbness or focal weakness.  No other acute complaints at this time.    The history is provided by the patient.     Review of patient's allergies indicates:   Allergen Reactions    Ancef [cefazolin] Rash     Past Medical History:   Diagnosis Date    Angiomyolipoma 2022    Asthma     last attack 15 years ago    PONV (postoperative nausea and vomiting)     Subclinical hypothyroidism      Past Surgical History:   Procedure Laterality Date    BACK SURGERY       SECTION      CHOLECYSTECTOMY      HYSTERECTOMY      knee scoped      rotator cuff surgery Right     TONSILLECTOMY       Family History   Problem Relation Age of Onset    Heart disease Mother     Prostate cancer Neg Hx     Kidney disease Neg Hx     Breast cancer Neg Hx     Colon cancer Neg Hx     Ovarian cancer Neg Hx      Social History     Tobacco Use    Smoking status: Former     Types: Cigarettes     Start date: 1965    Smokeless tobacco: Never    Tobacco comments:     quit at age 20   Substance Use Topics    Alcohol use: Not Currently    Drug use: No     Review of Systems   Skin:  Positive for wound.   Neurological:  Negative for weakness and numbness.       Physical Exam     Initial Vitals   BP Pulse Resp Temp SpO2   23   (!) 149/77 83 16 98 °F (36.7 °C) 98 %      MAP        --                Physical Exam    Nursing note and vitals reviewed.  Constitutional: She appears well-developed and well-nourished. She is active. She does not have a sickly appearance. She does not appear ill. No distress.   HENT:   Head: Normocephalic and atraumatic.   Neck:   Normal range of motion.  Musculoskeletal:      Cervical back: Normal range of motion.      Comments: Large, 11 cm, deep laceration noted to left lower extremity near mid tibia.  Bleeding controlled.  Distal sensations intact.  Appropriate range of motion and strength throughout left lower extremity with stable gait.  DP pulse intact.  See image below.     Neurological: She is alert. GCS eye subscore is 4. GCS verbal subscore is 5. GCS motor subscore is 6.   Skin: Skin is warm and dry.   Psychiatric: She has a normal mood and affect. Her speech is normal and behavior is normal.           ED Course   Lac Repair    Date/Time: 9/6/2023 10:03 PM    Performed by: Santosh Soto PA-C  Authorized by: Shahid Veliz MD    Consent:     Consent obtained:  Verbal    Risks discussed:  Infection, poor cosmetic result and poor wound healing  Anesthesia:     Anesthesia method:  Local infiltration    Local anesthetic:  Lidocaine 1% w/o epi  Laceration details:     Location:  Leg    Leg location:  L lower leg    Length (cm):  11  Exploration:     Hemostasis achieved with:  Direct pressure    Imaging outcome: foreign body not noted      Contaminated: no    Treatment:     Area cleansed with:  Saline and chlorhexidine    Amount of cleaning:  Extensive    Irrigation solution:  Sterile saline and sterile water    Irrigation method:  Syringe    Debridement:  None    Undermining:  None    Layers/structures repaired:  Deep subcutaneous  Deep subcutaneous:     Suture size:  3-0    Suture material:  Vicryl    Suture technique:  Simple interrupted    Number of sutures:  3  Skin repair:     Repair method:  Sutures    Suture size:  3-0    Wound skin closure material  used: Ethilon.    Suture technique:  Simple interrupted    Number of sutures:  13  Approximation:     Approximation:  Close  Repair type:     Repair type:  Intermediate  Post-procedure details:     Dressing:  Sterile dressing    Procedure completion:  Tolerated well, no immediate complications    Labs Reviewed - No data to display       Imaging Results    None          Medications   LIDOcaine (PF) 10 mg/ml (1%) injection 100 mg (100 mg Infiltration Given by Other 9/6/23 2107)     Medical Decision Making  Patient presents with concern large laceration to left lower extremity after contusing lower leg against tub just prior to arrival.  Tetanus up-to-date.  Bleeding currently controlled.  Afebrile.  Large laceration noted on exam.  No neurological deficits noted.    DDx:  Including but not limited to laceration, abrasion    Risk  Prescription drug management.               ED Course as of 09/06/23 2206   Wed Sep 06, 2023   2204 Laceration site was thoroughly cleaned then primary closed.  Patient tolerated well.  Sterile dressings applied.  Prescription written for clindamycin along with short prescription for pain medication.  Encouraged to keep area clean and dry, monitor wound healing closely, close PCP/ED follow-up for wound check and suture removal.  ED return precautions were discussed at length with patient and spouse.  They state their understanding and agree with plan. [KS]      ED Course User Index  [KS] Santosh Soto PA-C                    Clinical Impression:   Final diagnoses:  [W19.XXXA] Fall  [S81.812A] Leg laceration, left, initial encounter (Primary)        ED Disposition Condition    Discharge Stable          ED Prescriptions       Medication Sig Dispense Start Date End Date Auth. Provider    clindamycin (CLEOCIN) 300 MG capsule Take 1 capsule (300 mg total) by mouth 3 (three) times daily. for 7 days 21 capsule 9/6/2023 9/13/2023 Santosh Soto PA-C    HYDROcodone-acetaminophen (NORCO) 5-325 mg  per tablet Take 1 tablet by mouth every 8 (eight) hours as needed for Pain. 6 tablet 9/6/2023 9/8/2023 Santosh Soto PA-C          Follow-up Information       Follow up With Specialties Details Why Contact Info    Carla Cedeno MD Internal Medicine   [address] [facility] [id number] [phone]               Santosh Soto PA-C  09/06/23 1268

## 2023-09-07 NOTE — DISCHARGE INSTRUCTIONS

## 2023-09-08 NOTE — TELEPHONE ENCOUNTER
Left voicemail with new appt time and date and informed patient that they could call us to reschedule or that they can reschedule through the [facility] portal if the date or time does not work with their schedule.

## 2023-09-18 ENCOUNTER — CLINICAL SUPPORT (OUTPATIENT)
Dept: INTERNAL MEDICINE | Facility: CLINIC | Age: 78
End: 2023-09-18
Payer: MEDICARE

## 2023-09-18 DIAGNOSIS — Z23 IMMUNIZATION DUE: Primary | ICD-10-CM

## 2023-09-18 PROCEDURE — 90694 FLU VACCINE - QUADRIVALENT - ADJUVANTED: ICD-10-PCS | Mod: S$GLB,,, | Performed by: INTERNAL MEDICINE

## 2023-09-18 PROCEDURE — G0008 FLU VACCINE - QUADRIVALENT - ADJUVANTED: ICD-10-PCS | Mod: S$GLB,,, | Performed by: INTERNAL MEDICINE

## 2023-09-18 PROCEDURE — 90694 VACC AIIV4 NO PRSRV 0.5ML IM: CPT | Mod: S$GLB,,, | Performed by: INTERNAL MEDICINE

## 2023-09-18 PROCEDURE — G0008 ADMIN INFLUENZA VIRUS VAC: HCPCS | Mod: S$GLB,,, | Performed by: INTERNAL MEDICINE

## 2023-09-19 ENCOUNTER — OFFICE VISIT (OUTPATIENT)
Dept: INTERNAL MEDICINE | Facility: CLINIC | Age: 78
End: 2023-09-19
Payer: MEDICARE

## 2023-09-19 VITALS
HEIGHT: 66 IN | OXYGEN SATURATION: 98 % | BODY MASS INDEX: 29.12 KG/M2 | HEART RATE: 74 BPM | WEIGHT: 181.19 LBS | DIASTOLIC BLOOD PRESSURE: 64 MMHG | SYSTOLIC BLOOD PRESSURE: 142 MMHG

## 2023-09-19 DIAGNOSIS — Z48.02 VISIT FOR SUTURE REMOVAL: Primary | ICD-10-CM

## 2023-09-19 PROCEDURE — 99024 SUTURE REMOVAL: ICD-10-PCS | Mod: S$GLB,,, | Performed by: INTERNAL MEDICINE

## 2023-09-19 PROCEDURE — 1126F AMNT PAIN NOTED NONE PRSNT: CPT | Mod: CPTII,S$GLB,, | Performed by: INTERNAL MEDICINE

## 2023-09-19 PROCEDURE — 1159F MED LIST DOCD IN RCRD: CPT | Mod: CPTII,S$GLB,, | Performed by: INTERNAL MEDICINE

## 2023-09-19 PROCEDURE — 1100F PR PT FALLS ASSESS DOC 2+ FALLS/FALL W/INJURY/YR: ICD-10-PCS | Mod: CPTII,S$GLB,, | Performed by: INTERNAL MEDICINE

## 2023-09-19 PROCEDURE — 1159F PR MEDICATION LIST DOCUMENTED IN MEDICAL RECORD: ICD-10-PCS | Mod: CPTII,S$GLB,, | Performed by: INTERNAL MEDICINE

## 2023-09-19 PROCEDURE — 1126F PR PAIN SEVERITY QUANTIFIED, NO PAIN PRESENT: ICD-10-PCS | Mod: CPTII,S$GLB,, | Performed by: INTERNAL MEDICINE

## 2023-09-19 PROCEDURE — 99214 OFFICE O/P EST MOD 30 MIN: CPT | Mod: S$GLB,,, | Performed by: INTERNAL MEDICINE

## 2023-09-19 PROCEDURE — 99999 PR PBB SHADOW E&M-EST. PATIENT-LVL III: ICD-10-PCS | Mod: PBBFAC,,, | Performed by: INTERNAL MEDICINE

## 2023-09-19 PROCEDURE — 1100F PTFALLS ASSESS-DOCD GE2>/YR: CPT | Mod: CPTII,S$GLB,, | Performed by: INTERNAL MEDICINE

## 2023-09-19 PROCEDURE — 99999 PR PBB SHADOW E&M-EST. PATIENT-LVL III: CPT | Mod: PBBFAC,,, | Performed by: INTERNAL MEDICINE

## 2023-09-19 PROCEDURE — 3077F PR MOST RECENT SYSTOLIC BLOOD PRESSURE >= 140 MM HG: ICD-10-PCS | Mod: CPTII,S$GLB,, | Performed by: INTERNAL MEDICINE

## 2023-09-19 PROCEDURE — 1160F RVW MEDS BY RX/DR IN RCRD: CPT | Mod: CPTII,S$GLB,, | Performed by: INTERNAL MEDICINE

## 2023-09-19 PROCEDURE — 99024 POSTOP FOLLOW-UP VISIT: CPT | Mod: S$GLB,,, | Performed by: INTERNAL MEDICINE

## 2023-09-19 PROCEDURE — 3077F SYST BP >= 140 MM HG: CPT | Mod: CPTII,S$GLB,, | Performed by: INTERNAL MEDICINE

## 2023-09-19 PROCEDURE — 3078F PR MOST RECENT DIASTOLIC BLOOD PRESSURE < 80 MM HG: ICD-10-PCS | Mod: CPTII,S$GLB,, | Performed by: INTERNAL MEDICINE

## 2023-09-19 PROCEDURE — 1160F PR REVIEW ALL MEDS BY PRESCRIBER/CLIN PHARMACIST DOCUMENTED: ICD-10-PCS | Mod: CPTII,S$GLB,, | Performed by: INTERNAL MEDICINE

## 2023-09-19 PROCEDURE — 3288F PR FALLS RISK ASSESSMENT DOCUMENTED: ICD-10-PCS | Mod: CPTII,S$GLB,, | Performed by: INTERNAL MEDICINE

## 2023-09-19 PROCEDURE — 99214 PR OFFICE/OUTPT VISIT, EST, LEVL IV, 30-39 MIN: ICD-10-PCS | Mod: S$GLB,,, | Performed by: INTERNAL MEDICINE

## 2023-09-19 PROCEDURE — 3078F DIAST BP <80 MM HG: CPT | Mod: CPTII,S$GLB,, | Performed by: INTERNAL MEDICINE

## 2023-09-19 PROCEDURE — 3288F FALL RISK ASSESSMENT DOCD: CPT | Mod: CPTII,S$GLB,, | Performed by: INTERNAL MEDICINE

## 2023-09-19 NOTE — PROGRESS NOTES
Patient ID: Mireille Akins is a 78 y.o. female.    Chief Complaint: Suture / Staple Removal    HPI Mireille is a 78 y.o. female with subclinical hypothyroidism and history of kidney stone who presents for suture removal. She presented to ER on 9/6/23 after sustaining fall in the bathtub. She had large (11 cm) bleeding wound located on left lower leg. Three dissolvable sutures and 13 non-dissolvable sutures placed. She was given antibiotics as well and she has completed these. She reports the wound is healing well. No acute complaints.     Review of Systems   Skin:         See HPI   All other systems reviewed and are negative.      Objective:     Vitals:    09/19/23 1452   BP: (!) 142/64   Pulse: 74        Physical Exam  Vitals reviewed.   Constitutional:       General: She is not in acute distress.     Appearance: Normal appearance. She is not ill-appearing, toxic-appearing or diaphoretic.   HENT:      Head: Normocephalic and atraumatic.      Right Ear: External ear normal.      Left Ear: External ear normal.      Nose: Nose normal.   Eyes:      Extraocular Movements: Extraocular movements intact.      Conjunctiva/sclera: Conjunctivae normal.   Pulmonary:      Effort: Pulmonary effort is normal. No respiratory distress.   Skin:     General: Skin is warm and dry.             Comments: Large horizontal incision, 13 visible sutures, healing well with scab formation, No signs of infection   Neurological:      General: No focal deficit present.      Mental Status: She is alert and oriented to person, place, and time. Mental status is at baseline.   Psychiatric:         Mood and Affect: Mood normal.         Behavior: Behavior normal.         Thought Content: Thought content normal.         Judgment: Judgment normal.         Assessment:       1. Visit for suture removal        Plan:         Visit for suture removal  -     Suture Removal        RTC PRN    Warning signs discussed, patient to call with any further  issues or worsening of symptoms.       Parts of the above note were dictated using a voice dictation software. Please excuse any grammatical or typographical errors.

## 2023-09-19 NOTE — PROCEDURES
Suture Removal    Date/Time: 9/19/2023 2:40 PM  Location procedure was performed: [place] [facility]    Performed by: Carla Cedeno MD  Authorized by: aCrla Cedeno MD  Assisting provider: Carla Cedeno MD  Pre-operative diagnosis: laceration  Post-operative diagnosis: laceration  Body area: lower extremity  Location details: left lower leg  Description of findings: clean, well healing laceration, no signs of infection   Wound Appearance: clean, well healed, nontender and no drainage  Sutures Removed: 13  Staples Removed: 0  Post-removal: dressing applied and antibiotic ointment applied  Technical procedures used: none  Significant surgical tasks conducted by the assistant(s): none  Complications: No  Estimated blood loss (mL): 0  Specimens: No  Implants: No  Patient tolerance: Patient tolerated the procedure well with no immediate complications

## 2023-09-19 NOTE — PATIENT INSTRUCTIONS
Thirteen sutures were removed today.  The laceration is healing well and there are no signs of infection currently.  Please keep the area clean and dry.  You may wrap the area with gauze and an Ace bandage to prevent further injury or removal of scab.    Please alert me for any signs of infection such as spreading redness, worsening pain, or drainage of pus.

## 2023-09-26 ENCOUNTER — LAB VISIT (OUTPATIENT)
Dept: LAB | Facility: HOSPITAL | Age: 78
End: 2023-09-26
Attending: INTERNAL MEDICINE
Payer: MEDICARE

## 2023-09-26 DIAGNOSIS — E03.8 SUBCLINICAL HYPOTHYROIDISM: ICD-10-CM

## 2023-09-26 DIAGNOSIS — E78.5 HYPERLIPIDEMIA, UNSPECIFIED HYPERLIPIDEMIA TYPE: ICD-10-CM

## 2023-09-26 LAB
CHOLEST SERPL-MCNC: 211 MG/DL (ref 120–199)
CHOLEST/HDLC SERPL: 2.8 {RATIO} (ref 2–5)
HDLC SERPL-MCNC: 76 MG/DL (ref 40–75)
HDLC SERPL: 36 % (ref 20–50)
LDLC SERPL CALC-MCNC: 113 MG/DL (ref 63–159)
NONHDLC SERPL-MCNC: 135 MG/DL
T4 FREE SERPL-MCNC: 0.72 NG/DL (ref 0.71–1.51)
TRIGL SERPL-MCNC: 110 MG/DL (ref 30–150)
TSH SERPL DL<=0.005 MIU/L-ACNC: 4.74 UIU/ML (ref 0.4–4)

## 2023-09-26 PROCEDURE — 84439 ASSAY OF FREE THYROXINE: CPT | Performed by: INTERNAL MEDICINE

## 2023-09-26 PROCEDURE — 84443 ASSAY THYROID STIM HORMONE: CPT | Performed by: INTERNAL MEDICINE

## 2023-09-26 PROCEDURE — 36415 COLL VENOUS BLD VENIPUNCTURE: CPT | Mod: PO | Performed by: INTERNAL MEDICINE

## 2023-09-26 PROCEDURE — 80061 LIPID PANEL: CPT | Performed by: INTERNAL MEDICINE

## 2023-10-17 NOTE — TELEPHONE ENCOUNTER
No care due was identified.  [facility] Embedded Care Due Messages. Reference number: [id number].   10/17/2023 2:28:35 PM CDT

## 2023-10-18 RX ORDER — PANTOPRAZOLE SODIUM 40 MG/1
TABLET, DELAYED RELEASE ORAL
Qty: 90 TABLET | Refills: 3 | Status: SHIPPED | OUTPATIENT
Start: 2023-10-18

## 2023-10-18 NOTE — TELEPHONE ENCOUNTER
Refill Decision Note      Refill Decision Note   Mireille Akins  is requesting a refill authorization.  Brief Assessment and Rationale for Refill:  Approve     Medication Therapy Plan:         Comments:     Note composed:5:25 AM 10/18/2023             Appointments     Last Visit   9/19/2023 Carla Cedeno MD   Next Visit   10/19/2023 Carla Cedeno MD

## 2023-10-19 ENCOUNTER — OFFICE VISIT (OUTPATIENT)
Dept: INTERNAL MEDICINE | Facility: CLINIC | Age: 78
End: 2023-10-19
Payer: MEDICARE

## 2023-10-19 VITALS
BODY MASS INDEX: 28.77 KG/M2 | HEIGHT: 66 IN | SYSTOLIC BLOOD PRESSURE: 156 MMHG | WEIGHT: 179 LBS | HEART RATE: 76 BPM | DIASTOLIC BLOOD PRESSURE: 84 MMHG | OXYGEN SATURATION: 99 %

## 2023-10-19 DIAGNOSIS — E66.3 OVERWEIGHT WITH BODY MASS INDEX (BMI) OF 28 TO 28.9 IN ADULT: ICD-10-CM

## 2023-10-19 DIAGNOSIS — N18.31 STAGE 3A CHRONIC KIDNEY DISEASE: ICD-10-CM

## 2023-10-19 DIAGNOSIS — I10 ESSENTIAL HYPERTENSION: ICD-10-CM

## 2023-10-19 DIAGNOSIS — H81.399 PERIPHERAL VERTIGO, UNSPECIFIED LATERALITY: ICD-10-CM

## 2023-10-19 DIAGNOSIS — E03.8 SUBCLINICAL HYPOTHYROIDISM: ICD-10-CM

## 2023-10-19 DIAGNOSIS — D17.9 ANGIOMYOLIPOMA: ICD-10-CM

## 2023-10-19 PROCEDURE — 1101F PR PT FALLS ASSESS DOC 0-1 FALLS W/OUT INJ PAST YR: ICD-10-PCS | Mod: CPTII,S$GLB,, | Performed by: INTERNAL MEDICINE

## 2023-10-19 PROCEDURE — 99999 PR PBB SHADOW E&M-EST. PATIENT-LVL III: ICD-10-PCS | Mod: PBBFAC,,, | Performed by: INTERNAL MEDICINE

## 2023-10-19 PROCEDURE — 3077F SYST BP >= 140 MM HG: CPT | Mod: CPTII,S$GLB,, | Performed by: INTERNAL MEDICINE

## 2023-10-19 PROCEDURE — 1160F RVW MEDS BY RX/DR IN RCRD: CPT | Mod: CPTII,S$GLB,, | Performed by: INTERNAL MEDICINE

## 2023-10-19 PROCEDURE — 99999 PR PBB SHADOW E&M-EST. PATIENT-LVL III: CPT | Mod: PBBFAC,,, | Performed by: INTERNAL MEDICINE

## 2023-10-19 PROCEDURE — 3079F PR MOST RECENT DIASTOLIC BLOOD PRESSURE 80-89 MM HG: ICD-10-PCS | Mod: CPTII,S$GLB,, | Performed by: INTERNAL MEDICINE

## 2023-10-19 PROCEDURE — 1159F PR MEDICATION LIST DOCUMENTED IN MEDICAL RECORD: ICD-10-PCS | Mod: CPTII,S$GLB,, | Performed by: INTERNAL MEDICINE

## 2023-10-19 PROCEDURE — 99214 PR OFFICE/OUTPT VISIT, EST, LEVL IV, 30-39 MIN: ICD-10-PCS | Mod: S$GLB,,, | Performed by: INTERNAL MEDICINE

## 2023-10-19 PROCEDURE — 3079F DIAST BP 80-89 MM HG: CPT | Mod: CPTII,S$GLB,, | Performed by: INTERNAL MEDICINE

## 2023-10-19 PROCEDURE — 99214 OFFICE O/P EST MOD 30 MIN: CPT | Mod: S$GLB,,, | Performed by: INTERNAL MEDICINE

## 2023-10-19 PROCEDURE — 1159F MED LIST DOCD IN RCRD: CPT | Mod: CPTII,S$GLB,, | Performed by: INTERNAL MEDICINE

## 2023-10-19 PROCEDURE — 1101F PT FALLS ASSESS-DOCD LE1/YR: CPT | Mod: CPTII,S$GLB,, | Performed by: INTERNAL MEDICINE

## 2023-10-19 PROCEDURE — 1160F PR REVIEW ALL MEDS BY PRESCRIBER/CLIN PHARMACIST DOCUMENTED: ICD-10-PCS | Mod: CPTII,S$GLB,, | Performed by: INTERNAL MEDICINE

## 2023-10-19 PROCEDURE — 3288F FALL RISK ASSESSMENT DOCD: CPT | Mod: CPTII,S$GLB,, | Performed by: INTERNAL MEDICINE

## 2023-10-19 PROCEDURE — 3288F PR FALLS RISK ASSESSMENT DOCUMENTED: ICD-10-PCS | Mod: CPTII,S$GLB,, | Performed by: INTERNAL MEDICINE

## 2023-10-19 PROCEDURE — 3077F PR MOST RECENT SYSTOLIC BLOOD PRESSURE >= 140 MM HG: ICD-10-PCS | Mod: CPTII,S$GLB,, | Performed by: INTERNAL MEDICINE

## 2023-10-19 NOTE — PROGRESS NOTES
Patient ID: Mireille Akins is a 78 y.o. female.    Chief Complaint: Annual Exam    HPI Mireille is a 78 y.o. female with angiomyolipoma, subclinical hypothyroidism and history of kidney stone who presents for annual exam. Reviewed lab results from 9/26/23. She reports episodes of feeling off balance. Usually associated with seasonal allergies and resolves when she uses anti histamine such as loratadine. No further complaints/concerns.     Health Maintenance Topics with due status: Not Due       Topic Last Completion Date    DEXA Scan 11/11/2021    TETANUS VACCINE 11/26/2021    Lipid Panel 09/26/2023      Review of Systems   Neurological:  Positive for dizziness.   All other systems reviewed and are negative.     Objective:     Vitals:    10/19/23 0954   BP: (!) 156/84   Pulse: 76        Physical Exam  Vitals reviewed.   Constitutional:       General: She is not in acute distress.     Appearance: Normal appearance. She is well-developed. She is not ill-appearing, toxic-appearing or diaphoretic.   HENT:      Head: Normocephalic and atraumatic.      Right Ear: External ear normal.      Left Ear: External ear normal.      Nose: Nose normal.   Eyes:      General: No scleral icterus.        Right eye: No discharge.         Left eye: No discharge.      Extraocular Movements: Extraocular movements intact.      Conjunctiva/sclera: Conjunctivae normal.   Cardiovascular:      Rate and Rhythm: Normal rate and regular rhythm.      Heart sounds: Normal heart sounds. No murmur heard.     No friction rub. No gallop.   Pulmonary:      Effort: Pulmonary effort is normal. No respiratory distress.      Breath sounds: Normal breath sounds. No stridor. No wheezing, rhonchi or rales.   Abdominal:      General: Bowel sounds are normal. There is no distension.      Palpations: Abdomen is soft. There is no mass.      Tenderness: There is no abdominal tenderness. There is no guarding or rebound.      Hernia: No hernia is present.    Musculoskeletal:      Right lower leg: No edema.      Left lower leg: No edema.   Skin:     General: Skin is warm and dry.   Neurological:      General: No focal deficit present.      Mental Status: She is alert and oriented to person, place, and time. Mental status is at baseline.   Psychiatric:         Mood and Affect: Mood normal.         Behavior: Behavior normal.         Thought Content: Thought content normal.         Judgment: Judgment normal.         Assessment:       1. Subclinical hypothyroidism Chronic   2. Overweight with body mass index (BMI) of 28 to 28.9 in adult Active   3. Stage 3a chronic kidney disease Chronic   4. Angiomyolipoma Chronic   5. Peripheral vertigo, unspecified laterality Chronic   6. Essential hypertension Active       Plan:         Subclinical hypothyroidism  Comments:  Previously on thyroid medication. Continue to monitor   Orders:  -     TSH; Future; Expected date: 02/01/2024    Overweight with body mass index (BMI) of 28 to 28.9 in adult  -     Lipid Panel; Future; Expected date: 02/01/2024    Stage 3a chronic kidney disease  Comments:  Continue to monitor   Orders:  -     CBC Auto Differential; Future; Expected date: 02/01/2024  -     Comprehensive Metabolic Panel; Future; Expected date: 02/01/2024    Angiomyolipoma  Comments:  I discussed this diagnosis with her one year ago. I referred her to urology. She did not go.     Peripheral vertigo, unspecified laterality  Comments:  Continue anti histamines PRN. If no relief, alert me for ENT and PT referrals    Essential hypertension  Comments:  Pt refused medication for this today. See AVS        RTC 6 months     Warning signs discussed, patient to call with any further issues or worsening of symptoms.       Parts of the above note were dictated using a voice dictation software. Please excuse any grammatical or typographical errors.

## 2023-10-19 NOTE — PATIENT INSTRUCTIONS
Please check your blood pressure with your upper arm cuff daily and then please call in one week and ask for Josue Quiroz (medical assistant) and give the readings to him.

## 2023-11-03 ENCOUNTER — TELEPHONE (OUTPATIENT)
Dept: INTERNAL MEDICINE | Facility: CLINIC | Age: 78
End: 2023-11-03
Payer: MEDICARE

## 2023-11-24 ENCOUNTER — TELEPHONE (OUTPATIENT)
Dept: OBSTETRICS AND GYNECOLOGY | Facility: CLINIC | Age: 78
End: 2023-11-24
Payer: MEDICARE

## 2023-11-24 NOTE — TELEPHONE ENCOUNTER
Called patient. No answer. Left voice message for patient to call the office.      Appointment needs to be rescheduled.

## 2023-11-27 ENCOUNTER — TELEPHONE (OUTPATIENT)
Dept: OBSTETRICS AND GYNECOLOGY | Facility: CLINIC | Age: 78
End: 2023-11-27
Payer: MEDICARE

## 2023-11-27 NOTE — TELEPHONE ENCOUNTER
----- Message from Sherrill Burns sent at 11/27/2023  4:40 PM CST -----  Regarding: appointment  Name of Who is Calling: Mireille           What is the request in detail: Patient is requesting a call back to reschedule her appointment before January and to have her mammogram order faxed to [facility].            Can the clinic reply by [facility]: No           What Number to Call Back if not in [facility]: [phone]

## 2023-11-30 ENCOUNTER — TELEPHONE (OUTPATIENT)
Dept: OBSTETRICS AND GYNECOLOGY | Facility: CLINIC | Age: 78
End: 2023-11-30
Payer: MEDICARE

## 2023-12-13 ENCOUNTER — TELEPHONE (OUTPATIENT)
Dept: OBSTETRICS AND GYNECOLOGY | Facility: CLINIC | Age: 78
End: 2023-12-13
Payer: MEDICARE

## 2023-12-13 NOTE — TELEPHONE ENCOUNTER
----- Message from Mikayla Moreira MA sent at 12/13/2023 10:02 AM CST -----  Contact: patient    ----- Message -----  From: Gifty Pena  Sent: 12/13/2023   9:59 AM CST  To: [facility] Staff    Type:  Patient Call          Who Called: patient         Does the patient know what this is regarding?: Requesting a call back from a missed  call ;please advise           Would the patient rather a call back or a response via [facility]?call           Best Call Back Number:[phone] ([place])              Additional Information:

## 2023-12-19 ENCOUNTER — TELEPHONE (OUTPATIENT)
Dept: INTERNAL MEDICINE | Facility: CLINIC | Age: 78
End: 2023-12-19
Payer: MEDICARE

## 2023-12-19 NOTE — TELEPHONE ENCOUNTER
----- Message from Lakisha Blackburn sent at 12/19/2023  1:37 PM CST -----  Needs advice from nurse:      Who Called:pt  Regarding:pt having eye surgery on 1/22/will be dropping off paperwork that needs to be filled out  Would the patient rather a call back or VIA [facility]?  Best Call Back number:[phone]  Additional Info:

## 2023-12-20 ENCOUNTER — TELEPHONE (OUTPATIENT)
Dept: INTERNAL MEDICINE | Facility: CLINIC | Age: 78
End: 2023-12-20
Payer: MEDICARE

## 2023-12-26 DIAGNOSIS — Z78.0 POSTMENOPAUSAL: ICD-10-CM

## 2023-12-26 RX ORDER — ESTRADIOL 2 MG/1
2 TABLET ORAL DAILY
Qty: 90 TABLET | Refills: 4 | Status: SHIPPED | OUTPATIENT
Start: 2023-12-26 | End: 2024-01-12 | Stop reason: SDUPTHER

## 2023-12-26 NOTE — TELEPHONE ENCOUNTER
----- Message from Jose Rice sent at 12/26/2023 10:07 AM CST -----      Can the clinic reply in [facility]:Y        Please refill the medication(s) listed below. Please call the patient when the prescription(s) is ready for  at this phone number         Medication #1 estradioL (ESTRACE) 2 MG tablet    Medication #2       Preferred Pharmacy:   [facility] [place] - [address]

## 2023-12-27 ENCOUNTER — TELEPHONE (OUTPATIENT)
Dept: FAMILY MEDICINE | Facility: CLINIC | Age: 78
End: 2023-12-27
Payer: MEDICARE

## 2023-12-27 NOTE — TELEPHONE ENCOUNTER
----- Message from Carmella Carpio sent at 12/27/2023  2:41 PM CST -----  Type:  Paperwork for Eye Surgery     Who Called: Pt   Would the patient rather a call back or a response via [facility]? Call back   Best Call Back Number: [phone]  Additional Information: Please be advised, pt would like to know if  had filled out paperwork for eye surgery

## 2023-12-28 ENCOUNTER — DOCUMENTATION ONLY (OUTPATIENT)
Dept: INTERNAL MEDICINE | Facility: CLINIC | Age: 78
End: 2023-12-28
Payer: MEDICARE

## 2023-12-28 ENCOUNTER — TELEPHONE (OUTPATIENT)
Dept: INTERNAL MEDICINE | Facility: CLINIC | Age: 78
End: 2023-12-28
Payer: MEDICARE

## 2024-01-12 ENCOUNTER — OFFICE VISIT (OUTPATIENT)
Dept: OBSTETRICS AND GYNECOLOGY | Facility: CLINIC | Age: 79
End: 2024-01-12
Payer: MEDICARE

## 2024-01-12 VITALS
SYSTOLIC BLOOD PRESSURE: 120 MMHG | WEIGHT: 180.75 LBS | HEIGHT: 68 IN | BODY MASS INDEX: 27.39 KG/M2 | DIASTOLIC BLOOD PRESSURE: 70 MMHG

## 2024-01-12 DIAGNOSIS — Z79.890 HORMONE REPLACEMENT THERAPY (HRT): ICD-10-CM

## 2024-01-12 DIAGNOSIS — Z01.419 WELL FEMALE EXAM WITH ROUTINE GYNECOLOGICAL EXAM: Primary | ICD-10-CM

## 2024-01-12 DIAGNOSIS — Z78.0 POSTMENOPAUSAL: ICD-10-CM

## 2024-01-12 DIAGNOSIS — Z12.31 ENCOUNTER FOR SCREENING MAMMOGRAM FOR MALIGNANT NEOPLASM OF BREAST: ICD-10-CM

## 2024-01-12 PROCEDURE — 99999 PR PBB SHADOW E&M-EST. PATIENT-LVL III: CPT | Mod: PBBFAC,,, | Performed by: OBSTETRICS & GYNECOLOGY

## 2024-01-12 PROCEDURE — G0101 CA SCREEN;PELVIC/BREAST EXAM: HCPCS | Mod: GZ,S$GLB,, | Performed by: OBSTETRICS & GYNECOLOGY

## 2024-01-12 RX ORDER — ESTRADIOL 2 MG/1
2 TABLET ORAL DAILY
Qty: 90 TABLET | Refills: 3 | Status: SHIPPED | OUTPATIENT
Start: 2024-01-12

## 2024-01-12 NOTE — PROGRESS NOTES
SUBJECTIVE:   78 y.o. female   for routine gyn exam. No LMP recorded (lmp unknown). Patient has had a hysterectomy..  She has no unusual complaints. Desires refill of ERT.         Past Medical History:   Diagnosis Date    Angiomyolipoma 2022    Asthma     last attack 15 years ago    PONV (postoperative nausea and vomiting)     Subclinical hypothyroidism      Past Surgical History:   Procedure Laterality Date    BACK SURGERY       SECTION      CHOLECYSTECTOMY      HYSTERECTOMY      knee scoped      rotator cuff surgery Right     TONSILLECTOMY       Social History     Socioeconomic History    Marital status:    Tobacco Use    Smoking status: Former     Types: Cigarettes     Start date: 1965     Passive exposure: Past    Smokeless tobacco: Never    Tobacco comments:     quit at age 20   Substance and Sexual Activity    Alcohol use: Not Currently    Drug use: No    Sexual activity: Yes     Partners: Male     Birth control/protection: See Surgical Hx     Social Determinants of Health     Financial Resource Strain: Low Risk  (2022)    Overall Financial Resource Strain (CARDIA)     Difficulty of Paying Living Expenses: Not hard at all   Food Insecurity: No Food Insecurity (2022)    Hunger Vital Sign     Worried About Running Out of Food in the Last Year: Never true     Ran Out of Food in the Last Year: Never true   Transportation Needs: No Transportation Needs (2022)    PRAPARE - Transportation     Lack of Transportation (Medical): No     Lack of Transportation (Non-Medical): No   Physical Activity: Sufficiently Active (8/10/2020)    Exercise Vital Sign     Days of Exercise per Week: 3 days     Minutes of Exercise per Session: 60 min   Stress: Stress Concern Present (2022)    [employer] [place] [employer] - Occupational Stress Questionnaire     Feeling of Stress : Very much   Social Connections: Unknown (2022)    Social Connection and Isolation Panel [NHANES]      Marital Status:    Housing Stability: Unknown (2022)    Housing Stability Vital Sign     Unable to Pay for Housing in the Last Year: No     Unstable Housing in the Last Year: No     Family History   Problem Relation Age of Onset    Heart disease Mother     Prostate cancer Neg Hx     Kidney disease Neg Hx     Breast cancer Neg Hx     Colon cancer Neg Hx     Ovarian cancer Neg Hx      OB History    Para Term  AB Living   4 3 3   1 3   SAB IAB Ectopic Multiple Live Births           3      # Outcome Date GA Lbr Contreras/2nd Weight Sex Delivery Anes PTL Lv   4 AB            3 Term     F CS-Unspec EPI     2 Term     F Vag-Spont EPI     1 Term     F Vag-Spont            Current Outpatient Medications   Medication Sig Dispense Refill    estradioL (ESTRACE) 2 MG tablet Take 1 tablet (2 mg total) by mouth once daily. 90 tablet 3    MULTIVITAMIN W-MINERALS/LUTEIN (CENTRUM SILVER ORAL) Take 1 tablet by mouth once daily.      pantoprazole (PROTONIX) 40 MG tablet TAKE ONE TABLET BY MOUTH EVERY DAY 90 tablet 3     No current facility-administered medications for this visit.     Allergies: Patient has no active allergies.     ROS:  Constitutional: no weight loss, weight gain, fever, fatigue  Eyes:  No vision changes, glasses/contacts  ENT/Mouth: No ulcers, sinus problems, ears ringing, headache  Cardiovascular: No inability to lie flat, chest pain, exercise intolerance, swelling, heart palpitations  Respiratory: No wheezing, coughing blood, shortness of breath, or cough  Gastrointestinal: No diarrhea, bloody stool, nausea/vomiting, constipation, gas, hemorrhoids  Genitourinary: No blood in urine, painful urination, urgency of urination, frequency of urination, incomplete emptying, incontinence, abnormal bleeding, painful periods, heavy periods, vaginal discharge, vaginal odor, painful intercourse, sexual problems, bleeding after intercourse.  Musculoskeletal: No muscle weakness  Skin/Breast: No painful breasts,  "nipple discharge, masses, rash, ulcers  Neurological: No passing out, seizures, numbness, headache  Endocrine: No diabetes, hypothyroid, hyperthyroid, hot flashes, hair loss, abnormal hair growth, acne  Psychiatric: No depression, crying  Hematologic: No bruises, bleeding, swollen lymph nodes, anemia.      OBJECTIVE:   The patient appears well, alert, oriented x 3, in no distress.  /70 (BP Location: Right arm, Patient Position: Sitting, BP Method: Medium (Manual))   Ht 5' 8" (1.727 m)   Wt 82 kg (180 lb 12.4 oz)   LMP  (LMP Unknown)   BMI 27.49 kg/m²   NECK: no thyromegaly, trachea midline  SKIN: no acne, striae, hirsutism  CHEST: CTAB  CV: RRR  BREAST EXAM: breasts appear normal, no suspicious masses, no skin or nipple changes or axillary nodes  ABDOMEN: no hernias, masses, or hepatosplenomegaly  GENITALIA: normal external genitalia, no erythema, no discharge  URETHRA: normal urethra, normal urethral meatus  VAGINA: Normal  CERVIX: absent  UTERUS: absent  ADNEXA: no mass, fullness, tenderness      ASSESSMENT:   1. Well female exam with routine gynecological exam        2. Hormone replacement therapy (HRT)        3. Postmenopausal  estradioL (ESTRACE) 2 MG tablet      4. Encounter for screening mammogram for malignant neoplasm of breast  Mammo Digital Screening Bilat w/ [facility]    Mammo Digital Screening Bilat w/ [facility]          PLAN:   Orders Placed This Encounter    Mammo Digital Screening Bilat w/ [facility]    estradioL (ESTRACE) 2 MG tablet     Discussed ERT, WHI, healthy lifestyle including regular exercise, healthy eating, etc.  Return to clinic in 1 year    "

## 2024-01-30 ENCOUNTER — TELEPHONE (OUTPATIENT)
Dept: INTERNAL MEDICINE | Facility: CLINIC | Age: 79
End: 2024-01-30
Payer: MEDICARE

## 2024-01-30 NOTE — TELEPHONE ENCOUNTER
----- Message from Fransisca Quintana sent at 1/30/2024  8:53 AM CST -----  Type:  Needs Medical Advice    Who Called:  Pt  Symptoms (please be specific):  Right foot issues/infection  How long has patient had these symptoms:  2 days  Pharmacy name and phone #:    Would the patient rather a call back or a response via [facility]?  call  Best Call Back Number:  [phone]  Additional Information:  Pt would like a call back from RN if possible.  No sooner  appt on my end to schedule pt.

## 2024-02-01 ENCOUNTER — OFFICE VISIT (OUTPATIENT)
Dept: INTERNAL MEDICINE | Facility: CLINIC | Age: 79
End: 2024-02-01
Payer: MEDICARE

## 2024-02-01 ENCOUNTER — HOSPITAL ENCOUNTER (OUTPATIENT)
Dept: RADIOLOGY | Facility: HOSPITAL | Age: 79
Discharge: HOME | End: 2024-02-01
Attending: INTERNAL MEDICINE
Payer: MEDICARE

## 2024-02-01 VITALS
DIASTOLIC BLOOD PRESSURE: 62 MMHG | BODY MASS INDEX: 27.19 KG/M2 | HEART RATE: 79 BPM | HEIGHT: 68 IN | SYSTOLIC BLOOD PRESSURE: 122 MMHG | OXYGEN SATURATION: 99 % | WEIGHT: 179.44 LBS

## 2024-02-01 DIAGNOSIS — L08.9 FOOT INFECTION: ICD-10-CM

## 2024-02-01 DIAGNOSIS — R09.89 POOR CIRCULATION OF EXTREMITY: ICD-10-CM

## 2024-02-01 DIAGNOSIS — I73.9 PERIPHERAL ARTERIAL DISEASE: Primary | ICD-10-CM

## 2024-02-01 DIAGNOSIS — L08.9 FOOT INFECTION: Primary | ICD-10-CM

## 2024-02-01 PROCEDURE — 93926 LOWER EXTREMITY STUDY: CPT | Mod: 26,RT,, | Performed by: RADIOLOGY

## 2024-02-01 PROCEDURE — 73630 X-RAY EXAM OF FOOT: CPT | Mod: 26,RT,, | Performed by: RADIOLOGY

## 2024-02-01 PROCEDURE — 93926 LOWER EXTREMITY STUDY: CPT | Mod: TC,RT

## 2024-02-01 PROCEDURE — 99214 OFFICE O/P EST MOD 30 MIN: CPT | Mod: S$GLB,,, | Performed by: INTERNAL MEDICINE

## 2024-02-01 PROCEDURE — 99999 PR PBB SHADOW E&M-EST. PATIENT-LVL IV: CPT | Mod: PBBFAC,,, | Performed by: INTERNAL MEDICINE

## 2024-02-01 PROCEDURE — 73630 X-RAY EXAM OF FOOT: CPT | Mod: TC,FY,RT

## 2024-02-01 RX ORDER — CEPHALEXIN 500 MG/1
500 CAPSULE ORAL EVERY 8 HOURS
Qty: 21 CAPSULE | Refills: 0 | Status: SHIPPED | OUTPATIENT
Start: 2024-02-01 | End: 2024-02-08

## 2024-02-01 RX ORDER — MUPIROCIN 20 MG/G
OINTMENT TOPICAL 3 TIMES DAILY
Qty: 30 G | Refills: 3 | Status: SHIPPED | OUTPATIENT
Start: 2024-02-01 | End: 2024-04-25

## 2024-02-01 NOTE — PROGRESS NOTES
Patient ID: Mireille Akins is a 78 y.o. female.    Chief Complaint: Wound Infection    HPI Mireille is a 78 y.o. female who presents with chief complaint of wound infection.  Wound infection is located in the area of the right heel. Onset was 2 to 3 weeks ago.  Associated with symptoms of tenderness and purulent drainage.  Constant problem in duration.  Nothing making it better.      Of note, patient has followed with two podiatrists recently ( and Dr. Rea). Ever since Dr Galvan did surgery on right great toe it has been blue, cold and painful. She was told that she has good circulation and there is nothing to do. This is causing her chronic pain and impairment in mobility and affecting her quality of life.     Review of Systems   Musculoskeletal:         See HPI   All other systems reviewed and are negative.      Objective:     Vitals:    02/01/24 0815   BP: 122/62   Pulse: 79        Physical Exam  Vitals reviewed.   Constitutional:       General: She is not in acute distress.     Appearance: Normal appearance. She is not ill-appearing, toxic-appearing or diaphoretic.   HENT:      Head: Normocephalic and atraumatic.      Right Ear: External ear normal.      Left Ear: External ear normal.      Nose: Nose normal.   Eyes:      Extraocular Movements: Extraocular movements intact.      Conjunctiva/sclera: Conjunctivae normal.   Pulmonary:      Effort: Pulmonary effort is normal. No respiratory distress.   Musculoskeletal:      Comments: Right foot examined: 2+ DP pulse.   Great toe is blue and cold to touch.   Small wound on ankle with surrounding erythema, no drainage.    Neurological:      General: No focal deficit present.      Mental Status: She is alert and oriented to person, place, and time. Mental status is at baseline.   Psychiatric:         Mood and Affect: Mood normal.         Behavior: Behavior normal.         Thought Content: Thought content normal.         Judgment: Judgment  normal.         Assessment:       1. Foot infection Active   2. Poor circulation of extremity Active       Plan:         Foot infection  -     X-Ray Foot Complete 3 view Right; Future; Expected date: 02/01/2024  -     US Lower Extremity Arteries Right; Future; Expected date: 02/01/2024  -     mupirocin (BACTROBAN) 2 % ointment; Apply topically 3 (three) times daily.  Dispense: 30 g; Refill: 3  -     Ambulatory referral/consult to Podiatry; Future; Expected date: 02/08/2024  -     cephALEXin (KEFLEX) 500 MG capsule; Take 1 capsule (500 mg total) by mouth every 8 (eight) hours. for 7 days  Dispense: 21 capsule; Refill: 0    Poor circulation of extremity  -     US Lower Extremity Arteries Right; Future; Expected date: 02/01/2024          RTC PRN      Warning signs discussed, patient to call with any further issues or worsening of symptoms.       Parts of the above note were dictated using a voice dictation software. Please excuse any grammatical or typographical errors.

## 2024-02-02 ENCOUNTER — OFFICE VISIT (OUTPATIENT)
Dept: ORTHOPEDICS | Facility: CLINIC | Age: 79
End: 2024-02-02
Payer: MEDICARE

## 2024-02-02 ENCOUNTER — TELEPHONE (OUTPATIENT)
Dept: INTERNAL MEDICINE | Facility: CLINIC | Age: 79
End: 2024-02-02
Payer: MEDICARE

## 2024-02-02 DIAGNOSIS — Z98.890 HISTORY OF FOOT SURGERY: Primary | ICD-10-CM

## 2024-02-02 DIAGNOSIS — M79.671 RIGHT FOOT PAIN: ICD-10-CM

## 2024-02-02 DIAGNOSIS — L03.115 CELLULITIS OF RIGHT HEEL: ICD-10-CM

## 2024-02-02 PROCEDURE — 99999 PR PBB SHADOW E&M-EST. PATIENT-LVL II: CPT | Mod: PBBFAC,,, | Performed by: ORTHOPAEDIC SURGERY

## 2024-02-02 PROCEDURE — 99203 OFFICE O/P NEW LOW 30 MIN: CPT | Mod: S$GLB,,, | Performed by: ORTHOPAEDIC SURGERY

## 2024-02-02 NOTE — PROGRESS NOTES
Subjective:      Patient ID: Mireille Akins is a 78 y.o. female.    Chief Complaint: Pain of the Right Foot      HPI:  This is a 74-year-old female who presents for evaluation of multifocal pain of her right foot.  She has a complex surgical history of the right foot.  In 2022 she underwent what sounds like a Lapidus bunion reconstruction with the 1st TMT fusion and 2nd hammertoe repair which she states she recovered from fairly well but subsequently the hardware on the dorsum of the foot was very prominent and she had some slight recurrence of deformity of her big toe, so in June 2023 she underwent a hardware removal of the plate and screws from the dorsum of her foot as well as an Akin osteotomy of the proximal phalanx of the big toe.  Subsequent to the last surgery she reports that her big toe was noticeably shorter and she began having more pain at the end of the 2nd toe.  In October 2023 she traveled out of the country for a couple of weeks and when she got home she was having arch pain consistent with plantar fasciitis and was seen by Dr. Rea and was treated with a corticosteroid injection in the heel around mid November and she underwent a couple of weeks of therapy and her heel pain got better.  She reports that about three weeks ago she developed some increased pain, redness and some slight drainage from the medial aspect of her heel as well as some hard black pin had size lesions which she thought might be due to bone spurs because of sharp pain.  She was seen by her physical therapist a couple of days ago and he had significant concern about the heel and possible infection.  She was seen by her primary care physician yesterday and was evaluated and placed on an oral antibiotic as well as some antibiotic cream.  She also had an arterial ultrasound of her lower extremity due to bluish discoloration of the big toe.  The ultrasound showed some occlusion of the superficial femoral artery and  she has an appointment early next week with a vascular specialist.  The primary foci of her pain is on the medial heel and that the end of the 2nd toe.  She comes in for my evaluation.    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 for 7 days, Disp: 21 capsule, Rfl: 0    estradioL (ESTRACE) 2 MG tablet, Take 1 tablet (2 mg total) by mouth once daily., Disp: 90 tablet, Rfl: 3    MULTIVITAMIN W-MINERALS/LUTEIN (CENTRUM SILVER ORAL), Take 1 tablet by mouth once daily., Disp: , Rfl:     mupirocin (BACTROBAN) 2 % ointment, Apply topically 3 (three) times daily., Disp: 30 g, Rfl: 3    pantoprazole (PROTONIX) 40 MG tablet, TAKE ONE TABLET BY MOUTH EVERY DAY, Disp: 90 tablet, Rfl: 3  Review of patient's allergies indicates:  No Known Allergies    LMP  (LMP Unknown)     ROS:  Negative for fevers, chest pain, shortness of breath, or unexplained weight loss      Objective:    Ortho Exam       This is a pleasant well-developed well-nourished female who walks in with a slight antalgic gait.  On standing inspection she has plantigrade alignment of her feet.  She does have discoloration of the right foot compared to the left with bluish discoloration of the big toe and to some degree the lesser toes.  She has good alignment of her big toe and her lesser toes.  On sitting inspection there are a couple of what appeared to be small splits of the skin possibly due to dryness which are not draining at this point but there is some surrounding erythema.  There are no areas of fluctuance about the heel.  In addition there are about three black pin had type lesions that are firm and tender and appear to be more like small scabs.  I tried to unroof them with a pickup  but could not completely remove the scabs.  The right foot is noticeably cooler than the left foot to palpation.  I can palpate a pulse easily on the left side but it is difficult for me to feel the pulse in the right foot.  Her primary care physician reported a palpable pulse on the right side.  There is no tenderness plantarly over the origin of the plantar fascia.  There is no significant tenderness over the medial aspect of the midfoot.  She has painless motion of the big toe.  She has tenderness over the end of the right 2nd toe.  She does have good sensation.      Assessment:     Imaging:  I reviewed an x-ray of the right foot that was performed on 02/01/2024 which reveals evidence of her previous surgery.  There are no significant bony abnormalities around the heel.  There is a plantar osteophyte which would be consistent with a history of plantar fasciitis.  There is a screw spanning the cuneiform bones from her previous bunion surgery and there are changes around the 1st TMT joint consistent with previous Lapidus reconstruction.  There is a screw in the 2nd toe spanning both the D IP and PIP joints; the screw does not appear to be protruding from the end of the distal phalanx.  There is a staple in the middle of the proximal phalanx of the big toe from her Akin osteotomy.  The big toe is significantly shorter than the 2nd toe.        1. History of right foot surgery        2. Right foot pain        3. Cellulitis of right heel                Plan:          Recommendation:  I agree with the recommendation for evaluation by a vascular specialist.  I advised her to complete her course of oral antibiotics and to keep the small wounds on the heel covered when she is out and about and wearing shoes.  If her vascular status is okay she could consider having the screw removed from the 2nd toe, but I would not recommend any further extensive surgery on her right foot.  Unfortunately, do not have any good  recommendations for further pain relief at this time.

## 2024-02-07 ENCOUNTER — HOSPITAL ENCOUNTER (OUTPATIENT)
Dept: CARDIOLOGY | Facility: HOSPITAL | Age: 79
Discharge: HOME | End: 2024-02-07
Attending: SURGERY
Payer: MEDICARE

## 2024-02-07 ENCOUNTER — INITIAL CONSULT (OUTPATIENT)
Dept: VASCULAR SURGERY | Facility: CLINIC | Age: 79
End: 2024-02-07
Payer: MEDICARE

## 2024-02-07 VITALS
BODY MASS INDEX: 26.98 KG/M2 | HEART RATE: 74 BPM | RESPIRATION RATE: 17 BRPM | SYSTOLIC BLOOD PRESSURE: 132 MMHG | HEIGHT: 68 IN | DIASTOLIC BLOOD PRESSURE: 80 MMHG | WEIGHT: 178 LBS

## 2024-02-07 DIAGNOSIS — I73.9 PERIPHERAL ARTERIAL DISEASE: ICD-10-CM

## 2024-02-07 DIAGNOSIS — I73.9 PERIPHERAL ARTERIAL DISEASE: Primary | ICD-10-CM

## 2024-02-07 PROBLEM — N18.31 STAGE 3A CHRONIC KIDNEY DISEASE: Status: RESOLVED | Noted: 2023-05-30 | Resolved: 2024-02-07

## 2024-02-07 LAB
LEFT ABI: 0.87
LEFT ARM BP: 156 MMHG
LEFT DORSALIS PEDIS: 135 MMHG
LEFT POSTERIOR TIBIAL: 119 MMHG
RIGHT ABI: 0.49
RIGHT ARM BP: 142 MMHG
RIGHT DORSALIS PEDIS: 45 MMHG
RIGHT POSTERIOR TIBIAL: 76 MMHG

## 2024-02-07 PROCEDURE — 93922 UPR/L XTREMITY ART 2 LEVELS: CPT | Mod: 26,,, | Performed by: INTERNAL MEDICINE

## 2024-02-07 PROCEDURE — 93922 UPR/L XTREMITY ART 2 LEVELS: CPT

## 2024-02-07 PROCEDURE — 99203 OFFICE O/P NEW LOW 30 MIN: CPT | Mod: S$GLB,,, | Performed by: SURGERY

## 2024-02-07 PROCEDURE — 99999 PR PBB SHADOW E&M-EST. PATIENT-LVL III: CPT | Mod: PBBFAC,,, | Performed by: SURGERY

## 2024-02-07 RX ORDER — CLOPIDOGREL BISULFATE 75 MG/1
75 TABLET ORAL DAILY
Qty: 30 TABLET | Refills: 11 | Status: SHIPPED | OUTPATIENT
Start: 2024-02-07 | End: 2025-02-06

## 2024-02-07 NOTE — PROGRESS NOTES
This is Dr. Murry dictating on this Mireille Reilly.  Is a very pleasant 78-year-old female who is referred in for peripheral vascular disease.  She has not extensive history of surgery on her right foot.  She has had hardware in place.  She has had multiple procedures for bunions.  I refer you to the note by Dr. Michel rodriguez.  He has reviewed her orthopedic Foot history.  It seems that this point she has a piece of hardware that is protruding on the skin.  He may have to fix that eventually.  She does have chronic pain in that foot and difficulty.  Her internist noticed a bluish hue in her foot and obtained a vascular study which showed a right SFA high-grade stenosis.  In addition to that she says she could walk far and had no problems in October but now she has chronic pain in that foot.  She also had some ulceration on the heel which is responding to Bactroban written by her internist.  I have reviewed her chart and examined her.  She has significantly diminished pulses in that right foot with a 0-+1 DP and I can not appreciate a good PT. patient has good femoral pulses.  The wounds on her heel seem to be doing better.  But her great toe does have a a sort of deep ruborous discoloration.      I reviewed her problem list and she has basically minimal problems.  She has angio myelolipoma, she is little bit overweight.  And she has subclinical hypothyroidism.  On her chart it says she has stage 3 chronic kidney disease, however her creatinine is 1 and her GFR is appropriate.  I have reviewed her allergies with her and she is allergic to Ancef, we will add this to the chart.  Medications were reviewed.  And she is basically on multivitamins Protonix Bactroban to her heel and estrogen replacement.      I have personally reviewed her ultrasound of her right leg and it shows that she does have an area with high-grade stenosis in the superficial femoral artery.  It it is monophasic in the distal appearing lower  extremity probably due to the right SFA occlusion.    In view of the fact that Dr. Muñoz may want to do a procedure to remove this and the fact that she has diminished pulses, I believe she would benefit from a angioplasty.  I have explained the risks benefits of angioplasty including bleeding infection failure of the procedure and the potential for it not to laugh to last for an extended period of time.  I think that she would benefit from this.  I have started her on Plavix, and I believe she also needs a lipid lowering agent.  I will ask her to reach out to her internist to start her on some Lipitor.    Patient will think about when she wants it whether she wants it at [facility] or [facility].  All of her questions were answered time was spent counseling

## 2024-02-12 ENCOUNTER — TELEPHONE (OUTPATIENT)
Dept: INTERNAL MEDICINE | Facility: CLINIC | Age: 79
End: 2024-02-12
Payer: MEDICARE

## 2024-02-12 ENCOUNTER — TELEPHONE (OUTPATIENT)
Dept: VASCULAR SURGERY | Facility: CLINIC | Age: 79
End: 2024-02-12
Payer: MEDICARE

## 2024-02-12 DIAGNOSIS — I73.9 PERIPHERAL ARTERIAL DISEASE: Primary | ICD-10-CM

## 2024-02-12 RX ORDER — ATORVASTATIN CALCIUM 40 MG/1
40 TABLET, FILM COATED ORAL DAILY
Qty: 90 TABLET | Refills: 3 | Status: SHIPPED | OUTPATIENT
Start: 2024-02-12 | End: 2025-02-11

## 2024-02-12 NOTE — TELEPHONE ENCOUNTER
"----- Message from Sonia Ellis sent at 2/12/2024  7:57 AM CST -----  Regarding: pt advice  Contact: [phone]  Name Of Caller: self     Contact Preference?:  [phone]     What is the nature of the call?: would like to know when will surgery take place     Additional Notes:    "Thank you for all that you do for our patients"        "

## 2024-02-12 NOTE — TELEPHONE ENCOUNTER
Called patient and informed them that we would be setting up labs in about 3 months and that lipitor would be prescribed soon

## 2024-02-12 NOTE — TELEPHONE ENCOUNTER
Left message for the pt to call the clinic. Per Dr Murry last note, pt was to decide if she wanted to be do her angiogram at [facility] vs [facility] .

## 2024-02-12 NOTE — TELEPHONE ENCOUNTER
----- Message from Carla Cedeno MD sent at 2/12/2024  3:34 PM CST -----  Statin sent. Please schedule for lipid panel and CMP lab in 3 months. Thanks  ----- Message -----  From: Josue Quiroz MA  Sent: 2/12/2024   3:17 PM CST  To: Carla Cedeno MD    Atorvastatin and labs please  ----- Message -----  From: Sylvia Bello  Sent: 2/12/2024   9:41 AM CST  To: [facility] Staff    Type:  Needs Medical Advice    Who Called: pt    Would the patient rather a call back or a response via [facility]? Call   Best Call Back Number: [phone]  Additional Information: pt requesting a call back to discuss medication and should she continue to take it.

## 2024-02-12 NOTE — TELEPHONE ENCOUNTER
----- Message from Chery Cyr sent at 2/12/2024  3:18 PM CST -----  Type:  Needs Medical Advice    Who Called:  pt   Would the patient rather a call back or a response via [facility]? Callback   Best Call Back Number:  [phone]  Additional Information: Pt is returning a phone call to this provider office

## 2024-02-15 ENCOUNTER — OFFICE VISIT (OUTPATIENT)
Dept: PODIATRY | Facility: CLINIC | Age: 79
End: 2024-02-15
Payer: MEDICARE

## 2024-02-15 VITALS
SYSTOLIC BLOOD PRESSURE: 130 MMHG | HEIGHT: 68 IN | TEMPERATURE: 98 F | BODY MASS INDEX: 26.97 KG/M2 | DIASTOLIC BLOOD PRESSURE: 80 MMHG | WEIGHT: 177.94 LBS | HEART RATE: 75 BPM

## 2024-02-15 DIAGNOSIS — I73.9 PAD (PERIPHERAL ARTERY DISEASE): Primary | ICD-10-CM

## 2024-02-15 DIAGNOSIS — L97.511 ULCER OF RIGHT FOOT LIMITED TO BREAKDOWN OF SKIN: ICD-10-CM

## 2024-02-15 DIAGNOSIS — I73.9 PERIPHERAL ARTERIAL DISEASE: Primary | ICD-10-CM

## 2024-02-15 DIAGNOSIS — M79.671 RIGHT FOOT PAIN: ICD-10-CM

## 2024-02-15 PROCEDURE — 99999 PR PBB SHADOW E&M-EST. PATIENT-LVL IV: CPT | Mod: PBBFAC,,, | Performed by: STUDENT IN AN ORGANIZED HEALTH CARE EDUCATION/TRAINING PROGRAM

## 2024-02-15 PROCEDURE — 99204 OFFICE O/P NEW MOD 45 MIN: CPT | Mod: S$GLB,,, | Performed by: STUDENT IN AN ORGANIZED HEALTH CARE EDUCATION/TRAINING PROGRAM

## 2024-02-15 NOTE — PROGRESS NOTES
Subjective:     Patient    Mireille Akins is a 78 y.o. female.    Problem    New to [facility]. Previously followed by Dr Galvan, Dr Rea, and Dr Schmitz for her feet; prior right foot reconstruction with hardware still intact. Having pain at right 2nd toe which Dr Schmitz believes is related to 2nd toe screw. She is also having pain within the right heel starting around October which responded to steroid injections but then returned and is now constant and progressive, limits her activity. She had X rays and arterial US; arterial US confirmed severe PAD. She was seen by Dr Murry who recommended revascularization but the procedure has not yet been scheduled. She also recently saw Dr Schmitz who she says picked at the heel and the heel has not healed since.     History    History obtained from patient and review of medical records.     Past Medical History:   Diagnosis Date    Angiomyolipoma 2022    Asthma     last attack 15 years ago    PONV (postoperative nausea and vomiting)     Subclinical hypothyroidism        Past Surgical History:   Procedure Laterality Date    BACK SURGERY       SECTION      CHOLECYSTECTOMY      HYSTERECTOMY      knee scoped      rotator cuff surgery Right     TONSILLECTOMY          Objective:     Vitals  Wt Readings from Last 1 Encounters:   02/15/24 80.7 kg (177 lb 14.6 oz)     Temp Readings from Last 1 Encounters:   02/15/24 97.8 °F (36.6 °C) (Oral)     BP Readings from Last 1 Encounters:   02/15/24 130/80     Pulse Readings from Last 1 Encounters:   02/15/24 75       Dermatological Exam    Skin:  Pedal hair growth diminished and Pedal skin thin and shiny and mottled and dusky on right; scabs on medial heel                         Nails:  All nails thickened    Vascular Exam    Arteries:  Posterior tibial artery monophasic on doppler on right  Dorsalis pedis artery monophasic on doppler on right     Veins:  Telangectasia on right     Swellin+  nonpitting on right     Neurological Exam    Nephi touch test:  Right foot protective sensation intact     Musculoskeletal Exam    Footwear:  Athletic on right  Athletic on left    Gait Exam:   Ambulatory Status: Ambulatory  Gait: Antalgic  Assistive Devices: None    Foot Progression Angle:  Normal on right  Normal on left    Right Lower Extremity Additional Findings:  Diffuse right heel moderate to severe pain, worse medially  Distal 2nd toe severe pain; more moderate pain throughout the remainder of the toe  Right foot and ankle function, strength, and range of motion unremarkable except as noted above.      Imaging and Other Tests    Imaging:  Independently reviewed and interpreted imaging, findings are as follows:     02/01/24 right foot X rays: s/p surgeries with hardware intact at cuneiforms, 1st proximal phalanx, 2nd toe.     02/01/24 arterial US: R APSV 21.5 cm/s monophasic, below healing threshold     Assessment:     Encounter Diagnoses   Name Primary?    Peripheral arterial disease Yes    Right foot pain     Ulcer of right foot limited to breakdown of skin         Plan:     I counseled the patient on her conditions, their implications and medical management.    Peripheral arterial disease: chronic exacerbated  -Severe PAD RLE now with worsening duskiness and rest pain, also now with skin breakdown at medial heel.   -RLE APSV 21.5 cm/s, concern for little to no healing potential in right foot with current circulation.   -Recommend urgent followup with vascular surgery for revascularization.   -Cleanse skin breakdown daily with soap and water, apply adhesive bandage as needed.  -Physical activity as tolerated.   -Physical therapy OK but activity will be limited due to pain, consider holding off until after revascularization.   -If skin turns black or if infection develops within the right foot then report to the ED immediately.       Return to clinic in 1-2 months s/p revascularization, call sooner PRN.

## 2024-02-19 ENCOUNTER — CLINICAL SUPPORT (OUTPATIENT)
Dept: LAB | Facility: HOSPITAL | Age: 79
End: 2024-02-19
Attending: SURGERY
Payer: MEDICARE

## 2024-02-19 ENCOUNTER — HOSPITAL ENCOUNTER (OUTPATIENT)
Dept: RADIOLOGY | Facility: HOSPITAL | Age: 79
Discharge: HOME | End: 2024-02-19
Attending: SURGERY
Payer: MEDICARE

## 2024-02-19 DIAGNOSIS — I73.9 PAD (PERIPHERAL ARTERY DISEASE): ICD-10-CM

## 2024-02-19 PROCEDURE — 93010 ELECTROCARDIOGRAM REPORT: CPT | Mod: ,,, | Performed by: INTERNAL MEDICINE

## 2024-02-19 PROCEDURE — 71045 X-RAY EXAM CHEST 1 VIEW: CPT | Mod: TC,FY

## 2024-02-19 PROCEDURE — 71045 X-RAY EXAM CHEST 1 VIEW: CPT | Mod: 26,,, | Performed by: RADIOLOGY

## 2024-02-19 PROCEDURE — 93005 ELECTROCARDIOGRAM TRACING: CPT

## 2024-02-20 ENCOUNTER — ANESTHESIA EVENT (OUTPATIENT)
Dept: SURGERY | Facility: HOSPITAL | Age: 79
End: 2024-02-20
Payer: MEDICARE

## 2024-02-20 ENCOUNTER — TELEPHONE (OUTPATIENT)
Dept: VASCULAR SURGERY | Facility: CLINIC | Age: 79
End: 2024-02-20
Payer: MEDICARE

## 2024-02-20 LAB
OHS QRS DURATION: 72 MS
OHS QTC CALCULATION: 420 MS

## 2024-02-20 NOTE — ANESTHESIA PREPROCEDURE EVALUATION
02/21/2024  Mireille Akins is a 78 y.o., female.    Anesthesia Evaluation    I have reviewed the Patient Summary Reports.       I have reviewed the Medications.     Review of Systems  Anesthesia Hx:   History of prior surgery of interest to airway management or planning:  Previous anesthesia: General         Personal Hx of Anesthesia complications, Post-Operative Nausea/Vomiting, in the past, but not with recent anesthetics / prophylaxis                    Social:  Non-Smoker       Hematology/Oncology:  Hematology Normal   Oncology Normal                                   Cardiovascular:  Exercise tolerance: good                 ECG has been reviewed. PAD  Septal infarct noted on EKG - patient without any h/o heart disease.  She denies CP/SOB and has good functional status going to the gym multiple times per week.                           Pulmonary:     Denies Asthma.                    Renal/:  Renal/ Normal                 Hepatic/GI:      Denies GERD.             Neurological:  Neurology Normal                                      Endocrine:   Hypothyroidism (subclinical hypothyroidism)              Physical Exam  General:  Well nourished       Airway/Jaw/Neck:  Airway Findings: Mouth Opening: Normal     Tongue: Normal      General Airway Assessment: Adult      Mallampati: I   TM Distance: Normal, at least 6 cm   Jaw/Neck Findings:     Neck ROM: Normal ROM          Dental:  Dental Findings: In tact      Chest/Lungs:  Chest/Lungs Findings:  Normal Respiratory Rate       Heart/Vascular:  Heart Findings: Rate: Normal                         Mental Status:  Mental Status Findings:  Cooperative, Alert and Oriented         Anesthesia Plan  Type of Anesthesia, risks & benefits discussed:  Anesthesia Type:  MAC, general    Patient's Preference:   Plan Factors:          Intra-op Monitoring Plan:    Intra-op Monitoring Plan Comments:   Post Op Pain Control Plan:   Post Op Pain Control Plan Comments:     Induction:   IV  Beta Blocker:         Informed Consent: Informed consent signed with the Patient and all parties understand the risks and agree with anesthesia plan.  All questions answered.  Anesthesia consent signed with patient.  ASA Score: 3     Day of Surgery Review of History & Physical:              Ready For Surgery From Anesthesia Perspective.         Lab Results   Component Value Date    WBC 5.65 02/19/2024    HGB 13.3 02/19/2024    HCT 37.8 02/19/2024     02/19/2024    CHOL 211 (H) 09/26/2023    TRIG 110 09/26/2023    HDL 76 (H) 09/26/2023    ALT 13 02/19/2024    AST 20 02/19/2024     (L) 02/19/2024    K 4.2 02/19/2024    CL 99 02/19/2024    CREATININE 0.8 02/19/2024    BUN 11 02/19/2024    CO2 26 02/19/2024    TSH 4.744 (H) 09/26/2023    INR 0.9 05/29/2023         Physical Exam  General: Well nourished    Airway:  Mallampati: I   Mouth Opening: Normal  TM Distance: Normal, at least 6 cm  Tongue: Normal  Neck ROM: Normal ROM    Dental:  In tact    Chest/Lungs:  Normal Respiratory Rate    Heart:  Rate: Normal        Anesthesia Plan  Type of Anesthesia, risks & benefits discussed:    Anesthesia Type: MAC, general  Induction:  IV  Informed Consent: Informed consent signed with the Patient and all parties understand the risks and agree with anesthesia plan.  All questions answered.   ASA Score: 3    Ready For Surgery From Anesthesia Perspective.     .

## 2024-02-20 NOTE — PRE-PROCEDURE INSTRUCTIONS
PreOp Instructions given:   - Verbal medication information (what to hold and what to take)   - NPO guidelines 2400  - Arrival place directions given; time to be given the day before procedure by the   Surgeon's Office dosc  - Bathing with antibacterial soap   - Don't wear any jewelry or bring any valuables AM of surgery   - No makeup or moisturizer to face   - No perfume/cologne, powder, lotions or aftershave   Pt. verbalized understanding.   Pt denies any h/o Anesthesia/Sedation complications or side effects. PONV noted in Epic - pt has no recall of event  Patient does not know arrival time.  Explained that this information comes from the surgeon's office and if they haven't heard from them by 2 or 3 pm to call the office.  Patient stated an understanding.

## 2024-02-21 ENCOUNTER — HOSPITAL ENCOUNTER (OUTPATIENT)
Facility: HOSPITAL | Age: 79
Discharge: HOME | End: 2024-02-21
Attending: SURGERY | Admitting: SURGERY
Payer: MEDICARE

## 2024-02-21 ENCOUNTER — ANESTHESIA (OUTPATIENT)
Dept: SURGERY | Facility: HOSPITAL | Age: 79
End: 2024-02-21
Payer: MEDICARE

## 2024-02-21 VITALS
OXYGEN SATURATION: 97 % | TEMPERATURE: 98 F | HEIGHT: 68 IN | SYSTOLIC BLOOD PRESSURE: 127 MMHG | DIASTOLIC BLOOD PRESSURE: 65 MMHG | HEART RATE: 68 BPM | BODY MASS INDEX: 26.97 KG/M2 | RESPIRATION RATE: 15 BRPM | WEIGHT: 177.94 LBS

## 2024-02-21 DIAGNOSIS — I73.9 PAD (PERIPHERAL ARTERY DISEASE): ICD-10-CM

## 2024-02-21 PROCEDURE — 71000015 HC POSTOP RECOV 1ST HR: Performed by: SURGERY

## 2024-02-21 PROCEDURE — 37000009 HC ANESTHESIA EA ADD 15 MINS: Performed by: SURGERY

## 2024-02-21 PROCEDURE — C1760 CLOSURE DEV, VASC: HCPCS | Performed by: SURGERY

## 2024-02-21 PROCEDURE — C1876 STENT, NON-COA/NON-COV W/DEL: HCPCS | Performed by: SURGERY

## 2024-02-21 PROCEDURE — D9220A PRA ANESTHESIA: Mod: CRNA,,, | Performed by: NURSE ANESTHETIST, CERTIFIED REGISTERED

## 2024-02-21 PROCEDURE — C1894 INTRO/SHEATH, NON-LASER: HCPCS | Performed by: SURGERY

## 2024-02-21 PROCEDURE — 63600175 PHARM REV CODE 636 W HCPCS: Performed by: SURGERY

## 2024-02-21 PROCEDURE — C2623 CATH, TRANSLUMIN, DRUG-COAT: HCPCS | Performed by: SURGERY

## 2024-02-21 PROCEDURE — 36000706: Performed by: SURGERY

## 2024-02-21 PROCEDURE — 25000003 PHARM REV CODE 250: Performed by: SURGERY

## 2024-02-21 PROCEDURE — 63600175 PHARM REV CODE 636 W HCPCS: Performed by: NURSE ANESTHETIST, CERTIFIED REGISTERED

## 2024-02-21 PROCEDURE — 36000707: Performed by: SURGERY

## 2024-02-21 PROCEDURE — 37226 PR FEM/POPL REVASC W/STENT: CPT | Mod: RT,,, | Performed by: SURGERY

## 2024-02-21 PROCEDURE — 25000003 PHARM REV CODE 250: Performed by: NURSE ANESTHETIST, CERTIFIED REGISTERED

## 2024-02-21 PROCEDURE — C1887 CATHETER, GUIDING: HCPCS | Performed by: SURGERY

## 2024-02-21 PROCEDURE — C1769 GUIDE WIRE: HCPCS | Performed by: SURGERY

## 2024-02-21 PROCEDURE — 71000016 HC POSTOP RECOV ADDL HR: Performed by: SURGERY

## 2024-02-21 PROCEDURE — D9220A PRA ANESTHESIA: Mod: ANES,,, | Performed by: ANESTHESIOLOGY

## 2024-02-21 PROCEDURE — 27201423 OPTIME MED/SURG SUP & DEVICES STERILE SUPPLY: Performed by: SURGERY

## 2024-02-21 PROCEDURE — 75710 ARTERY X-RAYS ARM/LEG: CPT | Mod: 26,59,, | Performed by: SURGERY

## 2024-02-21 PROCEDURE — 63600175 PHARM REV CODE 636 W HCPCS: Performed by: ANESTHESIOLOGY

## 2024-02-21 PROCEDURE — 63600175 PHARM REV CODE 636 W HCPCS: Performed by: STUDENT IN AN ORGANIZED HEALTH CARE EDUCATION/TRAINING PROGRAM

## 2024-02-21 PROCEDURE — 71000044 HC DOSC ROUTINE RECOVERY FIRST HOUR: Performed by: SURGERY

## 2024-02-21 PROCEDURE — 37000008 HC ANESTHESIA 1ST 15 MINUTES: Performed by: SURGERY

## 2024-02-21 PROCEDURE — 25500020 PHARM REV CODE 255: Performed by: SURGERY

## 2024-02-21 DEVICE — LIFESTENT® 5F VASCULAR STENT SYSTEM, 6 MM X 150 MM (135 CM DELIVERY CATHETER)
Type: IMPLANTABLE DEVICE | Site: ARTERIAL | Status: FUNCTIONAL
Brand: LIFESTENT®

## 2024-02-21 RX ORDER — ASPIRIN 81 MG/1
81 TABLET ORAL DAILY
Qty: 360 TABLET | Refills: 0 | Status: SHIPPED | OUTPATIENT
Start: 2024-02-21 | End: 2025-02-20

## 2024-02-21 RX ORDER — CLINDAMYCIN PHOSPHATE 900 MG/50ML
INJECTION, SOLUTION INTRAVENOUS
Status: DISCONTINUED | OUTPATIENT
Start: 2024-02-21 | End: 2024-02-21

## 2024-02-21 RX ORDER — KETOROLAC TROMETHAMINE 15 MG/ML
15 INJECTION, SOLUTION INTRAMUSCULAR; INTRAVENOUS ONCE
Status: DISCONTINUED | OUTPATIENT
Start: 2024-02-21 | End: 2024-02-21

## 2024-02-21 RX ORDER — KETOROLAC TROMETHAMINE 30 MG/ML
INJECTION, SOLUTION INTRAMUSCULAR; INTRAVENOUS
Status: DISCONTINUED
Start: 2024-02-21 | End: 2024-02-21 | Stop reason: HOSPADM

## 2024-02-21 RX ORDER — HEPARIN SODIUM 1000 [USP'U]/ML
INJECTION, SOLUTION INTRAVENOUS; SUBCUTANEOUS
Status: DISCONTINUED | OUTPATIENT
Start: 2024-02-21 | End: 2024-02-21 | Stop reason: HOSPADM

## 2024-02-21 RX ORDER — DEXAMETHASONE SODIUM PHOSPHATE 4 MG/ML
INJECTION, SOLUTION INTRA-ARTICULAR; INTRALESIONAL; INTRAMUSCULAR; INTRAVENOUS; SOFT TISSUE
Status: DISCONTINUED | OUTPATIENT
Start: 2024-02-21 | End: 2024-02-21

## 2024-02-21 RX ORDER — SODIUM CHLORIDE 9 MG/ML
INJECTION, SOLUTION INTRAVENOUS CONTINUOUS
Status: DISCONTINUED | OUTPATIENT
Start: 2024-02-21 | End: 2024-02-21 | Stop reason: HOSPADM

## 2024-02-21 RX ORDER — KETOROLAC TROMETHAMINE 30 MG/ML
15 INJECTION, SOLUTION INTRAMUSCULAR; INTRAVENOUS ONCE
Status: COMPLETED | OUTPATIENT
Start: 2024-02-21 | End: 2024-02-21

## 2024-02-21 RX ORDER — LIDOCAINE HYDROCHLORIDE 20 MG/ML
INJECTION INTRAVENOUS
Status: DISCONTINUED | OUTPATIENT
Start: 2024-02-21 | End: 2024-02-21

## 2024-02-21 RX ORDER — IODIXANOL 320 MG/ML
INJECTION, SOLUTION INTRAVASCULAR
Status: DISCONTINUED | OUTPATIENT
Start: 2024-02-21 | End: 2024-02-21 | Stop reason: HOSPADM

## 2024-02-21 RX ORDER — FENTANYL CITRATE 50 UG/ML
25 INJECTION, SOLUTION INTRAMUSCULAR; INTRAVENOUS EVERY 5 MIN PRN
Status: COMPLETED | OUTPATIENT
Start: 2024-02-21 | End: 2024-02-21

## 2024-02-21 RX ORDER — FENTANYL CITRATE 50 UG/ML
INJECTION, SOLUTION INTRAMUSCULAR; INTRAVENOUS
Status: DISCONTINUED | OUTPATIENT
Start: 2024-02-21 | End: 2024-02-21

## 2024-02-21 RX ORDER — ONDANSETRON HYDROCHLORIDE 2 MG/ML
INJECTION, SOLUTION INTRAVENOUS
Status: DISCONTINUED | OUTPATIENT
Start: 2024-02-21 | End: 2024-02-21

## 2024-02-21 RX ORDER — PROPOFOL 10 MG/ML
VIAL (ML) INTRAVENOUS CONTINUOUS PRN
Status: DISCONTINUED | OUTPATIENT
Start: 2024-02-21 | End: 2024-02-21

## 2024-02-21 RX ORDER — MIDAZOLAM HYDROCHLORIDE 1 MG/ML
INJECTION, SOLUTION INTRAMUSCULAR; INTRAVENOUS
Status: DISCONTINUED | OUTPATIENT
Start: 2024-02-21 | End: 2024-02-21

## 2024-02-21 RX ORDER — DEXMEDETOMIDINE HYDROCHLORIDE 100 UG/ML
INJECTION, SOLUTION INTRAVENOUS
Status: DISCONTINUED | OUTPATIENT
Start: 2024-02-21 | End: 2024-02-21

## 2024-02-21 RX ORDER — HEPARIN SODIUM 1000 [USP'U]/ML
INJECTION, SOLUTION INTRAVENOUS; SUBCUTANEOUS
Status: DISCONTINUED | OUTPATIENT
Start: 2024-02-21 | End: 2024-02-21

## 2024-02-21 RX ORDER — LIDOCAINE HYDROCHLORIDE 10 MG/ML
INJECTION, SOLUTION EPIDURAL; INFILTRATION; INTRACAUDAL; PERINEURAL
Status: DISCONTINUED | OUTPATIENT
Start: 2024-02-21 | End: 2024-02-21 | Stop reason: HOSPADM

## 2024-02-21 RX ORDER — PROTAMINE SULFATE 10 MG/ML
INJECTION, SOLUTION INTRAVENOUS
Status: DISCONTINUED | OUTPATIENT
Start: 2024-02-21 | End: 2024-02-21

## 2024-02-21 RX ORDER — ACETAMINOPHEN 325 MG/1
650 TABLET ORAL EVERY 6 HOURS
Status: DISCONTINUED | OUTPATIENT
Start: 2024-02-21 | End: 2024-02-21 | Stop reason: HOSPADM

## 2024-02-21 RX ADMIN — SODIUM CHLORIDE: 0.9 INJECTION, SOLUTION INTRAVENOUS at 01:02

## 2024-02-21 RX ADMIN — DEXMEDETOMIDINE 8 MCG: 100 INJECTION, SOLUTION, CONCENTRATE INTRAVENOUS at 02:02

## 2024-02-21 RX ADMIN — CLINDAMYCIN IN 5 PERCENT DEXTROSE 900 MG: 18 INJECTION, SOLUTION INTRAVENOUS at 02:02

## 2024-02-21 RX ADMIN — KETOROLAC TROMETHAMINE 15 MG: 30 INJECTION, SOLUTION INTRAMUSCULAR; INTRAVENOUS at 03:02

## 2024-02-21 RX ADMIN — MIDAZOLAM HYDROCHLORIDE 1 MG: 1 INJECTION, SOLUTION INTRAMUSCULAR; INTRAVENOUS at 01:02

## 2024-02-21 RX ADMIN — FENTANYL CITRATE 25 MCG: 50 INJECTION INTRAMUSCULAR; INTRAVENOUS at 03:02

## 2024-02-21 RX ADMIN — PROTAMINE SULFATE 10 MG: 10 INJECTION, SOLUTION INTRAVENOUS at 02:02

## 2024-02-21 RX ADMIN — MIDAZOLAM HYDROCHLORIDE 1 MG: 1 INJECTION, SOLUTION INTRAMUSCULAR; INTRAVENOUS at 02:02

## 2024-02-21 RX ADMIN — DEXAMETHASONE SODIUM PHOSPHATE 4 MG: 4 INJECTION, SOLUTION INTRAMUSCULAR; INTRAVENOUS at 02:02

## 2024-02-21 RX ADMIN — FENTANYL CITRATE 50 MCG: 50 INJECTION, SOLUTION INTRAMUSCULAR; INTRAVENOUS at 02:02

## 2024-02-21 RX ADMIN — PROTAMINE SULFATE 30 MG: 10 INJECTION, SOLUTION INTRAVENOUS at 02:02

## 2024-02-21 RX ADMIN — DEXMEDETOMIDINE 4 MCG: 100 INJECTION, SOLUTION, CONCENTRATE INTRAVENOUS at 02:02

## 2024-02-21 RX ADMIN — PROPOFOL 40 MCG/KG/MIN: 10 INJECTION, EMULSION INTRAVENOUS at 02:02

## 2024-02-21 RX ADMIN — HEPARIN SODIUM 6000 UNITS: 1000 INJECTION, SOLUTION INTRAVENOUS; SUBCUTANEOUS at 02:02

## 2024-02-21 RX ADMIN — ONDANSETRON 4 MG: 2 INJECTION INTRAMUSCULAR; INTRAVENOUS at 02:02

## 2024-02-21 RX ADMIN — DEXMEDETOMIDINE 8 MCG: 100 INJECTION, SOLUTION, CONCENTRATE INTRAVENOUS at 03:02

## 2024-02-21 RX ADMIN — LIDOCAINE HYDROCHLORIDE 40 MG: 20 INJECTION INTRAVENOUS at 02:02

## 2024-02-21 NOTE — OP NOTE
Date: 02/21/2024    Surgeon: Teo Murry MD - Primary    Assistant: Jonathan Hernandez MD - Fellow    Pre-op Diagnosis: Severe peripheral arterial disease with wound  Post-op Diagnosis: Same    Procedures:   Ultrasound guided access L CFA   Aortogram and bilateral lower extremity angiogram, of note no preoperative CTA was obtained.   R SFA PTA [id number] Ultraverse followed by [id number] Lifestent  5 Fr L CFA closure Mynx    Anesthesia: Local MAC    EBL: 10cc    Findings:   R SFA CTA, crossed with V18, 5mm PTA followed by [id number] Lifestent.      Complications:  None; patient tolerated the procedure well.    Indications:   This is a 78-year-old female with history of right lower extremity wounds, found on workup to have disease of her right SFA with associated lower extremity monophasic signals, consistent with severe peripheral vascular disease.  She was offered a right lower extremity angiogram with possible intervention.  The risks, benefits, alternatives of the procedure were discussed with the patient who wished to proceed with the procedure and gave written consent.    Operative Procedure:  After an informed consent was obtained the patient was brought to the operating room and placed in the supine position. Both the patient and procedure were confirmed and identified during timeout process. The patient received perioperative antibiotics. The patient's bilateral groins were prepped and draped in usual sterile fashion. Using ultrasound guidance, the left common femoral artery vessel patency was confirmed. Then direct ultrasound guidance the left CFA was entered with a 21-G needle, Mandril wire and 4-Fr micropuncture needle. Then under fluoroscopic guidance, an 0.035-in wire was placed into the distal aorta. The micropuncture dilator was then exchanged over the wire for a 5-Ukrainian sheath. Sheathogram demonstrated access in the CFA. Next an omni catheter was placed over the wire and into the distal aorta under fluoroscopy and  an aortogram and bilateral leg angiogram was performed by selecting the right external iliac artery which demonstrated:    Aorto-iliac vessels: patent  Right Common femoral, profunda femoral arteries: Patent   Right Superficial femoral artery: Chronic total occlusion of mid-distal SFA with distal reconstitution   Right Popliteal artery: Patent  Tibials: Occluded AT, Patent TP trunk and 2 vessel PT and peroneal runoff    Based on the images from this diagnostic angio, a decision was made to intervene. We then systemically heparinized the patient with 6000u of heparin.   Next, we placed a up-and-over 5Fr 45cm charly sheath and used a V18 wire and successfully crossed the SFA  with re-entry into the distal SFA.  An 0 1 a Seeker catheter was then placed over the wire and an angiogram performed which confirmed re-entry into the SFA.  Following this, a 5 x 150 mm Ultraverse balloon was used to perform a PTA of the SFA to the nominal pressure held for a period of 3 minutes.  Posttreatment angiogram demonstrated some residual stenosis.  Following this a 5 x 150 mm LifeStent was placed into the SFA.  Posttreatment angiogram demonstrated no residual stenosis.  Follow-up angiogram demonstrated excellent runoff via the PT and peroneal. Heparin was reversed with protamine. We then deployed a 5-Japanese Mynx closure device without issue. At the conclusion of the case the patient was noted to have no evidence of a groin hematoma.    The patient tolerated the procedure well and was taken to the post anesthesia recovery unit in good condition.    All needle, sponge and instrument counts were correct at the end of the case.  Dr. Murry  was present and scrubbed for the entire case.  Patient's family was notified of the results of the surgery immediately afterwards.

## 2024-02-21 NOTE — TRANSFER OF CARE
"Anesthesia Transfer of Care Note    Patient: Mireille Akins    Procedure(s) Performed: Procedure(s) (LRB):  ANGIOGRAM, EXTREMITY, UNILATERAL, PTA , (Right)  STENT, SUPERFICIAL FEMORAL ARTERY (Right)    Patient location: PACU    Transport from OR: Transported from OR on 6-10 L/min O2 by face mask with adequate spontaneous ventilation    Post pain: adequate analgesia    Post assessment: no apparent anesthetic complications    Post vital signs: stable    Level of consciousness: awake and alert    Nausea/Vomiting: no nausea/vomiting    Complications: none    Transfer of care protocol was followed      Last vitals: Visit Vitals  BP (!) 144/74   Pulse 78   Temp 36.9 °C (98.4 °F) (Oral)   Resp 16   Ht 5' 8" (1.727 m)   Wt 80.7 kg (177 lb 14.6 oz)   LMP  (LMP Unknown)   SpO2 100%   Breastfeeding No   BMI 27.05 kg/m²     "

## 2024-02-21 NOTE — BRIEF OP NOTE
[facility] - Surgery ([facility])  Brief Operative Note    Surgery Date: 2/21/2024     Surgeon(s) and Role:     * Teo Murry MD - Primary     * Jonathan Hernandez MD - Fellow    Assisting Surgeon: None    Pre-op Diagnosis:  PAD (peripheral artery disease) [I73.9]    Post-op Diagnosis:  Post-Op Diagnosis Codes:     * PAD (peripheral artery disease) [I73.9]    Procedures:   Ultrasound guided access L CFA   R SFA PTA [id number] Ultraverse followed by [id number] Lifestent  5 Fr L CFA closure Mynx    Anesthesia: Local MAC    Operative Findings: R SFA CTA, crossed with V18, 5mm PTA followed by [id number] Lifestent.     Estimated Blood Loss: 10cc  Specimens:   Specimen (24h ago, onward)      None              Discharge Note    OUTCOME: Patient tolerated treatment/procedure well without complication and is now ready for discharge.    DISPOSITION: Home or Self Care    FINAL DIAGNOSIS:  <principal problem not specified>    FOLLOWUP: In clinic    DISCHARGE INSTRUCTIONS:  No discharge procedures on file.

## 2024-02-21 NOTE — H&P
Vascular Surgery - H and P    Subjective:     Mireille Akins is a 78 y.o. female with PVD who presents today for lower extremity angiogram today. Vascular ultrasound obtained by PCP showed right SFA high-grade stenosis.    ABIs obtained:  The right resting JOHNSON reduction is severely decreased.   The right ankle [DT] artery has monophasic flow.   The right ankle [DP] artery has monophasic flow.     PMH:   Past Medical History:   Diagnosis Date    Angiomyolipoma 2022    Asthma     last attack 15 years ago    PONV (postoperative nausea and vomiting)     Subclinical hypothyroidism        Past Surgical History:   Past Surgical History:   Procedure Laterality Date    BACK SURGERY       SECTION      CHOLECYSTECTOMY      HYSTERECTOMY      knee scoped      rotator cuff surgery Right     TONSILLECTOMY         Social History:  Social History     Socioeconomic History    Marital status:    Tobacco Use    Smoking status: Former     Types: Cigarettes     Start date: 1965     Passive exposure: Past    Smokeless tobacco: Never    Tobacco comments:     quit at age 20   Substance and Sexual Activity    Alcohol use: Not Currently    Drug use: No    Sexual activity: Yes     Partners: Male     Birth control/protection: See Surgical Hx     Social Determinants of Health     Financial Resource Strain: Low Risk  (2022)    Overall Financial Resource Strain (CARDIA)     Difficulty of Paying Living Expenses: Not hard at all   Food Insecurity: No Food Insecurity (2022)    Hunger Vital Sign     Worried About Running Out of Food in the Last Year: Never true     Ran Out of Food in the Last Year: Never true   Transportation Needs: No Transportation Needs (2022)    PRAPARE - Transportation     Lack of Transportation (Medical): No     Lack of Transportation (Non-Medical): No   Physical Activity: Sufficiently Active (8/10/2020)    Exercise Vital Sign     Days of Exercise per Week: 3 days     Minutes of  Exercise per Session: 60 min   Stress: Stress Concern Present (9/6/2022)    [employer] - Occupational Stress Questionnaire     Feeling of Stress : Very much   Social Connections: Unknown (9/6/2022)    Social Connection and Isolation Panel [NHANES]     Marital Status:    Housing Stability: Unknown (9/6/2022)    Housing Stability Vital Sign     Unable to Pay for Housing in the Last Year: No     Unstable Housing in the Last Year: No       Allergies:   Review of patient's allergies indicates:   Allergen Reactions    Ancef [cefazolin] Rash       Medications:  Current Facility-Administered Medications:     0.9%  NaCl infusion, , Intravenous, Continuous, Lucia Jones MD    No current facility-administered medications on file prior to encounter.     Current Outpatient Medications on File Prior to Encounter   Medication Sig Dispense Refill    atorvastatin (LIPITOR) 40 MG tablet Take 1 tablet (40 mg total) by mouth once daily. (Patient taking differently: Take 40 mg by mouth every evening.) 90 tablet 3    clopidogreL (PLAVIX) 75 mg tablet Take 1 tablet (75 mg total) by mouth once daily. (Patient taking differently: Take 75 mg by mouth every evening.) 30 tablet 11    estradioL (ESTRACE) 2 MG tablet Take 1 tablet (2 mg total) by mouth once daily. 90 tablet 3    MULTIVITAMIN W-MINERALS/LUTEIN (CENTRUM SILVER ORAL) Take 1 tablet by mouth once daily.      mupirocin (BACTROBAN) 2 % ointment Apply topically 3 (three) times daily. 30 g 3    pantoprazole (PROTONIX) 40 MG tablet TAKE ONE TABLET BY MOUTH EVERY DAY 90 tablet 3         Objective:     PHYSICAL EXAM:  Vital Signs (Most Recent)  Temp: 98.4 °F (36.9 °C) (02/21/24 1148)  Pulse: 78 (02/21/24 1148)  Resp: 16 (02/21/24 1148)  BP: (!) 144/74 (02/21/24 1149)  SpO2: 100 % (02/21/24 1148)    ROS A 10+ review of systems was performed with pertinent positives and negatives noted above in the history of present illness.  Other systems were  negative unless otherwise specified.    Physical Exam  Vitals and nursing note reviewed.   Constitutional:       Appearance: Normal appearance.   HENT:      Head: Normocephalic and atraumatic.   Cardiovascular:      Rate and Rhythm: Normal rate.      Pulses:           Dorsalis pedis pulses are 0 on the right side.        Posterior tibial pulses are detected w/ Doppler on the right side.   Pulmonary:      Effort: Pulmonary effort is normal.   Abdominal:      General: Abdomen is flat.      Palpations: Abdomen is soft.   Skin:     General: Skin is warm and dry.   Neurological:      Mental Status: She is alert and oriented to person, place, and time.   Psychiatric:         Mood and Affect: Mood normal.         Behavior: Behavior normal.            Specimens (From admission, onward)      None                 Assessment:     78 y.o. female with PAD who presents today for R lower extremity angiogram  angioplasty, possible stents    Plan:     - Surgical and blood consents obtained  - All questions answered  - Proceed to OR today    Lucia Jones MD   [facility]

## 2024-02-21 NOTE — DISCHARGE INSTRUCTIONS
Continue taking all of your home medications  Continue taking the aspirin 81mg and plavix daily  Remove dressing in one days  You may shower at that time, no soaking the wound (Bath, pool, hot tub, etc) for two weeks  Reasons to call the office:  - Fever over 101F  - Signs of infection at the wound (redness, pain, warmth, pus)  - Numbness, tingling, or pain in the leg/foot/groin  - Extensive bruising at the groin  - Firmness or bulge in the groin  - Bleeding from the access site in your groin    Once you go home, please abide by the following restrictions:     Rest in bed or a recliner for the remainder of the day following the procedure.       You should not go home alone the day of the procedure.     Lifting and activity restrictions depend on the insertion site for the procedure:  - Wrist or hand:   - Do not lift more than 5 pounds for the first 24 hours and do not lift more than 10 pounds with the affected arm for 1 week.   - Do not use affected arm for pulling up in bed, adjusting position, or weight bearing exercise.   - Avoid flexing the wrist, such as hammering, playing tennis, or swinging objects.   - Avoid moisture or submerging hand for 7 days (tub, swimming, dishes). Showering is OK.     Groin:   - No heavy lifting of more than 5 pounds, running, swimming, or strenuous walking for at least 2 days after the angiogram.   - You may return to your usual activity after the two days.   - Do not take a hot bath or shower for at least 12 hours after discharge.     Keep an adhesive bandage on the catheter insertion site for one day to help prevent infection.     Because of the sedation you were given for your procedure, we recommend that you have someone stay with you overnight following your procedure.  - Do not drive a car or operate machinery for the remainder of the day.  - Do not consume any alcoholic beverages for the remainder of the day.  - Postpone signing any important papers or making any important  decision for the remainder of the day.  - Do not take any muscle relaxants, sedatives, hypnotics, or mood-altering medication today unless ordered by your physician who is aware you are taking the medication today.     If bleeding occurs:  - Apply manual pressure, and immediately seek medical attention at the nearest hospital.   - Seek immediate medical attention if you experience loss of sensation, redness, swelling or discharge from the insertion site.

## 2024-02-21 NOTE — PLAN OF CARE
Bedrest complete. No complications noted. Patient is stable and ready for discharge. Instructions given to patient and family. Questions answered. Patient tolerating po liquids with no difficulty. Patient has no pain or states it is a tolerable level for them. Anesthesia consent and surgical consent in chart.

## 2024-02-22 NOTE — ANESTHESIA POSTPROCEDURE EVALUATION
Anesthesia Post Evaluation    Patient: Mireille Akins    Procedure(s) Performed: Procedure(s) (LRB):  ANGIOGRAM, EXTREMITY, UNILATERAL, PTA , (Right)  STENT, SUPERFICIAL FEMORAL ARTERY (Right)    Final Anesthesia Type: general      Patient location during evaluation: PACU  Patient participation: Yes- Able to Participate  Level of consciousness: awake  Post-procedure vital signs: reviewed and stable  Pain management: adequate  Airway patency: patent    PONV status at discharge: No PONV  Anesthetic complications: no      Cardiovascular status: blood pressure returned to baseline and stable  Respiratory status: unassisted, spontaneous ventilation and room air  Hydration status: euvolemic  Follow-up not needed.              Vitals Value Taken Time   /65 02/21/24 1716   Temp 36.8 °C (98.2 °F) 02/21/24 1715   Pulse 67 02/21/24 1719   Resp 30 02/21/24 1719   SpO2 97 % 02/21/24 1719   Vitals shown include unvalidated device data.      No case tracking events are documented in the log.      Pain/Destiny Score: Pain Rating Prior to Med Admin: 7 (2/21/2024  3:43 PM)  Pain Rating Post Med Admin: 0 (2/21/2024  5:15 PM)  Destiny Score: 9 (2/21/2024  3:15 PM)

## 2024-02-27 ENCOUNTER — TELEPHONE (OUTPATIENT)
Dept: INTERNAL MEDICINE | Facility: CLINIC | Age: 79
End: 2024-02-27
Payer: MEDICARE

## 2024-02-27 NOTE — TELEPHONE ENCOUNTER
Sent voicemail with appt time and date and informed patient that they could call us to reschedule or that they can reschedule through the [facility] portal if the date or time does not work with their schedule.

## 2024-02-27 NOTE — TELEPHONE ENCOUNTER
----- Message from Kayleigh Nieto sent at 2/26/2024  4:38 PM CST -----  Type:  Needs Medical Advice    Who Called: pt  Would the patient rather a call back or a response via [facility]? call  Best Call Back Number:  [phone]  Additional Information: pt states she will be out of town for appt on 4/18 and 4/15 would like to reschedule to another day         Rituxan Counseling:  I discussed with the patient the risks of Rituxan infusions. Side effects can include infusion reactions, severe drug rashes including mucocutaneous reactions, reactivation of latent hepatitis and other infections and rarely progressive multifocal leukoencephalopathy.  All of the patient's questions and concerns were addressed.

## 2024-02-28 ENCOUNTER — TELEPHONE (OUTPATIENT)
Dept: VASCULAR SURGERY | Facility: CLINIC | Age: 79
End: 2024-02-28
Payer: MEDICARE

## 2024-02-28 DIAGNOSIS — I73.9 PAD (PERIPHERAL ARTERY DISEASE): Primary | ICD-10-CM

## 2024-02-28 NOTE — TELEPHONE ENCOUNTER
"Attempted to contact patient in response to message. Voice message left for patient requesting return call.   ----- Message from Sonia Ellis sent at 2/28/2024 12:13 PM CST -----  Regarding: pt advice  Contact: [phone]  Name Of Caller: self     Contact Preference?:  [phone]     What is the nature of the call?: pt would like to know if she would need to do a f/u appt and would like to schedule if so. She states that she have reached out and haven't heard from anyone yet     Additional Notes:    "Thank you for all that you do for our patients"        "

## 2024-02-29 ENCOUNTER — TELEPHONE (OUTPATIENT)
Dept: VASCULAR SURGERY | Facility: CLINIC | Age: 79
End: 2024-02-29
Payer: MEDICARE

## 2024-02-29 DIAGNOSIS — I73.9 PAD (PERIPHERAL ARTERY DISEASE): Primary | ICD-10-CM

## 2024-02-29 NOTE — TELEPHONE ENCOUNTER
----- Message from Deloris Dodson sent at 2/29/2024  9:03 AM CST -----  Regarding: call back  Contact: [phone]  Pt returning call please call to discuss Further

## 2024-03-05 ENCOUNTER — TELEPHONE (OUTPATIENT)
Dept: INTERNAL MEDICINE | Facility: CLINIC | Age: 79
End: 2024-03-05
Payer: MEDICARE

## 2024-03-05 ENCOUNTER — HOSPITAL ENCOUNTER (OUTPATIENT)
Dept: CARDIOLOGY | Facility: HOSPITAL | Age: 79
Discharge: HOME | End: 2024-03-05
Attending: SURGERY
Payer: MEDICARE

## 2024-03-05 ENCOUNTER — TELEPHONE (OUTPATIENT)
Dept: PODIATRY | Facility: CLINIC | Age: 79
End: 2024-03-05
Payer: MEDICARE

## 2024-03-05 DIAGNOSIS — I73.9 PAD (PERIPHERAL ARTERY DISEASE): ICD-10-CM

## 2024-03-05 PROCEDURE — 93926 LOWER EXTREMITY STUDY: CPT | Mod: LT

## 2024-03-05 PROCEDURE — 93922 UPR/L XTREMITY ART 2 LEVELS: CPT | Mod: 26,,, | Performed by: INTERNAL MEDICINE

## 2024-03-05 PROCEDURE — 93926 LOWER EXTREMITY STUDY: CPT | Mod: 26,LT,, | Performed by: INTERNAL MEDICINE

## 2024-03-05 PROCEDURE — 93922 UPR/L XTREMITY ART 2 LEVELS: CPT

## 2024-03-05 NOTE — TELEPHONE ENCOUNTER
----- Message from Corinna Rice sent at 3/5/2024 11:37 AM CST -----  Regarding: Appt  Contact: pt @ [phone]  Pt is calling to get appt changed to an earlier time or later time 3/14. Asking for a call back

## 2024-03-05 NOTE — TELEPHONE ENCOUNTER
----- Message from Clifford Madera sent at 3/5/2024 11:45 AM CST -----  Pt Requesting Call Back    Who called: pt  Who called for pt:  Best call back #: [phone]  Add notes: pt needs clarity on why she is scheduled for lab work on 4/22/24 and 5/13/24

## 2024-03-05 NOTE — TELEPHONE ENCOUNTER
Spoke to patient, appointment moved up to 0830. Patient denies any further questions at this time.

## 2024-03-06 LAB
LEFT ABI: 0.9
LEFT ARM BP: 131 MMHG
LEFT DORSALIS PEDIS: 124 MMHG
LEFT POSTERIOR TIBIAL: 126 MMHG
RIGHT ABI: 0.91
RIGHT ARM BP: 140 MMHG
RIGHT DORSALIS PEDIS: 100 MMHG
RIGHT POSTERIOR TIBIAL: 128 MMHG

## 2024-03-08 LAB
LEFT ANT TIBIAL SYS PSV: 52 CM/S
LEFT CFA PSV: 84 CM/S
LEFT DORSALIS PEDIS PSV: 65 CM/S
LEFT PERONEAL SYS PSV: 12 CM/S
LEFT POPLITEAL PSV: 83 CM/S
LEFT POST TIBIAL SYS PSV: 23 CM/S
LEFT PROFUNDA SYS PSV: 123 CM/S
LEFT SUPER FEMORAL DIST SYS PSV: 202 CM/S
LEFT SUPER FEMORAL MID SYS PSV: 176 CM/S
LEFT SUPER FEMORAL PROX SYS PSV: 87 CM/S
LEFT TIB/PER TRUNK SYS PSV: 67 CM/S

## 2024-03-13 ENCOUNTER — OFFICE VISIT (OUTPATIENT)
Dept: VASCULAR SURGERY | Facility: CLINIC | Age: 79
End: 2024-03-13
Payer: MEDICARE

## 2024-03-13 VITALS — DIASTOLIC BLOOD PRESSURE: 85 MMHG | SYSTOLIC BLOOD PRESSURE: 160 MMHG | HEART RATE: 70 BPM

## 2024-03-13 DIAGNOSIS — I73.9 PAD (PERIPHERAL ARTERY DISEASE): Primary | ICD-10-CM

## 2024-03-13 PROCEDURE — 99999 PR PBB SHADOW E&M-EST. PATIENT-LVL II: CPT | Mod: PBBFAC,,, | Performed by: SURGERY

## 2024-03-13 PROCEDURE — 99212 OFFICE O/P EST SF 10 MIN: CPT | Mod: S$GLB,,, | Performed by: SURGERY

## 2024-03-13 NOTE — PROGRESS NOTES
Patient is status post stenting the right superficial femoral artery.  She is doing well now.  Her ABIs are 0.9 bilaterally.  I can feel a pulse on the left foot I can not feel a good pulse on the right foot.  But she is got good Doppler flow.  ABIs were reviewed and they are 0.9 and 0.91.  At this point she is doing well.  The only problem she has a small ingrown toenail on the right great toe.  She was going to faint by Podiatry later this week.  Hopefully they will take care of that.    Overall she is doing well.  She walked about 8-10 blocks earlier in this week without any difficulty.    We will have her return to clinic in about 3 months with a duplex scan of her right SFA.  All of her questions were answered a pleasure to see this lady hope she enjoys her vacation to the [facility] more

## 2024-03-14 ENCOUNTER — OFFICE VISIT (OUTPATIENT)
Dept: PODIATRY | Facility: CLINIC | Age: 79
End: 2024-03-14
Payer: MEDICARE

## 2024-03-14 VITALS
BODY MASS INDEX: 26.97 KG/M2 | SYSTOLIC BLOOD PRESSURE: 140 MMHG | TEMPERATURE: 99 F | HEIGHT: 68 IN | WEIGHT: 177.94 LBS | HEART RATE: 76 BPM | DIASTOLIC BLOOD PRESSURE: 70 MMHG

## 2024-03-14 DIAGNOSIS — I73.9 PERIPHERAL ARTERIAL DISEASE: Primary | ICD-10-CM

## 2024-03-14 DIAGNOSIS — Z87.2 HISTORY OF SKIN ULCER: ICD-10-CM

## 2024-03-14 PROCEDURE — 99999 PR PBB SHADOW E&M-EST. PATIENT-LVL III: CPT | Mod: PBBFAC,,, | Performed by: STUDENT IN AN ORGANIZED HEALTH CARE EDUCATION/TRAINING PROGRAM

## 2024-03-14 PROCEDURE — 99213 OFFICE O/P EST LOW 20 MIN: CPT | Mod: S$GLB,,, | Performed by: STUDENT IN AN ORGANIZED HEALTH CARE EDUCATION/TRAINING PROGRAM

## 2024-03-14 NOTE — PROGRESS NOTES
Subjective:     Patient    Mireille Akins is a 78 y.o. female.    Problem    02/15/24: New to [facility]. Previously followed by Dr Galvan, Dr Rea, and Dr Schmitz for her feet; prior right foot reconstruction with hardware still intact. Having pain at right 2nd toe which Dr Schmitz believes is related to 2nd toe screw. She is also having pain within the right heel starting around October which responded to steroid injections but then returned and is now constant and progressive, limits her activity. She had X rays and arterial US; arterial US confirmed severe PAD. She was seen by Dr Murry who recommended revascularization but the procedure has not yet been scheduled. She also recently saw Dr Schmitz who she says picked at the heel and the heel has not healed since.     24: Has had revascularization by vascular surgery, doing well per Dr Murry. Right heel pain resolved, skin lesion cleared. Still with duskiness throughout the right foot, slowly resolving.     History    History obtained from patient and review of medical records.     Past Medical History:   Diagnosis Date    Angiomyolipoma 2022    Asthma     last attack 15 years ago    PONV (postoperative nausea and vomiting)     Subclinical hypothyroidism        Past Surgical History:   Procedure Laterality Date    ANGIOGRAM, EXTREMITY, UNILATERAL Right 2024    Procedure: ANGIOGRAM, EXTREMITY, UNILATERAL, PTA ,;  Surgeon: Teo Murry MD;  Location: [facility] OR [address] [place] [address];  Service: Vascular;  Laterality: Right;  8.5 min  355.30 mGy  73.2661 Gy.cm  96ml Dye    BACK SURGERY       SECTION      CHOLECYSTECTOMY      HYSTERECTOMY      knee scoped      rotator cuff surgery Right     STENT, SUPERFICIAL FEMORAL ARTERY Right 2024    Procedure: STENT, SUPERFICIAL FEMORAL ARTERY;  Surgeon: Teo Murry MD;  Location: [facility] OR [address] [place] [address];  Service: Vascular;  Laterality: Right;    TONSILLECTOMY          Objective:      Vitals  Wt Readings from Last 1 Encounters:   24 80.7 kg (177 lb 14.6 oz)     Temp Readings from Last 1 Encounters:   24 98.9 °F (37.2 °C) (Oral)     BP Readings from Last 1 Encounters:   24 (!) 140/70     Pulse Readings from Last 1 Encounters:   24 76       Dermatological Exam    Skin:  Pedal hair growth diminished and Pedal skin thin and shiny and mottled and dusky on right, slowly resolving; scabs on medial heel now resolved      Nails:  All nails thickened    Vascular Exam    Arteries:  Posterior tibial artery monophasic on doppler on right  Dorsalis pedis artery monophasic on doppler on right     Veins:  Telangectasia on right     Swellin+ nonpitting on right     Neurological Exam    [place] touch test:  Right foot protective sensation intact     Musculoskeletal Exam    Footwear:  Athletic on right  Athletic on left    Gait Exam:   Ambulatory Status: Ambulatory  Gait: Antalgic  Assistive Devices: None    Foot Progression Angle:  Normal on right  Normal on left    Right Lower Extremity Additional Findings:  Tenderness resolved   Right foot and ankle function, strength, and range of motion unremarkable except as noted above.      Imaging and Other Tests    Imagin24 right foot X rays: s/p surgeries with hardware intact at cuneiforms, 1st proximal phalanx, 2nd toe.     24 arterial US: R APSV 21.5 cm/s monophasic, below healing threshold    Independently reviewed and interpreted imaging, findings are as follows: N/A     Assessment:     Encounter Diagnoses   Name Primary?    Peripheral arterial disease Yes    History of skin ulcer           Plan:     I counseled the patient on her conditions, their implications and medical management.    Peripheral arterial disease, history of ulcer: chronic improved  -Now s/p revascularization by vascular surgery with pain and skin breakdown resolved, color improving.  -Treat as normal skin.    -Physical activity as tolerated.           Return to clinic in 3 months for routine foot care, call sooner PRN.

## 2024-03-21 ENCOUNTER — TELEPHONE (OUTPATIENT)
Dept: VASCULAR SURGERY | Facility: CLINIC | Age: 79
End: 2024-03-21
Payer: MEDICARE

## 2024-03-21 ENCOUNTER — PATIENT MESSAGE (OUTPATIENT)
Dept: VASCULAR SURGERY | Facility: CLINIC | Age: 79
End: 2024-03-21
Payer: MEDICARE

## 2024-03-21 NOTE — TELEPHONE ENCOUNTER
Contacted pt in response to message. States approx 1 week ago she began noticing red splotches on her arms and legs that she states are similar to a bruise but are not purple like a bruise and states she has not injured herself. Denies splotches being raised or itching. Pt states she is taking Plavix 75 mg and ASA 81 mg daily and began taking these medication in early February. Pt denies taking any other anticoagulants or antiplatelets and has not begun taking any other new medications. Nurse notified pt that this will be discussed with Dr. VICTORINO Hernandez or with on-call surgeon Dr. Lord as Dr. Murry will not return to clinic until next week. Pt verbalized understanding.  ----- Message from Jessica Shearer sent at 3/21/2024  9:58 AM CDT -----  Regarding: question  Contact: @ [phone]  Pt called in regards to speaking with someone in the office she has broken out with read spot all over her body and she was told it was because she is taking a too high dose of .clopidogreL (PLAVIX) 75 mg tablet....Please call and adv @ [phone]

## 2024-03-21 NOTE — TELEPHONE ENCOUNTER
Contacted pt with Dr. Hernandez's response that patient should send picture of red splotches on arms and legs via [facility] so that this can be properly assessed. Pt verbalized understanding and states she will do this when she gets home. Will await pt's message.

## 2024-03-25 ENCOUNTER — TELEPHONE (OUTPATIENT)
Dept: INTERNAL MEDICINE | Facility: CLINIC | Age: 79
End: 2024-03-25
Payer: MEDICARE

## 2024-03-25 NOTE — TELEPHONE ENCOUNTER
"----- Message from Clifford Madera sent at 3/25/2024  2:20 PM CDT -----  Pt Note to Doctor    Who called: pt  Best call back #: [phone]  Note: pt would like to have her 4/25/24 appt at a later time; pt said she would like "any time except 8am" (I searched for an appt for pt but none was available for a later time)    "

## 2024-04-22 ENCOUNTER — LAB VISIT (OUTPATIENT)
Dept: LAB | Facility: HOSPITAL | Age: 79
End: 2024-04-22
Attending: INTERNAL MEDICINE
Payer: MEDICARE

## 2024-04-22 DIAGNOSIS — E03.8 SUBCLINICAL HYPOTHYROIDISM: ICD-10-CM

## 2024-04-22 DIAGNOSIS — N18.31 STAGE 3A CHRONIC KIDNEY DISEASE: ICD-10-CM

## 2024-04-22 DIAGNOSIS — E66.3 OVERWEIGHT WITH BODY MASS INDEX (BMI) OF 28 TO 28.9 IN ADULT: ICD-10-CM

## 2024-04-22 LAB
ALBUMIN SERPL BCP-MCNC: 3.3 G/DL (ref 3.5–5.2)
ALP SERPL-CCNC: 86 U/L (ref 55–135)
ALT SERPL W/O P-5'-P-CCNC: 26 U/L (ref 10–44)
ANION GAP SERPL CALC-SCNC: 7 MMOL/L (ref 8–16)
AST SERPL-CCNC: 26 U/L (ref 10–40)
BASOPHILS # BLD AUTO: 0.05 K/UL (ref 0–0.2)
BASOPHILS NFR BLD: 0.9 % (ref 0–1.9)
BILIRUB SERPL-MCNC: 0.4 MG/DL (ref 0.1–1)
BUN SERPL-MCNC: 14 MG/DL (ref 8–23)
CALCIUM SERPL-MCNC: 9.2 MG/DL (ref 8.7–10.5)
CHLORIDE SERPL-SCNC: 103 MMOL/L (ref 95–110)
CHOLEST SERPL-MCNC: 189 MG/DL (ref 120–199)
CHOLEST/HDLC SERPL: 2.3 {RATIO} (ref 2–5)
CO2 SERPL-SCNC: 26 MMOL/L (ref 23–29)
CREAT SERPL-MCNC: 0.8 MG/DL (ref 0.5–1.4)
DIFFERENTIAL METHOD BLD: ABNORMAL
EOSINOPHIL # BLD AUTO: 0.1 K/UL (ref 0–0.5)
EOSINOPHIL NFR BLD: 2.3 % (ref 0–8)
ERYTHROCYTE [DISTWIDTH] IN BLOOD BY AUTOMATED COUNT: 13.2 % (ref 11.5–14.5)
EST. GFR  (NO RACE VARIABLE): >60 ML/MIN/1.73 M^2
GLUCOSE SERPL-MCNC: 112 MG/DL (ref 70–110)
HCT VFR BLD AUTO: 40.4 % (ref 37–48.5)
HDLC SERPL-MCNC: 82 MG/DL (ref 40–75)
HDLC SERPL: 43.4 % (ref 20–50)
HGB BLD-MCNC: 13.4 G/DL (ref 12–16)
IMM GRANULOCYTES # BLD AUTO: 0.01 K/UL (ref 0–0.04)
IMM GRANULOCYTES NFR BLD AUTO: 0.2 % (ref 0–0.5)
LDLC SERPL CALC-MCNC: 90.6 MG/DL (ref 63–159)
LYMPHOCYTES # BLD AUTO: 1.9 K/UL (ref 1–4.8)
LYMPHOCYTES NFR BLD: 36.4 % (ref 18–48)
MCH RBC QN AUTO: 31.5 PG (ref 27–31)
MCHC RBC AUTO-ENTMCNC: 33.2 G/DL (ref 32–36)
MCV RBC AUTO: 95 FL (ref 82–98)
MONOCYTES # BLD AUTO: 0.5 K/UL (ref 0.3–1)
MONOCYTES NFR BLD: 9.1 % (ref 4–15)
NEUTROPHILS # BLD AUTO: 2.7 K/UL (ref 1.8–7.7)
NEUTROPHILS NFR BLD: 51.1 % (ref 38–73)
NONHDLC SERPL-MCNC: 107 MG/DL
NRBC BLD-RTO: 0 /100 WBC
PLATELET # BLD AUTO: 243 K/UL (ref 150–450)
PMV BLD AUTO: 10.5 FL (ref 9.2–12.9)
POTASSIUM SERPL-SCNC: 4.7 MMOL/L (ref 3.5–5.1)
PROT SERPL-MCNC: 6.5 G/DL (ref 6–8.4)
RBC # BLD AUTO: 4.26 M/UL (ref 4–5.4)
SODIUM SERPL-SCNC: 136 MMOL/L (ref 136–145)
TRIGL SERPL-MCNC: 82 MG/DL (ref 30–150)
TSH SERPL DL<=0.005 MIU/L-ACNC: 3.52 UIU/ML (ref 0.4–4)
WBC # BLD AUTO: 5.28 K/UL (ref 3.9–12.7)

## 2024-04-22 PROCEDURE — 85025 COMPLETE CBC W/AUTO DIFF WBC: CPT | Performed by: INTERNAL MEDICINE

## 2024-04-22 PROCEDURE — 36415 COLL VENOUS BLD VENIPUNCTURE: CPT | Mod: PO | Performed by: INTERNAL MEDICINE

## 2024-04-22 PROCEDURE — 80061 LIPID PANEL: CPT | Performed by: INTERNAL MEDICINE

## 2024-04-22 PROCEDURE — 80053 COMPREHEN METABOLIC PANEL: CPT | Performed by: INTERNAL MEDICINE

## 2024-04-22 PROCEDURE — 84443 ASSAY THYROID STIM HORMONE: CPT | Performed by: INTERNAL MEDICINE

## 2024-04-25 ENCOUNTER — OFFICE VISIT (OUTPATIENT)
Dept: INTERNAL MEDICINE | Facility: CLINIC | Age: 79
End: 2024-04-25
Payer: MEDICARE

## 2024-04-25 VITALS
BODY MASS INDEX: 26.33 KG/M2 | WEIGHT: 173.75 LBS | HEART RATE: 77 BPM | DIASTOLIC BLOOD PRESSURE: 74 MMHG | OXYGEN SATURATION: 99 % | SYSTOLIC BLOOD PRESSURE: 142 MMHG | HEIGHT: 68 IN

## 2024-04-25 DIAGNOSIS — E03.8 SUBCLINICAL HYPOTHYROIDISM: ICD-10-CM

## 2024-04-25 DIAGNOSIS — I73.9 PERIPHERAL ARTERIAL DISEASE WITH HISTORY OF REVASCULARIZATION: ICD-10-CM

## 2024-04-25 DIAGNOSIS — Z98.890 PERIPHERAL ARTERIAL DISEASE WITH HISTORY OF REVASCULARIZATION: ICD-10-CM

## 2024-04-25 DIAGNOSIS — Z79.890 HORMONE REPLACEMENT THERAPY (HRT): ICD-10-CM

## 2024-04-25 PROCEDURE — 1126F AMNT PAIN NOTED NONE PRSNT: CPT | Mod: CPTII,S$GLB,, | Performed by: INTERNAL MEDICINE

## 2024-04-25 PROCEDURE — 1159F MED LIST DOCD IN RCRD: CPT | Mod: CPTII,S$GLB,, | Performed by: INTERNAL MEDICINE

## 2024-04-25 PROCEDURE — 99999 PR PBB SHADOW E&M-EST. PATIENT-LVL III: CPT | Mod: PBBFAC,,, | Performed by: INTERNAL MEDICINE

## 2024-04-25 PROCEDURE — 99214 OFFICE O/P EST MOD 30 MIN: CPT | Mod: S$GLB,,, | Performed by: INTERNAL MEDICINE

## 2024-04-25 PROCEDURE — 3078F DIAST BP <80 MM HG: CPT | Mod: CPTII,S$GLB,, | Performed by: INTERNAL MEDICINE

## 2024-04-25 PROCEDURE — 1101F PT FALLS ASSESS-DOCD LE1/YR: CPT | Mod: CPTII,S$GLB,, | Performed by: INTERNAL MEDICINE

## 2024-04-25 PROCEDURE — 3077F SYST BP >= 140 MM HG: CPT | Mod: CPTII,S$GLB,, | Performed by: INTERNAL MEDICINE

## 2024-04-25 PROCEDURE — 3288F FALL RISK ASSESSMENT DOCD: CPT | Mod: CPTII,S$GLB,, | Performed by: INTERNAL MEDICINE

## 2024-04-25 PROCEDURE — 1160F RVW MEDS BY RX/DR IN RCRD: CPT | Mod: CPTII,S$GLB,, | Performed by: INTERNAL MEDICINE

## 2024-04-25 NOTE — PROGRESS NOTES
Patient ID: Mireille Akins is a 78 y.o. female.    Chief Complaint: Annual Exam    HPI Mireille is a 78 y.o. female with  angiomyolipoma, subclinical hypothyroidism, peripheral arterial disease (s/p revascularization procedure) and history of kidney stone who presents for annual exam.      Patient is doing well since her revascularization procedure by Dr. Murry.  She is no longer having any pain in her lower extremity. She will establish care with cardiology soon (May 1st)    Reviewed lab results from 04/22/2024.      Her only new complaint today is that of easy bruising.  She is on aspirin and Plavix.  Per chart review, vascular surgery wants her to be on dual antiplatelet therapy for at least 3 months.  No further acute complaints or concerns.    Health Maintenance Topics with due status: Not Due       Topic Last Completion Date    DEXA Scan 11/11/2021    TETANUS VACCINE 11/26/2021    Lipid Panel 04/22/2024          Review of Systems   Skin:         Easy bruising (on DAPT)   All other systems reviewed and are negative.     Objective:     Vitals:    04/25/24 0815   BP: (!) 142/74   Pulse: 77        Physical Exam  Vitals reviewed.   Constitutional:       General: She is not in acute distress.     Appearance: Normal appearance. She is well-developed. She is not ill-appearing, toxic-appearing or diaphoretic.   HENT:      Head: Normocephalic and atraumatic.      Right Ear: Tympanic membrane, ear canal and external ear normal. There is no impacted cerumen.      Left Ear: Tympanic membrane, ear canal and external ear normal. There is no impacted cerumen.      Nose: Nose normal.   Eyes:      General: No scleral icterus.        Right eye: No discharge.         Left eye: No discharge.      Extraocular Movements: Extraocular movements intact.      Conjunctiva/sclera: Conjunctivae normal.   Neck:      Thyroid: No thyromegaly.      Trachea: No tracheal deviation.   Cardiovascular:      Rate and Rhythm: Normal rate  and regular rhythm.      Pulses: Normal pulses.           Dorsalis pedis pulses are 2+ on the right side and 2+ on the left side.      Heart sounds: Normal heart sounds. No murmur heard.     No friction rub. No gallop.   Pulmonary:      Effort: Pulmonary effort is normal. No respiratory distress.      Breath sounds: Normal breath sounds. No stridor. No wheezing, rhonchi or rales.   Chest:      Chest wall: No tenderness.   Abdominal:      General: Bowel sounds are normal. There is no distension.      Palpations: Abdomen is soft. There is no mass.      Tenderness: There is no abdominal tenderness. There is no guarding or rebound.      Hernia: No hernia is present.   Musculoskeletal:         General: No tenderness or deformity.      Cervical back: Neck supple.      Right lower leg: No edema.      Left lower leg: No edema.   Lymphadenopathy:      Cervical: No cervical adenopathy.   Skin:     General: Skin is warm and dry.      Findings: No erythema or rash.   Neurological:      General: No focal deficit present.      Mental Status: She is alert and oriented to person, place, and time. Mental status is at baseline.   Psychiatric:         Mood and Affect: Mood normal.         Behavior: Behavior normal.         Thought Content: Thought content normal.         Judgment: Judgment normal.         Assessment:       1. Peripheral arterial disease with history of revascularization Chronic   2. Subclinical hypothyroidism Chronic   3. Hormone replacement therapy (HRT) Chronic       Plan:         Peripheral arterial disease with history of revascularization  Comments:  contniue DAPT and statin. Continue to follow with vascular surgery. Establish care with cardiology    Subclinical hypothyroidism  Comments:  continue to monitor TSH yearly    Hormone replacement therapy (HRT)  Comments:  continue to follow with OB-GYN        RTC 3-4 months, establish care     Warning signs discussed, patient to call with any further issues or  worsening of symptoms.       Parts of the above note were dictated using a voice dictation software. Please excuse any grammatical or typographical errors.

## 2024-05-01 ENCOUNTER — OFFICE VISIT (OUTPATIENT)
Dept: CARDIOLOGY | Facility: CLINIC | Age: 79
End: 2024-05-01
Payer: MEDICARE

## 2024-05-01 VITALS
SYSTOLIC BLOOD PRESSURE: 150 MMHG | BODY MASS INDEX: 27.48 KG/M2 | DIASTOLIC BLOOD PRESSURE: 82 MMHG | HEIGHT: 67 IN | WEIGHT: 175.06 LBS | HEART RATE: 78 BPM

## 2024-05-01 DIAGNOSIS — E78.2 MIXED HYPERLIPIDEMIA: ICD-10-CM

## 2024-05-01 DIAGNOSIS — Z98.890 PERIPHERAL ARTERIAL DISEASE WITH HISTORY OF REVASCULARIZATION: Primary | ICD-10-CM

## 2024-05-01 DIAGNOSIS — I73.9 PERIPHERAL ARTERIAL DISEASE WITH HISTORY OF REVASCULARIZATION: Primary | ICD-10-CM

## 2024-05-01 DIAGNOSIS — E66.3 OVERWEIGHT (BMI 25.0-29.9): ICD-10-CM

## 2024-05-01 PROCEDURE — 99999 PR PBB SHADOW E&M-EST. PATIENT-LVL III: CPT | Mod: PBBFAC,,, | Performed by: INTERNAL MEDICINE

## 2024-05-01 PROCEDURE — 3079F DIAST BP 80-89 MM HG: CPT | Mod: CPTII,S$GLB,, | Performed by: INTERNAL MEDICINE

## 2024-05-01 PROCEDURE — 99205 OFFICE O/P NEW HI 60 MIN: CPT | Mod: S$GLB,,, | Performed by: INTERNAL MEDICINE

## 2024-05-01 PROCEDURE — 3077F SYST BP >= 140 MM HG: CPT | Mod: CPTII,S$GLB,, | Performed by: INTERNAL MEDICINE

## 2024-05-01 PROCEDURE — 1126F AMNT PAIN NOTED NONE PRSNT: CPT | Mod: CPTII,S$GLB,, | Performed by: INTERNAL MEDICINE

## 2024-05-01 PROCEDURE — 1159F MED LIST DOCD IN RCRD: CPT | Mod: CPTII,S$GLB,, | Performed by: INTERNAL MEDICINE

## 2024-05-01 PROCEDURE — 3288F FALL RISK ASSESSMENT DOCD: CPT | Mod: CPTII,S$GLB,, | Performed by: INTERNAL MEDICINE

## 2024-05-01 PROCEDURE — 1101F PT FALLS ASSESS-DOCD LE1/YR: CPT | Mod: CPTII,S$GLB,, | Performed by: INTERNAL MEDICINE

## 2024-05-01 NOTE — PATIENT INSTRUCTIONS
Assessment/Plan:  Mireille Akins is a 78 y.o. female with PAD s/p right SFA PTA/stent placement, overweight, who presents for an initial appointment.    PAD s/p right SFA PTA/stent placement- Mrs. Akins reports no claudication symptoms or tissue loss.  She reports significant bruising of arms and legs since starting ASA and plavix.  She reports no rash. Pt to start walking program with goal of at least 30 minutes a day 5 days per week.  Continue ASA/Plavix and atorvastatin 40 mg daily.  Check updated lipids with goal LDL of <70.  Follow up with Dr. Murry as scheduled.      2. Overweight- Encourage diet, exercise, and weight loss.    Follow up in 6 months

## 2024-05-01 NOTE — PROGRESS NOTES
[facility]      Chief Complaint   Patient presents with    Peripheral Vascular Disease       Patient ID: Mireille Akins is a 78 y.o. female with PAD s/p right SFA PTA/stent placement, overweight, who presents for an initial appointment. Pertinent history/events are as follows:     -Pt kindly referred by Dr. Cedeno for evaluation of Peripheral arterial disease.    HPI:  Mrs. Akins reports no claudication symptoms or tissue loss.  She reports significant bruising of arms and legs since starting ASA and plavix.  She reports no rash.      Past Medical History:   Diagnosis Date    Angiomyolipoma 2022    Asthma     last attack 15 years ago    PONV (postoperative nausea and vomiting)     Subclinical hypothyroidism      Past Surgical History:   Procedure Laterality Date    ANGIOGRAM, EXTREMITY, UNILATERAL Right 2024    Procedure: ANGIOGRAM, EXTREMITY, UNILATERAL, PTA ,;  Surgeon: Teo Murry MD;  Location: [facility] OR [address] [place] [address];  Service: Vascular;  Laterality: Right;  8.5 min  355.30 mGy  73.2661 Gy.cm  96ml Dye    BACK SURGERY       SECTION      CHOLECYSTECTOMY      HYSTERECTOMY      knee scoped      rotator cuff surgery Right     STENT, SUPERFICIAL FEMORAL ARTERY Right 2024    Procedure: STENT, SUPERFICIAL FEMORAL ARTERY;  Surgeon: Teo Murry MD;  Location: [facility] OR [address] [place] [address];  Service: Vascular;  Laterality: Right;    TONSILLECTOMY       Social History     Socioeconomic History    Marital status:    Tobacco Use    Smoking status: Former     Types: Cigarettes     Start date: 1965     Passive exposure: Past    Smokeless tobacco: Never    Tobacco comments:     quit at age 20   Substance and Sexual Activity    Alcohol use: Not Currently    Drug use: No    Sexual activity: Yes     Partners: Male     Birth control/protection: See Surgical Hx     Social Determinants of Health     Financial Resource Strain: Low Risk  (2022)    Overall Financial Resource  Strain (CARDIA)     Difficulty of Paying Living Expenses: Not hard at all   Food Insecurity: No Food Insecurity (9/6/2022)    Hunger Vital Sign     Worried About Running Out of Food in the Last Year: Never true     Ran Out of Food in the Last Year: Never true   Transportation Needs: No Transportation Needs (9/6/2022)    PRAPARE - Transportation     Lack of Transportation (Medical): No     Lack of Transportation (Non-Medical): No   Physical Activity: Sufficiently Active (8/10/2020)    Exercise Vital Sign     Days of Exercise per Week: 3 days     Minutes of Exercise per Session: 60 min   Stress: Stress Concern Present (9/6/2022)    [employer] - Occupational Stress Questionnaire     Feeling of Stress : Very much   Housing Stability: Unknown (9/6/2022)    Housing Stability Vital Sign     Unable to Pay for Housing in the Last Year: No     Unstable Housing in the Last Year: No     Family History   Problem Relation Name Age of Onset    Heart disease Mother      Prostate cancer Neg Hx      Kidney disease Neg Hx      Breast cancer Neg Hx      Colon cancer Neg Hx      Ovarian cancer Neg Hx         Review of patient's allergies indicates:  No Known Allergies    Medication List with Changes/Refills   Current Medications    ASPIRIN (ECOTRIN) 81 MG EC TABLET    Take 1 tablet (81 mg total) by mouth once daily.    ATORVASTATIN (LIPITOR) 40 MG TABLET    Take 1 tablet (40 mg total) by mouth once daily.    CLOPIDOGREL (PLAVIX) 75 MG TABLET    Take 1 tablet (75 mg total) by mouth once daily.    ESTRADIOL (ESTRACE) 2 MG TABLET    Take 1 tablet (2 mg total) by mouth once daily.    MULTIVITAMIN W-MINERALS/LUTEIN (CENTRUM SILVER ORAL)    Take 1 tablet by mouth once daily.    PANTOPRAZOLE (PROTONIX) 40 MG TABLET    TAKE ONE TABLET BY MOUTH EVERY DAY       Review of Systems  Constitution: Denies chills, fever, and sweats.  HENT: Denies headaches or blurry vision.  Cardiovascular: Denies chest pain or irregular  "heart beat.  Respiratory: Denies cough or shortness of breath.  Gastrointestinal: Denies abdominal pain, nausea, or vomiting.  Musculoskeletal: Denies muscle cramps.  Neurological: Denies dizziness or focal weakness.  Psychiatric/Behavioral: Normal mental status.  Hematologic/Lymphatic: Denies bleeding problem or easy bruising/bleeding.  Skin: Denies rash or suspicious lesions    Physical Examination  BP (!) 150/82   Pulse 78   Ht 5' 7" (1.702 m)   Wt 79.4 kg (175 lb 0.7 oz)   LMP  (LMP Unknown)   BMI 27.42 kg/m²     Constitutional: No acute distress, conversant  HEENT: Sclera anicteric, Pupils equal, round and reactive to light, extraocular motions intact, Oropharynx clear  Neck: No JVD, no carotid bruits  Cardiovascular: regular rate and rhythm, no murmur, rubs or gallops, normal S1/S2  Pulmonary: Clear to auscultation bilaterally  Abdominal: Abdomen soft, nontender, nondistended, positive bowel sounds  Extremities: No lower extremity edema,   Pulses:  Carotid pulses are 2+ on the right side, and 2+ on the left side.  Radial pulses are 2+ on the right side, and 2+ on the left side.   Femoral pulses are 2+ on the right side, and 2+ on the left side.  Popliteal pulses are 2+ on the right side, and 2+ on the left side.   Dorsalis pedis pulses are 2+ on the right side, and 2+ on the left side.   Posterior tibial pulses are 2+ on the right side, and 2+ on the left side.    Skin: No ecchymosis, erythema, or ulcers  Psych: Alert and oriented x 3, appropriate affect  Neuro: CNII-XII intact, no focal deficits    Labs:  Most Recent Data  CBC:   Lab Results   Component Value Date    WBC 5.28 04/22/2024    HGB 13.4 04/22/2024    HCT 40.4 04/22/2024     04/22/2024    MCV 95 04/22/2024    RDW 13.2 04/22/2024     BMP:   Lab Results   Component Value Date     04/22/2024    K 4.7 04/22/2024     04/22/2024    CO2 26 04/22/2024    BUN 14 04/22/2024    CREATININE 0.8 04/22/2024     (H) 04/22/2024    " CALCIUM 9.2 04/22/2024    MG 1.6 08/07/2014     LFTS;   Lab Results   Component Value Date    PROT 6.5 04/22/2024    ALBUMIN 3.3 (L) 04/22/2024    BILITOT 0.4 04/22/2024    AST 26 04/22/2024    ALKPHOS 86 04/22/2024    ALT 26 04/22/2024     COAGS:   Lab Results   Component Value Date    INR 0.9 05/29/2023     FLP:   Lab Results   Component Value Date    CHOL 189 04/22/2024    HDL 82 (H) 04/22/2024    LDLCALC 90.6 04/22/2024    TRIG 82 04/22/2024    CHOLHDL 43.4 04/22/2024     CARDIAC:   Lab Results   Component Value Date    TROPONINI 0.006 07/09/2014       Imaging:    JOHNSON Study 3/5/2024:    Right JOHNSON 0.91    Left JOHNSON 0.90    LLE Arterial Ultrasound 3/5/2024:    Diffuse arthrosclerosis    Borderline doubling velocity in the mid left SFA suggest possible 50% stenosis    Occluded left peroneal    Severe damping velocity left posterior tibial artery suggest significant stenosis     Assessment/Plan:  Mireille Akins is a 78 y.o. female with PAD s/p right SFA PTA/stent placement, overweight, who presents for an initial appointment.    PAD s/p right SFA PTA/stent placement- Mrs. Akins reports no claudication symptoms or tissue loss.  She reports significant bruising of arms and legs since starting ASA and plavix.  She reports no rash. Pt to start walking program with goal of at least 30 minutes a day 5 days per week.  Continue ASA/Plavix and atorvastatin 40 mg daily.  Check updated lipids with goal LDL of <70.  Follow up with Dr. Murry as scheduled.      2. Overweight- Encourage diet, exercise, and weight loss.    Follow up in 6 months    Total duration of face to face visit time 30 minutes.  Total time spent counseling greater than fifty percent of total visit time.  Counseling included discussion regarding imaging findings, diagnosis, possibilities, treatment options, risks and benefits.  The patient had many questions regarding the options and long-term effects.    Fernando Mayorga MD,  PhD  Interventional Cardiology

## 2024-06-13 ENCOUNTER — HOSPITAL ENCOUNTER (OUTPATIENT)
Dept: RADIOLOGY | Facility: HOSPITAL | Age: 79
Discharge: HOME | End: 2024-06-13
Attending: SURGERY
Payer: MEDICARE

## 2024-06-13 DIAGNOSIS — I73.9 PAD (PERIPHERAL ARTERY DISEASE): Primary | ICD-10-CM

## 2024-06-13 DIAGNOSIS — I73.9 PAD (PERIPHERAL ARTERY DISEASE): ICD-10-CM

## 2024-06-13 PROCEDURE — 93926 LOWER EXTREMITY STUDY: CPT | Mod: TC,RT

## 2024-06-13 PROCEDURE — 93926 LOWER EXTREMITY STUDY: CPT | Mod: 26,RT,, | Performed by: RADIOLOGY

## 2024-06-21 ENCOUNTER — PATIENT OUTREACH (OUTPATIENT)
Dept: ADMINISTRATIVE | Facility: HOSPITAL | Age: 79
End: 2024-06-21
Payer: MEDICARE

## 2024-06-21 NOTE — PROGRESS NOTES
Population Health Chart Review & Patient Outreach Details      Additional [facility] Health Notes:               Updates Requested / Reviewed:      Updated Care Coordination Note, Care Everywhere, and Immunizations Reconciliation Completed or Queried: [facility] Topics Overdue:      [facility] Score: 1     Uncontrolled BP    Pneumonia Vaccine  RSV Vaccine                  Health Maintenance Topic(s) Outreach Outcomes & Actions Taken:    Provider Pt Reattribution - Outreach Outcomes & Actions Taken  : Upcoming Appt with Another Provider Already Scheduled

## 2024-06-26 ENCOUNTER — OFFICE VISIT (OUTPATIENT)
Dept: VASCULAR SURGERY | Facility: CLINIC | Age: 79
End: 2024-06-26
Payer: MEDICARE

## 2024-06-26 VITALS
BODY MASS INDEX: 28.16 KG/M2 | HEIGHT: 67 IN | OXYGEN SATURATION: 95 % | SYSTOLIC BLOOD PRESSURE: 136 MMHG | HEART RATE: 83 BPM | DIASTOLIC BLOOD PRESSURE: 65 MMHG | WEIGHT: 179.38 LBS

## 2024-06-26 DIAGNOSIS — I73.9 PAD (PERIPHERAL ARTERY DISEASE): Primary | ICD-10-CM

## 2024-06-26 PROCEDURE — 1101F PT FALLS ASSESS-DOCD LE1/YR: CPT | Mod: CPTII,S$GLB,, | Performed by: SURGERY

## 2024-06-26 PROCEDURE — 1126F AMNT PAIN NOTED NONE PRSNT: CPT | Mod: CPTII,S$GLB,, | Performed by: SURGERY

## 2024-06-26 PROCEDURE — 3288F FALL RISK ASSESSMENT DOCD: CPT | Mod: CPTII,S$GLB,, | Performed by: SURGERY

## 2024-06-26 PROCEDURE — 99999 PR PBB SHADOW E&M-EST. PATIENT-LVL III: CPT | Mod: PBBFAC,,, | Performed by: SURGERY

## 2024-06-26 PROCEDURE — 3078F DIAST BP <80 MM HG: CPT | Mod: CPTII,S$GLB,, | Performed by: SURGERY

## 2024-06-26 PROCEDURE — 99213 OFFICE O/P EST LOW 20 MIN: CPT | Mod: S$GLB,,, | Performed by: SURGERY

## 2024-06-26 PROCEDURE — 3075F SYST BP GE 130 - 139MM HG: CPT | Mod: CPTII,S$GLB,, | Performed by: SURGERY

## 2024-06-26 PROCEDURE — 1159F MED LIST DOCD IN RCRD: CPT | Mod: CPTII,S$GLB,, | Performed by: SURGERY

## 2024-06-26 NOTE — PROGRESS NOTES
Patient returns to clinic.  She had an angiogram with a right PTA stent proximally 3 months ago.  At this point she can walk as far she wants.  As matter of fact she is doing great, she walks to track a little over a mi 3 times a week plus does other exercises.  I am very impressed how active she is and how well she is doing walking.  She has no ulcers no sores on her feet no rest pain.  Her major complaint is that the Plavix and the aspirin combination is giving her some black and blue and marks on her skin.  I have discussed with the reason why we keep her on the Plavix and the aspirin and how it is better for longevity.  She agrees to stay on it.  She had an ultrasound which shows proximally 50% stenosis in the distal SFA.  She goes from .  I think it is about 50 maybe 60%.  She has having no symptoms.  I will keep a close eye on it.  I will have her return to clinic in 3 months with a repeat duplex scan.  If the velocities have increased we would consider doing a PTA and stent.  All of her questions were answered.  It has a pleasure to see this patient to see how well she is doing.  I was all of my patients would exercise and stay in shape she is on.  She is also followed by Dr. Mayorga who she said she will try to lose weight for.  All of her questions were answered we will see her back in a few months

## 2024-07-30 ENCOUNTER — OFFICE VISIT (OUTPATIENT)
Dept: FAMILY MEDICINE | Facility: CLINIC | Age: 79
End: 2024-07-30
Payer: MEDICARE

## 2024-07-30 VITALS
DIASTOLIC BLOOD PRESSURE: 72 MMHG | SYSTOLIC BLOOD PRESSURE: 138 MMHG | BODY MASS INDEX: 27.71 KG/M2 | WEIGHT: 176.56 LBS | HEIGHT: 67 IN | OXYGEN SATURATION: 98 % | HEART RATE: 78 BPM

## 2024-07-30 DIAGNOSIS — K21.9 GASTROESOPHAGEAL REFLUX DISEASE WITHOUT ESOPHAGITIS: ICD-10-CM

## 2024-07-30 DIAGNOSIS — R79.9 ABNORMAL FINDING OF BLOOD CHEMISTRY, UNSPECIFIED: ICD-10-CM

## 2024-07-30 DIAGNOSIS — Z98.890 PERIPHERAL ARTERIAL DISEASE WITH HISTORY OF REVASCULARIZATION: Primary | ICD-10-CM

## 2024-07-30 DIAGNOSIS — E03.8 SUBCLINICAL HYPOTHYROIDISM: ICD-10-CM

## 2024-07-30 DIAGNOSIS — M70.62 GREATER TROCHANTERIC BURSITIS OF LEFT HIP: ICD-10-CM

## 2024-07-30 DIAGNOSIS — E66.3 OVERWEIGHT WITH BODY MASS INDEX (BMI) OF 27 TO 27.9 IN ADULT: ICD-10-CM

## 2024-07-30 DIAGNOSIS — I73.9 PERIPHERAL ARTERIAL DISEASE WITH HISTORY OF REVASCULARIZATION: Primary | ICD-10-CM

## 2024-07-30 DIAGNOSIS — E78.2 MIXED HYPERLIPIDEMIA: ICD-10-CM

## 2024-07-30 PROCEDURE — 99999 PR PBB SHADOW E&M-EST. PATIENT-LVL III: CPT | Mod: PBBFAC,,, | Performed by: FAMILY MEDICINE

## 2024-07-30 PROCEDURE — 99214 OFFICE O/P EST MOD 30 MIN: CPT | Mod: S$GLB,,, | Performed by: FAMILY MEDICINE

## 2024-07-30 PROCEDURE — 3288F FALL RISK ASSESSMENT DOCD: CPT | Mod: CPTII,S$GLB,, | Performed by: FAMILY MEDICINE

## 2024-07-30 PROCEDURE — 1101F PT FALLS ASSESS-DOCD LE1/YR: CPT | Mod: CPTII,S$GLB,, | Performed by: FAMILY MEDICINE

## 2024-07-30 PROCEDURE — 3078F DIAST BP <80 MM HG: CPT | Mod: CPTII,S$GLB,, | Performed by: FAMILY MEDICINE

## 2024-07-30 PROCEDURE — 1160F RVW MEDS BY RX/DR IN RCRD: CPT | Mod: CPTII,S$GLB,, | Performed by: FAMILY MEDICINE

## 2024-07-30 PROCEDURE — 1159F MED LIST DOCD IN RCRD: CPT | Mod: CPTII,S$GLB,, | Performed by: FAMILY MEDICINE

## 2024-07-30 PROCEDURE — 1126F AMNT PAIN NOTED NONE PRSNT: CPT | Mod: CPTII,S$GLB,, | Performed by: FAMILY MEDICINE

## 2024-07-30 PROCEDURE — 3075F SYST BP GE 130 - 139MM HG: CPT | Mod: CPTII,S$GLB,, | Performed by: FAMILY MEDICINE

## 2024-07-30 RX ORDER — PANTOPRAZOLE SODIUM 20 MG/1
20 TABLET, DELAYED RELEASE ORAL DAILY
Qty: 90 TABLET | Refills: 3 | Status: SHIPPED | OUTPATIENT
Start: 2024-07-30

## 2024-07-30 RX ORDER — ATORVASTATIN CALCIUM 40 MG/1
40 TABLET, FILM COATED ORAL NIGHTLY
Qty: 90 TABLET | Refills: 3 | Status: SHIPPED | OUTPATIENT
Start: 2024-07-30 | End: 2025-07-30

## 2024-07-30 RX ORDER — CLOPIDOGREL BISULFATE 75 MG/1
75 TABLET ORAL DAILY
Qty: 90 TABLET | Refills: 3 | Status: SHIPPED | OUTPATIENT
Start: 2024-07-30 | End: 2025-07-30

## 2024-08-09 ENCOUNTER — PATIENT MESSAGE (OUTPATIENT)
Dept: SPORTS MEDICINE | Facility: CLINIC | Age: 79
End: 2024-08-09
Payer: MEDICARE

## 2024-08-09 DIAGNOSIS — M25.552 LEFT HIP PAIN: Primary | ICD-10-CM

## 2024-08-14 NOTE — PROGRESS NOTES
Subjective:     Mireille Akins     Chief Complaint   Patient presents with    Left Hip - Pain     HPI    Mireille is a 79 y.o. female coming in today for left hip pain that began about 4 month(s) ago, referred by Dr. Seo. Pt. describes the pain as a 8/10 achy pain that does not radiate. There was not a fall/injury/ or trauma associated with the onset of symptoms. The pain is better with rest and worse with nighttime, rolling over in bed, lying flat. Pt had an US guided GTB injection about 4 months ago at [facility]. She had good relief with this for several months.She took a medrol dose pack with minimal relief. She takes a core class at the , no specific HEP. Taking Plavix and Lipitor for venous diease. Pt. Denies any other musculoskeletal complaints at this time.     Joint instability? no  Mechanical locking/clicking? no  Affecting ADL's? yes  Affecting sleep? yes    Occupation: retired    Review of Systems   Constitutional:  Negative for chills and fever.   Musculoskeletal:  Positive for joint pain. Negative for back pain, falls, myalgias and neck pain.   Neurological:  Negative for dizziness, tingling, focal weakness, weakness and headaches.     PAST MEDICAL HISTORY:   Past Medical History:   Diagnosis Date    Angiomyolipoma 2022    Asthma     last attack 15 years ago    PONV (postoperative nausea and vomiting)     Subclinical hypothyroidism      PAST SURGICAL HISTORY:   Past Surgical History:   Procedure Laterality Date    ANGIOGRAM, EXTREMITY, UNILATERAL Right 2024    Procedure: ANGIOGRAM, EXTREMITY, UNILATERAL, PTA ,;  Surgeon: Teo Murry MD;  Location: [facility] OR [address] [place] [address];  Service: Vascular;  Laterality: Right;  8.5 min  355.30 mGy  73.2661 Gy.cm  96ml Dye    BACK SURGERY       SECTION      CHOLECYSTECTOMY      HYSTERECTOMY      knee scoped      rotator cuff surgery Right     STENT, SUPERFICIAL FEMORAL ARTERY Right 2024    Procedure: STENT, SUPERFICIAL FEMORAL  ARTERY;  Surgeon: Teo Murry MD;  Location: [facility] OR [address] [place] [address];  Service: Vascular;  Laterality: Right;    TONSILLECTOMY       FAMILY HISTORY:   Family History   Problem Relation Name Age of Onset    Heart disease Mother      Prostate cancer Neg Hx      Kidney disease Neg Hx      Breast cancer Neg Hx      Colon cancer Neg Hx      Ovarian cancer Neg Hx       SOCIAL HISTORY:   Social History     Socioeconomic History    Marital status:    Tobacco Use    Smoking status: Former     Types: Cigarettes     Start date: 6/2/1965     Passive exposure: Past    Smokeless tobacco: Never    Tobacco comments:     quit at age 20   Substance and Sexual Activity    Alcohol use: Not Currently    Drug use: No    Sexual activity: Yes     Partners: Male     Birth control/protection: See Surgical Hx     Social Determinants of Health     Financial Resource Strain: Low Risk  (9/6/2022)    Overall Financial Resource Strain (CARDIA)     Difficulty of Paying Living Expenses: Not hard at all   Food Insecurity: No Food Insecurity (9/6/2022)    Hunger Vital Sign     Worried About Running Out of Food in the Last Year: Never true     Ran Out of Food in the Last Year: Never true   Transportation Needs: No Transportation Needs (9/6/2022)    PRAPARE - Transportation     Lack of Transportation (Medical): No     Lack of Transportation (Non-Medical): No   Physical Activity: Sufficiently Active (8/10/2020)    Exercise Vital Sign     Days of Exercise per Week: 3 days     Minutes of Exercise per Session: 60 min   Stress: Stress Concern Present (9/6/2022)    [employer] - Occupational Stress Questionnaire     Feeling of Stress : Very much   Housing Stability: Unknown (9/6/2022)    Housing Stability Vital Sign     Unable to Pay for Housing in the Last Year: No     Unstable Housing in the Last Year: No     MEDICATIONS:   Current Outpatient Medications:     aspirin (ECOTRIN) 81 MG EC tablet, Take 1 tablet (81 mg total) by  mouth once daily., Disp: 360 tablet, Rfl: 0    atorvastatin (LIPITOR) 40 MG tablet, Take 1 tablet (40 mg total) by mouth every evening., Disp: 90 tablet, Rfl: 3    clopidogreL (PLAVIX) 75 mg tablet, Take 1 tablet (75 mg total) by mouth once daily., Disp: 90 tablet, Rfl: 3    estradioL (ESTRACE) 2 MG tablet, Take 1 tablet (2 mg total) by mouth once daily., Disp: 90 tablet, Rfl: 3    glucosamine/chondr booth A sod (OSTEO BI-FLEX ORAL), Take by mouth., Disp: , Rfl:     MULTIVITAMIN W-MINERALS/LUTEIN (CENTRUM SILVER ORAL), Take 1 tablet by mouth once daily., Disp: , Rfl:     pantoprazole (PROTONIX) 20 MG tablet, Take 1 tablet (20 mg total) by mouth once daily., Disp: 90 tablet, Rfl: 3    ALLERGIES: Review of patient's allergies indicates:  No Known Allergies    Objective:     VITAL SIGNS: /67   Pulse 86   LMP  (LMP Unknown)    General    Nursing note and vitals reviewed.  Constitutional: She is oriented to person, place, and time. She appears well-developed and well-nourished.   HENT:   Head: Normocephalic and atraumatic.   no nasal discharge, no external ear redness or discharge   Eyes:   EOM is full and smooth  No eye redness or discharge   Neck: Neck supple. No tracheal deviation present.   Cardiovascular:  Normal rate.            2+ Radial pulse bilaterally  2+ Dorsalis Pedis pulse bilaterally  No LE edema appreciated   Pulmonary/Chest: Effort normal. No respiratory distress.   Abdominal: She exhibits no distension.   No rigidity   Neurological: She is alert and oriented to person, place, and time. She exhibits normal muscle tone. Coordination normal.   See details below   Psychiatric: She has a normal mood and affect. Her behavior is normal.           MUSCULOSKELETAL EXAM  HIP: left HIP  The affected hip is compared to the contralateral hip.    Observation:    There is no edema, erythema, or ecchymosis in the lumbosacral region.   There is no Trendelenburg sign on either side  + obvious pelvic obliquity while  standing (improved following OMT)   No thoracolumbar scoliosis observed.    No midline skin abnormalities.  No atrophy noted in the lower limbs.  Gait: Non-antalgic with Neutral ankle mechanics and Neutral medial arch  Poor bilateral pelvic stability with bilateral hip hiking compensatory pattern noted with single leg raise    ROM (* = with pain):  Passive hip flexion to 120° on left and 120° on right  Passive hip internal rotation to 45° on left* (left lateral hip pain) and 45° on right  Passive hip external rotation to 45° on left and 45° on right   Passive hip abduction to 45° on left and 45° on right  All motions above are without pain.    Tenderness To Palpation:  No tenderness at the ASIS, AIIS, PSIS, PIIS, iliac crest, pubic bones, ischial tuberosity.  No tenderness throughout the lumbar spine, iliolumbar region, or posterior pelvis.  No tenderness throughout the sacrum   + deep hip external rotator muscular tenderness on left  No tenderness over the greater trochanteric bursa or greater/lesser trochanters.  + mild tenderness at the glut attachments on the greater trochanter  No tenderness over proximal IT band or hip flexor musculature.    Strength Testing (* = with pain):  Hip flexion - 5/5 on left and 5/5 on right  Hip extension - 5/5 on left and 5/5 on right  Hip adduction - 5/5 on left and 5/5 on right  Hip abduction - 5/5 on left and 5/5 on right  Knee flexion - 5/5 on left and 5/5 on right  Knee extension - 5/5 on left and 5/5 on right  Glutaeus medius - 5-/5 on left and 5-/5 on right    Special Tests:  Standing Trendelenburg test - negative    Seated straight leg raise - negative  Supine straight leg raise - negative  Hamstring flexibility symmetric    Log roll - negative  FLOR - negative  FADIR - negative  Scour test - negative  No pain with posterior hip capsule compression    ASIS compression test - negative  SI drawer test - negative   Thigh thrust test - negative     Piriformis test (Bonnet's) -  negative  Ely's test - negative  Quadriceps flexibility symmetric.  Nicol test - negative  Sergio compression test - negative    Fulcrum test - negative    Leg lengths symmetric following OMT to the pelvis.     Neurovascular Exam:  Normal gait without Trendelenburg or antalgia.  2+ femoral, DP, and PT pulses BL.  No skin changes, no abnormal hair distribution.  Sensation intact to light touch throughout the obturator and medial/lateral/posterior femoral cutaneous nerves.  Capillary refill intact to <2 seconds in all lower limb digits.    TART (Tissue texture abnormality, Asymmetry,  Restriction of motion and/or Tenderness) changes:     Thoracic Spine   T1 Neutral   T2 Neutral   T3 Neutral   T4 Neutral   T5 Neutral   T6 Neutral   T7 Neutral   T8 Neutral   T9 Neutral   T10 Neutral   T11 NS-left,R-right   T12 NS-left,R-right     Rib cage: Neutral     Lumbar Spine   L1 NS-left,R-right   L2 NS-left,R-right   L3 Neutral   L4 FRS RIGHT   L5 FRS RIGHT     Pelvis:  Innominate:Right anterior rotation  Pubic bone:Right inferior pubic shear    Sacrum:Left on Right sacral torsion    Lower extremity: left bullseye (deep hip external rotators) Herniated trigger point (HTP) fascial distortion        Key   F= Flexed   E = Extended   R = Rotated   S = Sidebent   TTA = tissue texture abnormality     IMAGIN. X-ray ordered due to left hip pain. (AP pelvis and frogleg lateral  left views) taken today.   2. X-ray images were reviewed personally by me and then directly with patient.  3. FINDINGS: X-ray images obtained demonstrate mild bilateral hip osteoarthritis with mild joint space narrowing and mild osteophytic spurring.  No fracture or dislocation.   Incidentally noted is lower lumbar degenerative change.  4. IMPRESSION:  Mild bilateral hip DJD changes.  Lower lumbar degenerative changes also noted.    Assessment:      Encounter Diagnoses   Name Primary?    Lateral pain of left hip Yes    Bilateral primary osteoarthritis of hip      Myalgia     Somatic dysfunction of lumbar region     Sacral region somatic dysfunction     Somatic dysfunction of pelvic region     Somatic dysfunction of lower extremity     Somatic dysfunction of thoracic region         Plan:   1. Left lateral hip pain secondary to weak gluteal muscles and poor pelvic/core stability with compensatory muscle firing patterns, including over-firing of deep hip external rotators.  Underlying bilateral hip DJD changes noted on today's x-rays, however no significant intra-articular symptoms appreciated on physical exam.  Previous left GTB CSI temporary overly resolved her pain for approximately 4 months.    - Continue over-the-counter Tylenol and Ice up to 20 minutes at a time prn for pain control  - OMT performed today to address biomechanical contributions to pain  - HEP started  -  X-ray images of left hip taken today (AP pelvis and frogleg lateral  left views) showed Mild bilateral hip DJD changes.  Lower lumbar degenerative changes also noted.. Images were personally reviewed with patient.    2. OMT 5-6 regions. Oral consent obtained.  Reviewed benefits and potential side effects, including bruising at site of treatment and increased soreness for the next 24-48 hours. Pt. Instructed to increased water intake by 1 L today and tomorrow to help with any residual soreness.   - OMT indicated today due to signs and symptoms as well as local and remote somatic dysfunction findings and their related neurokinetic, lymphatic, fascial and/or arteriovenous body connections.   - OMT techniques used: Myofascial Release, Muscle Energy, and Fascial Distortion Model   - Treatment was tolerated well. Improvement noted in segmental mobility post-treatment in dysfunctional regions. There were no adverse events and no complications immediately following treatment.     3. Pt. Given the following HEP:  A)  Pelvic clock exercises given to do from the 6-12 o'clock positions:10-15 reps, twice daily. Hand  out of exercise also given.   B) Clam shell exercises bilaterally: hold leg in abducted and externally rotated position for 5-10 seconds, repeat 5-10 times  C) Lower abdominal muscle isotonic exercises: Rotate pelvis into the 6 o'clock position and holding it to engage lower abdominal muscles. Then lift up leg, one at a time, alternating for 10-15 reps. Repeat exercises 1-2 times daily. Handout given.     [id number] HOME EXERCISE PROGRAM (HEP):  The patient was taught a homegoing physical therapy regimen as described above. The patient demonstrated understanding of the exercises and proper technique of their execution. This interaction took 15 minutes.     4. Follow-up in 3-4 weeks for reevaluation    5. Patient agreeable to today's plan and all questions were answered    This note is dictated using the M*Modal Fluency Direct word recognition program. There are word recognition mistakes that are occasionally missed on review.

## 2024-08-15 ENCOUNTER — OFFICE VISIT (OUTPATIENT)
Dept: SPORTS MEDICINE | Facility: CLINIC | Age: 79
End: 2024-08-15
Payer: MEDICARE

## 2024-08-15 ENCOUNTER — HOSPITAL ENCOUNTER (OUTPATIENT)
Dept: RADIOLOGY | Facility: HOSPITAL | Age: 79
Discharge: HOME | End: 2024-08-15
Attending: NEUROMUSCULOSKELETAL MEDICINE & OMM
Payer: MEDICARE

## 2024-08-15 VITALS — DIASTOLIC BLOOD PRESSURE: 67 MMHG | HEART RATE: 86 BPM | SYSTOLIC BLOOD PRESSURE: 138 MMHG

## 2024-08-15 DIAGNOSIS — M16.0 BILATERAL PRIMARY OSTEOARTHRITIS OF HIP: ICD-10-CM

## 2024-08-15 DIAGNOSIS — M25.552 LATERAL PAIN OF LEFT HIP: Primary | ICD-10-CM

## 2024-08-15 DIAGNOSIS — M99.02 SOMATIC DYSFUNCTION OF THORACIC REGION: ICD-10-CM

## 2024-08-15 DIAGNOSIS — M99.03 SOMATIC DYSFUNCTION OF LUMBAR REGION: ICD-10-CM

## 2024-08-15 DIAGNOSIS — M99.05 SOMATIC DYSFUNCTION OF PELVIC REGION: ICD-10-CM

## 2024-08-15 DIAGNOSIS — M25.552 LEFT HIP PAIN: ICD-10-CM

## 2024-08-15 DIAGNOSIS — M99.06 SOMATIC DYSFUNCTION OF LOWER EXTREMITY: ICD-10-CM

## 2024-08-15 DIAGNOSIS — M99.04 SACRAL REGION SOMATIC DYSFUNCTION: ICD-10-CM

## 2024-08-15 DIAGNOSIS — M79.10 MYALGIA: ICD-10-CM

## 2024-08-15 PROCEDURE — 1159F MED LIST DOCD IN RCRD: CPT | Mod: CPTII,S$GLB,, | Performed by: NEUROMUSCULOSKELETAL MEDICINE & OMM

## 2024-08-15 PROCEDURE — 1101F PT FALLS ASSESS-DOCD LE1/YR: CPT | Mod: CPTII,S$GLB,, | Performed by: NEUROMUSCULOSKELETAL MEDICINE & OMM

## 2024-08-15 PROCEDURE — 98927 OSTEOPATH MANJ 5-6 REGIONS: CPT | Mod: S$GLB,,, | Performed by: NEUROMUSCULOSKELETAL MEDICINE & OMM

## 2024-08-15 PROCEDURE — 3075F SYST BP GE 130 - 139MM HG: CPT | Mod: CPTII,S$GLB,, | Performed by: NEUROMUSCULOSKELETAL MEDICINE & OMM

## 2024-08-15 PROCEDURE — 73502 X-RAY EXAM HIP UNI 2-3 VIEWS: CPT | Mod: TC,PO,LT

## 2024-08-15 PROCEDURE — 1126F AMNT PAIN NOTED NONE PRSNT: CPT | Mod: CPTII,S$GLB,, | Performed by: NEUROMUSCULOSKELETAL MEDICINE & OMM

## 2024-08-15 PROCEDURE — 99204 OFFICE O/P NEW MOD 45 MIN: CPT | Mod: 25,S$GLB,, | Performed by: NEUROMUSCULOSKELETAL MEDICINE & OMM

## 2024-08-15 PROCEDURE — 3078F DIAST BP <80 MM HG: CPT | Mod: CPTII,S$GLB,, | Performed by: NEUROMUSCULOSKELETAL MEDICINE & OMM

## 2024-08-15 PROCEDURE — 3288F FALL RISK ASSESSMENT DOCD: CPT | Mod: CPTII,S$GLB,, | Performed by: NEUROMUSCULOSKELETAL MEDICINE & OMM

## 2024-08-15 PROCEDURE — 99999 PR PBB SHADOW E&M-EST. PATIENT-LVL III: CPT | Mod: PBBFAC,,, | Performed by: NEUROMUSCULOSKELETAL MEDICINE & OMM

## 2024-08-15 PROCEDURE — 97110 THERAPEUTIC EXERCISES: CPT | Mod: S$GLB,,, | Performed by: NEUROMUSCULOSKELETAL MEDICINE & OMM

## 2024-08-15 PROCEDURE — 1160F RVW MEDS BY RX/DR IN RCRD: CPT | Mod: CPTII,S$GLB,, | Performed by: NEUROMUSCULOSKELETAL MEDICINE & OMM

## 2024-09-03 DIAGNOSIS — E78.2 MIXED HYPERLIPIDEMIA: ICD-10-CM

## 2024-09-03 RX ORDER — ATORVASTATIN CALCIUM 40 MG/1
40 TABLET, FILM COATED ORAL NIGHTLY
Qty: 90 TABLET | Refills: 3 | Status: CANCELLED | OUTPATIENT
Start: 2024-09-03 | End: 2025-09-03

## 2024-09-05 ENCOUNTER — PATIENT MESSAGE (OUTPATIENT)
Dept: VASCULAR SURGERY | Facility: CLINIC | Age: 79
End: 2024-09-05
Payer: MEDICARE

## 2024-09-05 ENCOUNTER — OFFICE VISIT (OUTPATIENT)
Dept: SPORTS MEDICINE | Facility: CLINIC | Age: 79
End: 2024-09-05
Payer: MEDICARE

## 2024-09-05 VITALS — SYSTOLIC BLOOD PRESSURE: 151 MMHG | HEART RATE: 70 BPM | DIASTOLIC BLOOD PRESSURE: 75 MMHG

## 2024-09-05 DIAGNOSIS — M79.10 MYALGIA: ICD-10-CM

## 2024-09-05 DIAGNOSIS — M99.05 SOMATIC DYSFUNCTION OF PELVIC REGION: ICD-10-CM

## 2024-09-05 DIAGNOSIS — M99.04 SACRAL REGION SOMATIC DYSFUNCTION: ICD-10-CM

## 2024-09-05 DIAGNOSIS — M99.03 SOMATIC DYSFUNCTION OF LUMBAR REGION: ICD-10-CM

## 2024-09-05 DIAGNOSIS — M99.06 SOMATIC DYSFUNCTION OF LOWER EXTREMITY: ICD-10-CM

## 2024-09-05 DIAGNOSIS — M25.552 LATERAL PAIN OF LEFT HIP: Primary | ICD-10-CM

## 2024-09-05 DIAGNOSIS — M99.02 SOMATIC DYSFUNCTION OF THORACIC REGION: ICD-10-CM

## 2024-09-05 DIAGNOSIS — M16.0 BILATERAL PRIMARY OSTEOARTHRITIS OF HIP: ICD-10-CM

## 2024-09-05 PROCEDURE — 99213 OFFICE O/P EST LOW 20 MIN: CPT | Mod: 25,S$GLB,, | Performed by: NEUROMUSCULOSKELETAL MEDICINE & OMM

## 2024-09-05 PROCEDURE — 1101F PT FALLS ASSESS-DOCD LE1/YR: CPT | Mod: CPTII,S$GLB,, | Performed by: NEUROMUSCULOSKELETAL MEDICINE & OMM

## 2024-09-05 PROCEDURE — 1159F MED LIST DOCD IN RCRD: CPT | Mod: CPTII,S$GLB,, | Performed by: NEUROMUSCULOSKELETAL MEDICINE & OMM

## 2024-09-05 PROCEDURE — 3288F FALL RISK ASSESSMENT DOCD: CPT | Mod: CPTII,S$GLB,, | Performed by: NEUROMUSCULOSKELETAL MEDICINE & OMM

## 2024-09-05 PROCEDURE — 99999 PR PBB SHADOW E&M-EST. PATIENT-LVL III: CPT | Mod: PBBFAC,,, | Performed by: NEUROMUSCULOSKELETAL MEDICINE & OMM

## 2024-09-05 PROCEDURE — 3078F DIAST BP <80 MM HG: CPT | Mod: CPTII,S$GLB,, | Performed by: NEUROMUSCULOSKELETAL MEDICINE & OMM

## 2024-09-05 PROCEDURE — 98927 OSTEOPATH MANJ 5-6 REGIONS: CPT | Mod: S$GLB,,, | Performed by: NEUROMUSCULOSKELETAL MEDICINE & OMM

## 2024-09-05 PROCEDURE — 1126F AMNT PAIN NOTED NONE PRSNT: CPT | Mod: CPTII,S$GLB,, | Performed by: NEUROMUSCULOSKELETAL MEDICINE & OMM

## 2024-09-05 PROCEDURE — 3077F SYST BP >= 140 MM HG: CPT | Mod: CPTII,S$GLB,, | Performed by: NEUROMUSCULOSKELETAL MEDICINE & OMM

## 2024-09-05 PROCEDURE — 97110 THERAPEUTIC EXERCISES: CPT | Mod: S$GLB,,, | Performed by: NEUROMUSCULOSKELETAL MEDICINE & OMM

## 2024-09-05 PROCEDURE — 1160F RVW MEDS BY RX/DR IN RCRD: CPT | Mod: CPTII,S$GLB,, | Performed by: NEUROMUSCULOSKELETAL MEDICINE & OMM

## 2024-09-05 NOTE — PROGRESS NOTES
Subjective:     Mireille Akins     Chief Complaint   Patient presents with    Left Hip - Pain     HPI    Mireille is a 79 y.o. female coming in today for left hip pain. Since last visit the pain has Improved.  Pt is compliant with HEP. The pain is better with rest and discontinuing her Lipitor and worse with nighttime, rolling over in bed, lying flat. Pt. describes the pain as a 0/10 currently. Pt qiana still have pain with lying in bed or rolling over at night. There has not been any new a fall/injury/ or traumas since last visit.  Pt. denies any new musculoskeletal complaints at this time.     Office note from 8/15/24 reviewed    Joint instability? no  Mechanical locking/clicking? no  Affecting ADL's? yes  Affecting sleep? yes    Occupation: retired    PAST MEDICAL HISTORY:   Past Medical History:   Diagnosis Date    Angiomyolipoma 2022    Asthma     last attack 15 years ago    PONV (postoperative nausea and vomiting)     Subclinical hypothyroidism      PAST SURGICAL HISTORY:   Past Surgical History:   Procedure Laterality Date    ANGIOGRAM, EXTREMITY, UNILATERAL Right 2024    Procedure: ANGIOGRAM, EXTREMITY, UNILATERAL, PTA ,;  Surgeon: Teo Murry MD;  Location: [facility] OR [address] [place] [address];  Service: Vascular;  Laterality: Right;  8.5 min  355.30 mGy  73.2661 Gy.cm  96ml Dye    BACK SURGERY       SECTION      CHOLECYSTECTOMY      HYSTERECTOMY      knee scoped      rotator cuff surgery Right     STENT, SUPERFICIAL FEMORAL ARTERY Right 2024    Procedure: STENT, SUPERFICIAL FEMORAL ARTERY;  Surgeon: Teo Murry MD;  Location: [facility] OR [address] [place] [address];  Service: Vascular;  Laterality: Right;    TONSILLECTOMY       FAMILY HISTORY:   Family History   Problem Relation Name Age of Onset    Heart disease Mother      Prostate cancer Neg Hx      Kidney disease Neg Hx      Breast cancer Neg Hx      Colon cancer Neg Hx      Ovarian cancer Neg Hx       SOCIAL HISTORY:   Social History     Socioeconomic  History    Marital status:    Tobacco Use    Smoking status: Former     Types: Cigarettes     Start date: 6/2/1965     Passive exposure: Past    Smokeless tobacco: Never    Tobacco comments:     quit at age 20   Substance and Sexual Activity    Alcohol use: Not Currently    Drug use: No    Sexual activity: Yes     Partners: Male     Birth control/protection: See Surgical Hx     Social Determinants of Health     Financial Resource Strain: Low Risk  (9/6/2022)    Overall Financial Resource Strain (CARDIA)     Difficulty of Paying Living Expenses: Not hard at all   Food Insecurity: No Food Insecurity (9/6/2022)    Hunger Vital Sign     Worried About Running Out of Food in the Last Year: Never true     Ran Out of Food in the Last Year: Never true   Transportation Needs: No Transportation Needs (9/6/2022)    PRAPARE - Transportation     Lack of Transportation (Medical): No     Lack of Transportation (Non-Medical): No   Physical Activity: Sufficiently Active (8/10/2020)    Exercise Vital Sign     Days of Exercise per Week: 3 days     Minutes of Exercise per Session: 60 min   Stress: Stress Concern Present (9/6/2022)    [employer] - Occupational Stress Questionnaire     Feeling of Stress : Very much   Housing Stability: Unknown (9/6/2022)    Housing Stability Vital Sign     Unable to Pay for Housing in the Last Year: No     Unstable Housing in the Last Year: No     MEDICATIONS:   Current Outpatient Medications:     aspirin (ECOTRIN) 81 MG EC tablet, Take 1 tablet (81 mg total) by mouth once daily., Disp: 360 tablet, Rfl: 0    clopidogreL (PLAVIX) 75 mg tablet, Take 1 tablet (75 mg total) by mouth once daily., Disp: 90 tablet, Rfl: 3    estradioL (ESTRACE) 2 MG tablet, Take 1 tablet (2 mg total) by mouth once daily., Disp: 90 tablet, Rfl: 3    MULTIVITAMIN W-MINERALS/LUTEIN (CENTRUM SILVER ORAL), Take 1 tablet by mouth once daily., Disp: , Rfl:     pantoprazole (PROTONIX) 20 MG tablet,  Take 1 tablet (20 mg total) by mouth once daily., Disp: 90 tablet, Rfl: 3    atorvastatin (LIPITOR) 40 MG tablet, Take 1 tablet (40 mg total) by mouth every evening. (Patient not taking: Reported on 9/5/2024), Disp: 90 tablet, Rfl: 3    glucosamine/chondr booth A sod (OSTEO BI-FLEX ORAL), Take by mouth. (Patient not taking: Reported on 9/5/2024), Disp: , Rfl:     ALLERGIES: Review of patient's allergies indicates:  No Known Allergies    Objective:     VITAL SIGNS: BP (!) 151/75   Pulse 70   LMP  (LMP Unknown)    General    Vitals reviewed.  Constitutional: She is oriented to person, place, and time. She appears well-developed and well-nourished.   Neurological: She is alert and oriented to person, place, and time.   Psychiatric: She has a normal mood and affect. Her behavior is normal.           MUSCULOSKELETAL EXAM  HIP: left HIP  The affected hip is compared to the contralateral hip.    Observation:    There is no edema, erythema, or ecchymosis in the lumbosacral region.   There is no Trendelenburg sign on either side  No obvious pelvic obliquity while standing   No thoracolumbar scoliosis observed.    No midline skin abnormalities.  No atrophy noted in the lower limbs.  Gait: Non-antalgic with Neutral ankle mechanics and Neutral medial arch  Poor bilateral pelvic stability with bilateral hip hiking compensatory pattern noted with single leg raise    ROM (* = with pain):  Passive hip flexion to 120° on left and 120° on right  Passive hip internal rotation to 45° on left and 45° on right  Passive hip external rotation to 45° on left and 45° on right   Passive hip abduction to 45° on left and 45° on right  All motions above are without pain.    Tenderness To Palpation:  No tenderness at the ASIS, AIIS, PSIS, PIIS, iliac crest, pubic bones, ischial tuberosity.  No tenderness throughout the lumbar spine, iliolumbar region, or posterior pelvis.  No tenderness throughout the sacrum   No deep hip external rotator muscular  tenderness on left  No tenderness over the greater trochanteric bursa or greater/lesser trochanters.  + mild tenderness at the glut attachments on the greater trochanter  No tenderness over proximal IT band or hip flexor musculature.    Strength Testing (* = with pain):  Hip flexion - 5/5 on left and 5/5 on right  Hip extension - 5/5 on left and 5/5 on right  Hip adduction - 5/5 on left and 5/5 on right  Hip abduction - 5/5 on left and 5/5 on right  Knee flexion - 5/5 on left and 5/5 on right  Knee extension - 5/5 on left and 5/5 on right  Glutaeus medius - 5/5 on left and 5/5 on right    Special Tests:  Standing Trendelenburg test - negative    Seated straight leg raise - negative  Supine straight leg raise - negative  Hamstring flexibility symmetric    Log roll - negative  FLOR - negative  FADIR - negative  Scour test - negative  No pain with posterior hip capsule compression    ASIS compression test - negative  SI drawer test - negative   Thigh thrust test - negative     Piriformis test (Bonnet's) - negative  Ely's test - negative  Quadriceps flexibility symmetric.  Nicol test - negative  Sergio compression test - negative    Fulcrum test - negative    Leg lengths symmetric following OMT to the pelvis.     Neurovascular Exam:  Normal gait without Trendelenburg or antalgia.  2+ femoral, DP, and PT pulses BL.  No skin changes, no abnormal hair distribution.  Sensation intact to light touch throughout the obturator and medial/lateral/posterior femoral cutaneous nerves.  Capillary refill intact to <2 seconds in all lower limb digits.    TART (Tissue texture abnormality, Asymmetry,  Restriction of motion and/or Tenderness) changes:     Thoracic Spine   T1 Neutral   T2 Neutral   T3 Neutral   T4 Neutral   T5 Neutral   T6 Neutral   T7 Neutral   T8 Neutral   T9 Neutral   T10 Neutral   T11 NS-left,R-right   T12 NS-left,R-right     Rib cage: Neutral     Lumbar Spine   L1 NS-left,R-right   L2 NS-left,R-right   L3 Neutral   L4  FRS RIGHT   L5 FRS RIGHT     Pelvis:  Innominate:Right anterior rotation  Pubic bone:Right inferior pubic shear    Sacrum:Left on Right sacral torsion    Lower extremity: left lateral hip Herniated trigger point (HTP) fascial distortion      Key   F= Flexed   E = Extended   R = Rotated   S = Sidebent   TTA = tissue texture abnormality     Assessment:      Encounter Diagnoses   Name Primary?    Lateral pain of left hip Yes    Bilateral primary osteoarthritis of hip     Myalgia     Somatic dysfunction of thoracic region     Somatic dysfunction of lumbar region     Sacral region somatic dysfunction     Somatic dysfunction of pelvic region     Somatic dysfunction of lower extremity         Plan:   1. Left lateral hip pain secondary to weak gluteal muscles and poor pelvic/core stability with compensatory muscle firing patterns, including over-firing of deep hip external rotators- improved.  Underlying bilateral hip DJD changes noted on today's x-rays, however no significant intra-articular symptoms appreciated on physical exam.   - Continue over-the-counter Tylenol and Ice up to 20 minutes at a time prn for pain control  - OMT performed again today to address biomechanical contributions to pain  - HEP reviewed  - recommend notifiying cardiologist, vascular surgeon, and PCP of recent self discontinuation of lipitor.   -  X-ray images of left hip taken 8/15/24 (AP pelvis and frogleg lateral  left views) showed Mild bilateral hip DJD changes.  Lower lumbar degenerative changes also noted.     2. OMT 5-6 regions. Oral consent obtained.  Reviewed benefits and potential side effects, including bruising at site of treatment and increased soreness for the next 24-48 hours. Pt. Instructed to increased water intake by 1 L today and tomorrow to help with any residual soreness.   - OMT indicated today due to signs and symptoms as well as local and remote somatic dysfunction findings and their related neurokinetic, lymphatic, fascial  and/or arteriovenous body connections.   - OMT techniques used: Myofascial Release, Muscle Energy, and Fascial Distortion Model   - Treatment was tolerated well. Improvement noted in segmental mobility post-treatment in dysfunctional regions. There were no adverse events and no complications immediately following treatment.     3. Continue the following HEP:  A)  Pelvic clock exercises given to do from the 6-12 o'clock positions:10-15 reps, twice daily. Hand out of exercise also given.   B) Clam shell exercises bilaterally: hold leg in abducted and externally rotated position for 5-10 seconds, repeat 5-10 times  C) Lower abdominal muscle isotonic exercises: Rotate pelvis into the 6 o'clock position and holding it to engage lower abdominal muscles. Then lift up leg, one at a time, alternating for 10-15 reps. Repeat exercises 1-2 times daily. Handout given.   Add  E) Abduction resistance band squats: 10-15 reps, 3-4 times a week  F) Abduction resistant band walks:10-15 reps, work up to 3 sets, 3 times a week    [address]26 HOME EXERCISE PROGRAM (HEP):  The patient was taught a homegoing physical therapy regimen as described above. The patient demonstrated understanding of the exercises and proper technique of their execution. This interaction took 15 minutes.     4. Follow-up as needed if pain deteriorates or new issue arises    5. Patient agreeable to today's plan and all questions were answered    This note is dictated using the M*Modal Fluency Direct word recognition program. There are word recognition mistakes that are occasionally missed on review.

## 2024-09-10 ENCOUNTER — HOSPITAL ENCOUNTER (OUTPATIENT)
Dept: CARDIOLOGY | Facility: HOSPITAL | Age: 79
Discharge: HOME | End: 2024-09-10
Attending: SURGERY
Payer: MEDICARE

## 2024-09-10 DIAGNOSIS — I73.9 PAD (PERIPHERAL ARTERY DISEASE): ICD-10-CM

## 2024-09-10 PROCEDURE — 93925 LOWER EXTREMITY STUDY: CPT | Mod: 26,,, | Performed by: INTERNAL MEDICINE

## 2024-09-10 PROCEDURE — 93925 LOWER EXTREMITY STUDY: CPT

## 2024-09-11 LAB
LEFT CFA PSV: 91 CM/S
LEFT DORSALIS PEDIS PSV: 95 CM/S
LEFT PERONEAL SYS PSV: 11 CM/S
LEFT POPLITEAL PSV: 56 CM/S
LEFT POST TIBIAL SYS PSV: 25 CM/S
LEFT PROFUNDA SYS PSV: 132 CM/S
LEFT SUPER FEMORAL DIST SYS PSV: 408 CM/S
LEFT SUPER FEMORAL MID SYS PSV: 348 CM/S
LEFT SUPER FEMORAL PROX SYS PSV: 116 CM/S
LEFT TIB/PER TRUNK SYS PSV: 60 CM/S
RIGHT ANT TIBIAL SYS PSV: 99 CM/S
RIGHT CFA PSV: 113 CM/S
RIGHT DORSALIS PEDIS PSV: 31 CM/S
RIGHT PERONEAL SYS PSV: 94 CM/S
RIGHT POPLITEAL PSV: 75 CM/S
RIGHT POST TIBIAL SYS PSV: 78 CM/S
RIGHT PROFUNDA SYS PSV: 106 CM/S
RIGHT SUPER FEMORAL DIST SYS PSV: 186 CM/S
RIGHT SUPER FEMORAL MID SYS PSV: 317 CM/S
RIGHT SUPER FEMORAL PROX SYS PSV: 123 CM/S
RIGHT TIB/PER TRUNK SYS PSV: 65 CM/S

## 2024-10-02 ENCOUNTER — TELEPHONE (OUTPATIENT)
Dept: VASCULAR SURGERY | Facility: CLINIC | Age: 79
End: 2024-10-02
Payer: MEDICARE

## 2024-10-02 ENCOUNTER — OFFICE VISIT (OUTPATIENT)
Dept: VASCULAR SURGERY | Facility: CLINIC | Age: 79
End: 2024-10-02
Payer: MEDICARE

## 2024-10-02 ENCOUNTER — TELEPHONE (OUTPATIENT)
Dept: VASCULAR SURGERY | Facility: CLINIC | Age: 79
End: 2024-10-02

## 2024-10-02 VITALS
HEIGHT: 67 IN | WEIGHT: 178 LBS | HEART RATE: 86 BPM | BODY MASS INDEX: 27.94 KG/M2 | OXYGEN SATURATION: 98 % | SYSTOLIC BLOOD PRESSURE: 147 MMHG | DIASTOLIC BLOOD PRESSURE: 63 MMHG

## 2024-10-02 DIAGNOSIS — R82.90 FOUL SMELLING URINE: Primary | ICD-10-CM

## 2024-10-02 DIAGNOSIS — E78.2 MIXED HYPERLIPIDEMIA: ICD-10-CM

## 2024-10-02 DIAGNOSIS — I73.9 PAD (PERIPHERAL ARTERY DISEASE): Primary | ICD-10-CM

## 2024-10-02 DIAGNOSIS — R82.90 FOUL SMELLING URINE: ICD-10-CM

## 2024-10-02 PROCEDURE — 1126F AMNT PAIN NOTED NONE PRSNT: CPT | Mod: CPTII,S$GLB,, | Performed by: SURGERY

## 2024-10-02 PROCEDURE — 1101F PT FALLS ASSESS-DOCD LE1/YR: CPT | Mod: CPTII,S$GLB,, | Performed by: SURGERY

## 2024-10-02 PROCEDURE — 3078F DIAST BP <80 MM HG: CPT | Mod: CPTII,S$GLB,, | Performed by: SURGERY

## 2024-10-02 PROCEDURE — 99999 PR PBB SHADOW E&M-EST. PATIENT-LVL III: CPT | Mod: PBBFAC,,, | Performed by: SURGERY

## 2024-10-02 PROCEDURE — 3288F FALL RISK ASSESSMENT DOCD: CPT | Mod: CPTII,S$GLB,, | Performed by: SURGERY

## 2024-10-02 PROCEDURE — 99213 OFFICE O/P EST LOW 20 MIN: CPT | Mod: S$GLB,,, | Performed by: SURGERY

## 2024-10-02 PROCEDURE — 1159F MED LIST DOCD IN RCRD: CPT | Mod: CPTII,S$GLB,, | Performed by: SURGERY

## 2024-10-02 PROCEDURE — 3075F SYST BP GE 130 - 139MM HG: CPT | Mod: CPTII,S$GLB,, | Performed by: SURGERY

## 2024-10-02 NOTE — PROGRESS NOTES
Patient returns.  She is she is well known to the service very pleasant patient.  They just back from a trip to [place] where they went to [place] and had a marvelous time.  She at this point is doing well she can walk as far she wants she has no problems.  She has a 1+ to 2+ DP in her left leg in her right leg it is 0 to 1+.  She has no claudication she had a stent placed previously in her SFA.  And she has a proximally 50% stenosis there she starts pre stent 185 is velocity it goes to 317 and then post it goes to 144.  I guess that is a proximally 50 maybe 60% but she has got a good pulse distally.  On the contralateral side she had studies which show a 70-90% stenosis of her of her SFA on that side but she has not no symptoms I would not intervene at this point.  She is on Plavix and aspirin.  I would ask her to continue that.  I would like to keep a close eye on this I would like to have her return in 3 months with a repeat duplex scan.  I told her if she suddenly has pain in that leg walking that she should call us come to the ER and that we can clean it out and fix it.  I do not believe that she will need that as it has been stable for about 50% previously.  However we will keep a close eye on her.  All of their questions were answered it is a pleasure to see her back.      In addition to that she said she has a occasional foul-smelling urine and discoloration.  She is going to see her primary care doctor in 2 weeks.  Therefore we will send a UA and a urine C&S for her so that her primary care doctor has a information when the patient is seen.  I have told him to please reach out to her primary care doctor that I am not the person who follows up on this but we are doing this in preparation for her appointment with the primary care doctor she understands and agrees

## 2024-10-02 NOTE — TELEPHONE ENCOUNTER
Attempted to contact patient in response to message. Voice message left for patient requesting return call.   ----- Message from [employer] sent at 10/2/2024  1:09 PM CDT -----  .Type:  Needs Medical Advice    Who Called: pt    Would the patient rather a call back or a response via [employer]? Call back  Best Call Back Number: [phone]  Additional Information:     Pt stated she missed a call and would like a call back

## 2024-10-07 ENCOUNTER — LAB VISIT (OUTPATIENT)
Dept: LAB | Facility: HOSPITAL | Age: 79
End: 2024-10-07
Attending: SURGERY
Payer: MEDICARE

## 2024-10-07 DIAGNOSIS — R82.90 FOUL SMELLING URINE: ICD-10-CM

## 2024-10-07 LAB
BILIRUB UR QL STRIP: NEGATIVE
CLARITY UR REFRACT.AUTO: CLEAR
COLOR UR AUTO: YELLOW
GLUCOSE UR QL STRIP: NEGATIVE
HGB UR QL STRIP: NEGATIVE
KETONES UR QL STRIP: NEGATIVE
LEUKOCYTE ESTERASE UR QL STRIP: NEGATIVE
NITRITE UR QL STRIP: NEGATIVE
PH UR STRIP: 7 [PH] (ref 5–8)
PROT UR QL STRIP: NEGATIVE
SP GR UR STRIP: 1.01 (ref 1–1.03)
URN SPEC COLLECT METH UR: NORMAL

## 2024-10-07 PROCEDURE — 81003 URINALYSIS AUTO W/O SCOPE: CPT | Performed by: SURGERY

## 2024-10-07 PROCEDURE — 87086 URINE CULTURE/COLONY COUNT: CPT | Performed by: SURGERY

## 2024-10-08 ENCOUNTER — OFFICE VISIT (OUTPATIENT)
Dept: FAMILY MEDICINE | Facility: CLINIC | Age: 79
End: 2024-10-08
Payer: MEDICARE

## 2024-10-08 VITALS
HEART RATE: 79 BPM | OXYGEN SATURATION: 79 % | BODY MASS INDEX: 27.37 KG/M2 | HEIGHT: 67 IN | SYSTOLIC BLOOD PRESSURE: 132 MMHG | DIASTOLIC BLOOD PRESSURE: 74 MMHG | WEIGHT: 174.38 LBS

## 2024-10-08 DIAGNOSIS — I73.9 PERIPHERAL ARTERIAL DISEASE WITH HISTORY OF REVASCULARIZATION: ICD-10-CM

## 2024-10-08 DIAGNOSIS — E66.3 OVERWEIGHT WITH BODY MASS INDEX (BMI) OF 27 TO 27.9 IN ADULT: ICD-10-CM

## 2024-10-08 DIAGNOSIS — M79.10 MYALGIA DUE TO STATIN: ICD-10-CM

## 2024-10-08 DIAGNOSIS — T46.6X5A MYALGIA DUE TO STATIN: ICD-10-CM

## 2024-10-08 DIAGNOSIS — Z23 IMMUNIZATION DUE: ICD-10-CM

## 2024-10-08 DIAGNOSIS — Z12.31 ENCOUNTER FOR SCREENING MAMMOGRAM FOR BREAST CANCER: ICD-10-CM

## 2024-10-08 DIAGNOSIS — Z13.820 ENCOUNTER FOR OSTEOPOROSIS SCREENING IN ASYMPTOMATIC POSTMENOPAUSAL PATIENT: ICD-10-CM

## 2024-10-08 DIAGNOSIS — E78.2 MIXED HYPERLIPIDEMIA: Primary | ICD-10-CM

## 2024-10-08 DIAGNOSIS — E03.8 SUBCLINICAL HYPOTHYROIDISM: ICD-10-CM

## 2024-10-08 DIAGNOSIS — Z98.890 PERIPHERAL ARTERIAL DISEASE WITH HISTORY OF REVASCULARIZATION: ICD-10-CM

## 2024-10-08 DIAGNOSIS — Z78.0 ENCOUNTER FOR OSTEOPOROSIS SCREENING IN ASYMPTOMATIC POSTMENOPAUSAL PATIENT: ICD-10-CM

## 2024-10-08 DIAGNOSIS — Z79.890 HORMONE REPLACEMENT THERAPY (HRT): ICD-10-CM

## 2024-10-08 LAB — BACTERIA UR CULT: NORMAL

## 2024-10-08 PROCEDURE — 90677 PCV20 VACCINE IM: CPT | Mod: S$GLB,,, | Performed by: FAMILY MEDICINE

## 2024-10-08 PROCEDURE — 99214 OFFICE O/P EST MOD 30 MIN: CPT | Mod: S$GLB,,, | Performed by: FAMILY MEDICINE

## 2024-10-08 PROCEDURE — 3078F DIAST BP <80 MM HG: CPT | Mod: CPTII,S$GLB,, | Performed by: FAMILY MEDICINE

## 2024-10-08 PROCEDURE — 99999 PR PBB SHADOW E&M-EST. PATIENT-LVL IV: CPT | Mod: PBBFAC,,, | Performed by: FAMILY MEDICINE

## 2024-10-08 PROCEDURE — 3075F SYST BP GE 130 - 139MM HG: CPT | Mod: CPTII,S$GLB,, | Performed by: FAMILY MEDICINE

## 2024-10-08 PROCEDURE — G0009 ADMIN PNEUMOCOCCAL VACCINE: HCPCS | Mod: S$GLB,,, | Performed by: FAMILY MEDICINE

## 2024-10-08 PROCEDURE — 1159F MED LIST DOCD IN RCRD: CPT | Mod: CPTII,S$GLB,, | Performed by: FAMILY MEDICINE

## 2024-10-08 PROCEDURE — 1126F AMNT PAIN NOTED NONE PRSNT: CPT | Mod: CPTII,S$GLB,, | Performed by: FAMILY MEDICINE

## 2024-10-08 PROCEDURE — 3288F FALL RISK ASSESSMENT DOCD: CPT | Mod: CPTII,S$GLB,, | Performed by: FAMILY MEDICINE

## 2024-10-08 PROCEDURE — G2211 COMPLEX E/M VISIT ADD ON: HCPCS | Mod: S$GLB,,, | Performed by: FAMILY MEDICINE

## 2024-10-08 PROCEDURE — 1160F RVW MEDS BY RX/DR IN RCRD: CPT | Mod: CPTII,S$GLB,, | Performed by: FAMILY MEDICINE

## 2024-10-08 PROCEDURE — 1101F PT FALLS ASSESS-DOCD LE1/YR: CPT | Mod: CPTII,S$GLB,, | Performed by: FAMILY MEDICINE

## 2024-10-08 RX ORDER — ROSUVASTATIN CALCIUM 20 MG/1
20 TABLET, COATED ORAL NIGHTLY
Qty: 90 TABLET | Refills: 3 | Status: SHIPPED | OUTPATIENT
Start: 2024-10-08

## 2024-10-08 NOTE — PROGRESS NOTES
"Subjective:         Patient ID: Mireille Akins is a 79 y.o. female.    Chief Complaint: Medication Problem    Patient Active Problem List   Diagnosis    Renal mass, right    Hormone replacement therapy (HRT)    Angiomyolipoma    Overweight with body mass index (BMI) of 27 to 27.9 in adult    Stress due to illness of family member-  - newly dx as diabetic-     Subclinical hypothyroidism    Peripheral arterial disease with history of revascularization    Mixed hyperlipidemia    Myalgia due to statin      HPI    Mireille is a 79 y.o. female who presents today for concern over her current medications.     Atorvastatin - body aches, nervousness.   1 week after stopping, symptoms resolved. Has been off for weeks.     The 10-year ASCVD risk score (Radha SOLIS, et al., 2019) is: 21.5%    Values used to calculate the score:      Age: 79 years      Sex: Female      Is Non- : No      Diabetic: No      Tobacco smoker: No      Systolic Blood Pressure: 132 mmHg      Is BP treated: No      HDL Cholesterol: 82 mg/dL      Total Cholesterol: 189 mg/dL    Review of Systems   All other systems reviewed and are negative.       Objective:     Vitals:    10/08/24 0745   BP: 132/74   BP Location: Left arm   Patient Position: Sitting   Pulse: 79   SpO2: (!) 79%   Weight: 79.1 kg (174 lb 6.1 oz)   Height: 5' 7" (1.702 m)         Physical Exam  Vitals and nursing note reviewed.   Constitutional:       General: She is not in acute distress.     Appearance: Normal appearance. She is not ill-appearing, toxic-appearing or diaphoretic.   HENT:      Head: Normocephalic and atraumatic.   Eyes:      General: No scleral icterus.     Conjunctiva/sclera: Conjunctivae normal.   Cardiovascular:      Rate and Rhythm: Normal rate.      Heart sounds: Normal heart sounds. No murmur heard.  Pulmonary:      Effort: Pulmonary effort is normal. No respiratory distress.   Skin:     Coloration: Skin is not pale. "   Neurological:      Mental Status: She is alert. Mental status is at baseline.   Psychiatric:         Attention and Perception: Attention and perception normal.         Mood and Affect: Mood and affect normal.         Speech: Speech normal.         Behavior: Behavior normal.         Cognition and Memory: Cognition and memory normal.         Judgment: Judgment normal.       Assessment:       1. Mixed hyperlipidemia    2. Myalgia due to statin    3. Peripheral arterial disease with history of revascularization    4. Hormone replacement therapy (HRT)    5. Overweight with body mass index (BMI) of 27 to 27.9 in adult    6. Subclinical hypothyroidism    7. Encounter for osteoporosis screening in asymptomatic postmenopausal patient    8. Immunization due    9. Encounter for screening mammogram for breast cancer        Plan:   Recent relevant labs results reviewed with patient.         1. Mixed hyperlipidemia  2. Myalgia due to statin  -     rosuvastatin (CRESTOR) 20 MG tablet; Take 1 tablet (20 mg total) by mouth every evening.  Dispense: 90 tablet; Refill: 3  Change atorvastatin to rosuvastatin    3. Peripheral arterial disease with history of revascularization  Per vascular  Continue ASA and plavix    4. Hormone replacement therapy (HRT)  Per GYN    5. Overweight with body mass index (BMI) of 27 to 27.9 in adult  Stable    6. Subclinical hypothyroidism  TSH normalized    7. Encounter for osteoporosis screening in asymptomatic postmenopausal patient  Ordered to be done at [facility]    8. Immunization due  -     pneumoc 20-barber conj-dip cr(PF) (PREVNAR-20 (PF)) injection Syrg 0.5 mL    Patient's questions answered. Plan reviewed with patient at the end of visit. Relevant precautions to chief complaint and reasons to seek further medical care or to contact the office sooner reviewed with patient.     Follow up in about 6 months (around 4/8/2025) for HLD f/u , - already scheduled.        Part of this note was dictated using voice  recognition software. Please excuse any typographical errors.

## 2024-10-15 ENCOUNTER — PATIENT MESSAGE (OUTPATIENT)
Dept: SPORTS MEDICINE | Facility: CLINIC | Age: 79
End: 2024-10-15
Payer: MEDICARE

## 2024-10-15 DIAGNOSIS — M25.551 BILATERAL HIP PAIN: Primary | ICD-10-CM

## 2024-10-15 DIAGNOSIS — M25.552 BILATERAL HIP PAIN: Primary | ICD-10-CM

## 2024-11-14 ENCOUNTER — HOSPITAL ENCOUNTER (OUTPATIENT)
Dept: RADIOLOGY | Facility: HOSPITAL | Age: 79
Discharge: HOME | End: 2024-11-14
Attending: NEUROMUSCULOSKELETAL MEDICINE & OMM
Payer: MEDICARE

## 2024-11-14 ENCOUNTER — OFFICE VISIT (OUTPATIENT)
Dept: SPORTS MEDICINE | Facility: CLINIC | Age: 79
End: 2024-11-14
Payer: MEDICARE

## 2024-11-14 VITALS — HEART RATE: 76 BPM | SYSTOLIC BLOOD PRESSURE: 140 MMHG | DIASTOLIC BLOOD PRESSURE: 75 MMHG

## 2024-11-14 DIAGNOSIS — M67.951 TENDINOPATHY OF RIGHT GLUTEAL REGION: ICD-10-CM

## 2024-11-14 DIAGNOSIS — M25.551 LATERAL PAIN OF RIGHT HIP: Primary | ICD-10-CM

## 2024-11-14 DIAGNOSIS — M25.551 BILATERAL HIP PAIN: ICD-10-CM

## 2024-11-14 DIAGNOSIS — M79.10 MYALGIA: ICD-10-CM

## 2024-11-14 DIAGNOSIS — M99.06 SOMATIC DYSFUNCTION OF LOWER EXTREMITY: ICD-10-CM

## 2024-11-14 DIAGNOSIS — M25.552 BILATERAL HIP PAIN: ICD-10-CM

## 2024-11-14 DIAGNOSIS — M99.05 SOMATIC DYSFUNCTION OF PELVIC REGION: ICD-10-CM

## 2024-11-14 DIAGNOSIS — M99.03 SOMATIC DYSFUNCTION OF LUMBAR REGION: ICD-10-CM

## 2024-11-14 DIAGNOSIS — M99.02 SOMATIC DYSFUNCTION OF THORACIC REGION: ICD-10-CM

## 2024-11-14 DIAGNOSIS — M99.04 SACRAL REGION SOMATIC DYSFUNCTION: ICD-10-CM

## 2024-11-14 PROCEDURE — 99999 PR PBB SHADOW E&M-EST. PATIENT-LVL III: CPT | Mod: PBBFAC,,, | Performed by: NEUROMUSCULOSKELETAL MEDICINE & OMM

## 2024-11-14 PROCEDURE — 73502 X-RAY EXAM HIP UNI 2-3 VIEWS: CPT | Mod: 26,RT,, | Performed by: RADIOLOGY

## 2024-11-14 PROCEDURE — 73502 X-RAY EXAM HIP UNI 2-3 VIEWS: CPT | Mod: TC,PO,RT

## 2024-11-14 NOTE — PROGRESS NOTES
Subjective:     Mireille Akins     Chief Complaint   Patient presents with    Left Hip - Pain    Right Hip - Pain     HPI    Mireille is a 79 y.o. female coming in today for left hip pain, and now right hip pain. Since last visit the pain has Improved in the left but deteriorated in the right with recent increase walking on [place].  Pt is compliant with HEP. The pain is better with rest, HEP and worse with nighttime, rolling over in bed, lying flat. Pt. describes the pain as a 0/10 currently but does experience significant pain at night that disturbs her sleep. There has not been any new a fall/injury/ or traumas since last visit.  Pt. denies any new musculoskeletal complaints at this time.     Office note from 24 reviewed    Joint instability? no  Mechanical locking/clicking? no  Affecting ADL's? yes  Affecting sleep? yes    Occupation: retired    PAST MEDICAL HISTORY:   Past Medical History:   Diagnosis Date    Angiomyolipoma 2022    Asthma     last attack 15 years ago    PONV (postoperative nausea and vomiting)     Subclinical hypothyroidism      PAST SURGICAL HISTORY:   Past Surgical History:   Procedure Laterality Date    ANGIOGRAM, EXTREMITY, UNILATERAL Right 2024    Procedure: ANGIOGRAM, EXTREMITY, UNILATERAL, PTA ,;  Surgeon: Teo Murry MD;  Location: [facility] OR [address] [place] [address];  Service: Vascular;  Laterality: Right;  8.5 min  355.30 mGy  73.2661 Gy.cm  96ml Dye    BACK SURGERY       SECTION      CHOLECYSTECTOMY      HYSTERECTOMY      knee scoped      rotator cuff surgery Right     STENT, SUPERFICIAL FEMORAL ARTERY Right 2024    Procedure: STENT, SUPERFICIAL FEMORAL ARTERY;  Surgeon: Teo Murry MD;  Location: [facility] OR [address] [place] [address];  Service: Vascular;  Laterality: Right;    TONSILLECTOMY       FAMILY HISTORY:   Family History   Problem Relation Name Age of Onset    Heart disease Mother      Prostate cancer Neg Hx      Kidney disease Neg Hx      Breast cancer Neg  Hx      Colon cancer Neg Hx      Ovarian cancer Neg Hx       SOCIAL HISTORY:   Social History     Socioeconomic History    Marital status:    Tobacco Use    Smoking status: Former     Types: Cigarettes     Start date: 6/2/1965     Passive exposure: Past    Smokeless tobacco: Never    Tobacco comments:     quit at age 20   Substance and Sexual Activity    Alcohol use: Not Currently    Drug use: No    Sexual activity: Yes     Partners: Male     Birth control/protection: See Surgical Hx     Social Drivers of Health     Financial Resource Strain: Low Risk  (9/6/2022)    Overall Financial Resource Strain (CARDIA)     Difficulty of Paying Living Expenses: Not hard at all   Food Insecurity: No Food Insecurity (9/6/2022)    Hunger Vital Sign     Worried About Running Out of Food in the Last Year: Never true     Ran Out of Food in the Last Year: Never true   Transportation Needs: No Transportation Needs (9/6/2022)    PRAPARE - Transportation     Lack of Transportation (Medical): No     Lack of Transportation (Non-Medical): No   Physical Activity: Sufficiently Active (8/10/2020)    Exercise Vital Sign     Days of Exercise per Week: 3 days     Minutes of Exercise per Session: 60 min   Stress: Stress Concern Present (9/6/2022)    [employer] - Occupational Stress Questionnaire     Feeling of Stress : Very much   Housing Stability: Unknown (9/6/2022)    Housing Stability Vital Sign     Unable to Pay for Housing in the Last Year: No     Unstable Housing in the Last Year: No     MEDICATIONS:   Current Outpatient Medications:     aspirin (ECOTRIN) 81 MG EC tablet, Take 1 tablet (81 mg total) by mouth once daily., Disp: 360 tablet, Rfl: 0    clopidogreL (PLAVIX) 75 mg tablet, Take 1 tablet (75 mg total) by mouth once daily., Disp: 90 tablet, Rfl: 3    estradioL (ESTRACE) 2 MG tablet, Take 1 tablet (2 mg total) by mouth once daily., Disp: 90 tablet, Rfl: 3    MULTIVITAMIN W-MINERALS/LUTEIN (CENTRUM  SILVER ORAL), Take 1 tablet by mouth once daily., Disp: , Rfl:     pantoprazole (PROTONIX) 20 MG tablet, Take 1 tablet (20 mg total) by mouth once daily., Disp: 90 tablet, Rfl: 3    glucosamine/chondr booth A sod (OSTEO BI-FLEX ORAL), Take by mouth. (Patient not taking: Reported on 11/14/2024), Disp: , Rfl:     rosuvastatin (CRESTOR) 20 MG tablet, Take 1 tablet (20 mg total) by mouth every evening. (Patient not taking: Reported on 11/14/2024), Disp: 90 tablet, Rfl: 3    ALLERGIES: Review of patient's allergies indicates:  No Known Allergies    Objective:     VITAL SIGNS: BP (!) 140/75 (Patient Position: Sitting)   Pulse 76   LMP  (LMP Unknown)    General    Vitals reviewed.  Constitutional: She is oriented to person, place, and time. She appears well-developed and well-nourished.   Neurological: She is alert and oriented to person, place, and time.   Psychiatric: She has a normal mood and affect. Her behavior is normal.         MUSCULOSKELETAL EXAM  HIP: right HIP  The affected hip is compared to the contralateral hip.    Observation:    There is no edema, erythema, or ecchymosis in the lumbosacral region.   There is no Trendelenburg sign on either side  No obvious pelvic obliquity while standing   No thoracolumbar scoliosis observed.    No midline skin abnormalities.  No atrophy noted in the lower limbs.  Gait: Non-antalgic with Neutral ankle mechanics and Neutral medial arch  Improved bilateral pelvic stability but still with some bilateral hip hiking compensatory pattern noted with single leg raise    ROM (* = with pain):  Passive hip flexion to 120° on left and 120° on right  Passive hip internal rotation to 45° on left and 45° on right  Passive hip external rotation to 45° on left and 45° on right   Passive hip abduction to 45° on left and 45° on right  All motions above are without pain.    Tenderness To Palpation:  No tenderness at the ASIS, AIIS, PSIS, PIIS, iliac crest, pubic bones, ischial tuberosity.  No  tenderness throughout the lumbar spine, iliolumbar region, or posterior pelvis.  No tenderness throughout the sacrum   No deep hip external rotator muscular tenderness on left  + tenderness over the greater trochanteric bursa on the right  + tenderness at the glut attachments on the greater trochanter on right  No tenderness over proximal IT band or hip flexor musculature.    Strength Testing (* = with pain):  Hip flexion - 5/5 on left and 5/5 on right  Hip extension - 5/5 on left and 5/5 on right  Hip adduction - 5/5 on left and 5/5 on right  Hip abduction - 5/5 on left and 5/5 on right  Knee flexion - 5/5 on left and 5/5 on right  Knee extension - 5/5 on left and 5/5 on right  Glutaeus medius - 5/5 on left and 5/5 on right    Special Tests:  Standing Trendelenburg test - negative    Seated straight leg raise - negative  Supine straight leg raise - negative  Hamstring flexibility symmetric    Log roll - negative  FLOR - negative  FADIR - negative  Scour test - negative  No pain with posterior hip capsule compression    ASIS compression test - negative  SI drawer test - negative   Thigh thrust test - negative     Piriformis test (Bonnet's) - negative  Ely's test - negative  Quadriceps flexibility symmetric.  Nicol test - negative  Sergio compression test - negative    Fulcrum test - negative    Leg lengths symmetric following OMT to the pelvis.     Neurovascular Exam:  Normal gait without Trendelenburg or antalgia.  2+ femoral, DP, and PT pulses BL.  No skin changes, no abnormal hair distribution.  Sensation intact to light touch throughout the obturator and medial/lateral/posterior femoral cutaneous nerves.  Capillary refill intact to <2 seconds in all lower limb digits.    TART (Tissue texture abnormality, Asymmetry,  Restriction of motion and/or Tenderness) changes:     Thoracic Spine   T1 Neutral   T2 Neutral   T3 Neutral   T4 Neutral   T5 Neutral   T6 Neutral   T7 Neutral   T8 Neutral   T9 Neutral   T10 Neutral    T11 NS-left,R-right   T12 NS-left,R-right     Rib cage: Neutral     Lumbar Spine   L1 NS-left,R-right   L2 NS-left,R-right   L3 Neutral   L4 FRS RIGHT   L5 FRS RIGHT     Pelvis:  Innominate:Left superior shear  Pubic bone:Left superior pubic shear    Sacrum:Left on Right sacral torsion    Lower extremity: right lateral hip Herniated trigger point (HTP) fascial distortion      Key   F= Flexed   E = Extended   R = Rotated   S = Sidebent   TTA = tissue texture abnormality     IMAGIN. X-ray ordered due to right hip pain. (AP pelvis and frogleg lateral  right views) taken today.   2. X-ray images were reviewed personally by me and then directly with patient.  3. FINDINGS: X-ray images obtained demonstrate mild degenerative osteoarthritis of the hips with mild joint space narrowing, sclerosis and marginal osteophyte formation. Alignment is maintained. There are degenerative changes of the lower lumbar spine. Surgical clips over the right lower quadrant. Sacroiliac joints are symmetric.   4. IMPRESSION:  Mild bilateral hip DJD changes.  Mild degenerative changes of the lower lumbar spine also noted.    Assessment:      Encounter Diagnoses   Name Primary?    Lateral pain of right hip Yes    Tendinopathy of right gluteal region     Myalgia     Somatic dysfunction of lumbar region     Sacral region somatic dysfunction     Somatic dysfunction of pelvic region     Somatic dysfunction of lower extremity     Somatic dysfunction of thoracic region         Plan:   1. RIGHT lateral hip pain secondary to weak gluteal muscles and poor pelvic/core stability with compensatory muscle firing patterns.  Underlying bilateral hip DJD changes noted on previous x-rays, however no significant intra-articular symptoms appreciated on physical exam.   - Continue over-the-counter Tylenol and Ice up to 20 minutes at a time prn for pain control.  Recommend avoiding NSAIDs on Plavix.  - OMT performed again today to address biomechanical  contributions to pain  - HEP reviewed  - discussed option of ultrasound-guided right GTB CSI and formal physical therapy as next steps if symptoms persist  -  X-ray images of left hip taken 8/15/24 (AP pelvis and frogleg lateral  left views) showed Mild bilateral hip DJD changes.  Lower lumbar degenerative changes also noted.   - X-ray images of right hip taken today (AP pelvis and frogleg lateral  right views) showed mild bilateral hip DJD changes.  Mild degenerative changes of the lower lumbar spine also noted. Images were personally reviewed with patient.    2. OMT 5-6 regions. Oral consent obtained.  Reviewed benefits and potential side effects, including bruising at site of treatment and increased soreness for the next 24-48 hours. Pt. Instructed to increased water intake by 1 L today and tomorrow to help with any residual soreness.   - OMT indicated today due to signs and symptoms as well as local and remote somatic dysfunction findings and their related neurokinetic, lymphatic, fascial and/or arteriovenous body connections.   - OMT techniques used: Myofascial Release, Muscle Energy, and Fascial Distortion Model   - Treatment was tolerated well. Improvement noted in segmental mobility post-treatment in dysfunctional regions. There were no adverse events and no complications immediately following treatment.     3. Continue the following HEP:  A)  Pelvic clock exercises given to do from the 6-12 o'clock positions:10-15 reps, twice daily. Hand out of exercise also given.   B) Clam shell exercises bilaterally: hold leg in abducted and externally rotated position for 5-10 seconds, repeat 5-10 times  C) Lower abdominal muscle isotonic exercises: Rotate pelvis into the 6 o'clock position and holding it to engage lower abdominal muscles. Then lift up leg, one at a time, alternating for 10-15 reps. Repeat exercises 1-2 times daily. Handout given.   Discontinue until pain improves:  E) Abduction resistance band squats:  10-15 reps, 3-4 times a week  F) Abduction resistant band walks:10-15 reps, work up to 3 sets, 3 times a week    [address] HOME EXERCISE PROGRAM (HEP):  The patient was taught a homegoing physical therapy regimen as described above. The patient demonstrated understanding of the exercises and proper technique of their execution. This interaction took 15 minutes.     4. Follow-up in 3-4 weeks for reevaluation    5. Patient agreeable to today's plan and all questions were answered    This note is dictated using the M*Modal Fluency Direct word recognition program. There are word recognition mistakes that are occasionally missed on review.

## 2024-11-15 ENCOUNTER — OFFICE VISIT (OUTPATIENT)
Dept: CARDIOLOGY | Facility: CLINIC | Age: 79
End: 2024-11-15
Payer: MEDICARE

## 2024-11-15 ENCOUNTER — TELEPHONE (OUTPATIENT)
Dept: CARDIOLOGY | Facility: CLINIC | Age: 79
End: 2024-11-15
Payer: MEDICARE

## 2024-11-15 VITALS
WEIGHT: 169.56 LBS | DIASTOLIC BLOOD PRESSURE: 77 MMHG | HEIGHT: 66 IN | BODY MASS INDEX: 27.25 KG/M2 | SYSTOLIC BLOOD PRESSURE: 144 MMHG | HEART RATE: 79 BPM

## 2024-11-15 DIAGNOSIS — M79.10 MYALGIA DUE TO STATIN: ICD-10-CM

## 2024-11-15 DIAGNOSIS — E78.2 MIXED HYPERLIPIDEMIA: ICD-10-CM

## 2024-11-15 DIAGNOSIS — T46.6X5A MYALGIA DUE TO STATIN: ICD-10-CM

## 2024-11-15 DIAGNOSIS — Z98.890 PERIPHERAL ARTERIAL DISEASE WITH HISTORY OF REVASCULARIZATION: Primary | ICD-10-CM

## 2024-11-15 DIAGNOSIS — I73.9 PERIPHERAL ARTERIAL DISEASE WITH HISTORY OF REVASCULARIZATION: Primary | ICD-10-CM

## 2024-11-15 DIAGNOSIS — E66.3 OVERWEIGHT WITH BODY MASS INDEX (BMI) OF 27 TO 27.9 IN ADULT: ICD-10-CM

## 2024-11-15 PROCEDURE — 99999 PR PBB SHADOW E&M-EST. PATIENT-LVL III: CPT | Mod: PBBFAC,,, | Performed by: INTERNAL MEDICINE

## 2024-11-15 NOTE — TELEPHONE ENCOUNTER
----- Message from Belen sent at 11/15/2024  3:06 PM CST -----  Regarding: return call  Pt is returning your call and can be reached at [phone]. She thinks it may be regarding an appt.   I do not see any notes to know what it is regarding.      Thank you

## 2024-11-15 NOTE — PATIENT INSTRUCTIONS
Assessment/Plan:  Mireille Akins is a 79 y.o. female with PAD s/p right SFA PTA/stent placement, overweight, who presents for a follow up appointment.    PAD s/p right SFA PTA/stent placement- Mrs. Akins reports no claudication symptoms or tissue loss. She was unable to tolerate statins due to muscle pain. BLE Arterial Ultrasound on 9/10/2024 revealed patent prox/mid SFA stent with doubling of the velocity noted suggesting more than 50% stenosis in the mid right SFA. Hemodynamically significant stenosis in the mid left SFA more than 50%, significant hemodynamic stenosis in the distal left SFA 70-90%. Occluded left anterior tibial artery, collaterals noted to the left DP. Continue walking program with goal of at least 30 minutes a day 5 days per week.Follow up with repeat ultrasound in 3 months with Dr. Murry. Given statin intolerance, will start bempedoic acid-zetia.     2. Overweight- Encourage diet, exercise, and weight loss.    Follow up in 3 months with lipids prior

## 2024-11-15 NOTE — PROGRESS NOTES
[facility]      Chief Complaint   Patient presents with    Peripheral arterial disease with history of revascularizati       Patient ID: Mireille Akins is a 79 y.o. female with PAD s/p right SFA PTA/stent placement, overweight, who presents for a follow up appointment. Pertinent history/events are as follows:     -Pt kindly referred by Dr. Cedeno for evaluation of Peripheral arterial disease.    -At our initial clinic visit on 5/1/2024, Mrs. Akins reports no claudication symptoms or tissue loss.  She reports significant bruising of arms and legs since starting ASA and plavix.  She reports no rash.    Plan:  PAD s/p right SFA PTA/stent placement- Mrs. Akins reports no claudication symptoms or tissue loss.  She reports significant bruising of arms and legs since starting ASA and plavix.  She reports no rash. Pt to start walking program with goal of at least 30 minutes a day 5 days per week.  Continue ASA/Plavix and atorvastatin 40 mg daily.  Check updated lipids with goal LDL of <70.  Follow up with Dr. Murry as scheduled.    Overweight- Encourage diet, exercise, and weight loss.    HPI:  Mrs. Akins reports no claudication symptoms or tissue loss.  She reports a recent trip to [place] where she did a lot of walking. She was unable to tolerate statins due to muscle pain. BLE Arterial Ultrasound on 9/10/2024 revealed patent prox/mid SFA stent with doubling of the velocity noted suggesting more than 50% stenosis in the mid right SFA. Hemodynamically significant stenosis in the mid left SFA more than 50%, significant hemodynamic stenosis in the distal left SFA 70-90%. Occluded left anterior tibial artery, collaterals noted to the left DP.    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    Asthma     last attack 15 years ago    PONV (postoperative nausea and vomiting)     Subclinical hypothyroidism      Past Surgical History:   Procedure Laterality Date    ANGIOGRAM,  EXTREMITY, UNILATERAL Right 2024    Procedure: ANGIOGRAM, EXTREMITY, UNILATERAL, PTA ,;  Surgeon: Teo Murry MD;  Location: [facility] OR [address] [place] [address];  Service: Vascular;  Laterality: Right;  8.5 min  355.30 mGy  73.2661 Gy.cm  96ml Dye    BACK SURGERY       SECTION      CHOLECYSTECTOMY      HYSTERECTOMY      knee scoped      rotator cuff surgery Right     STENT, SUPERFICIAL FEMORAL ARTERY Right 2024    Procedure: STENT, SUPERFICIAL FEMORAL ARTERY;  Surgeon: Teo Murry MD;  Location: [facility] OR [address] [place] [address];  Service: Vascular;  Laterality: Right;    TONSILLECTOMY       Social History     Socioeconomic History    Marital status:    Tobacco Use    Smoking status: Former     Types: Cigarettes     Start date: 1965     Passive exposure: Past    Smokeless tobacco: Never    Tobacco comments:     quit at age 20   Substance and Sexual Activity    Alcohol use: Not Currently    Drug use: No    Sexual activity: Yes     Partners: Male     Birth control/protection: See Surgical Hx     Social Drivers of Health     Financial Resource Strain: Low Risk  (2022)    Overall Financial Resource Strain (CARDIA)     Difficulty of Paying Living Expenses: Not hard at all   Food Insecurity: No Food Insecurity (2022)    Hunger Vital Sign     Worried About Running Out of Food in the Last Year: Never true     Ran Out of Food in the Last Year: Never true   Transportation Needs: No Transportation Needs (2022)    PRAPARE - Transportation     Lack of Transportation (Medical): No     Lack of Transportation (Non-Medical): No   Physical Activity: Sufficiently Active (8/10/2020)    Exercise Vital Sign     Days of Exercise per Week: 3 days     Minutes of Exercise per Session: 60 min   Stress: Stress Concern Present (2022)    [employer] - Occupational Stress Questionnaire     Feeling of Stress : Very much   Housing Stability: Unknown (2022)    Housing Stability Vital Sign     Unable  "to Pay for Housing in the Last Year: No     Unstable Housing in the Last Year: No     Family History   Problem Relation Name Age of Onset    Heart disease Mother      Prostate cancer Neg Hx      Kidney disease Neg Hx      Breast cancer Neg Hx      Colon cancer Neg Hx      Ovarian cancer Neg Hx         Review of patient's allergies indicates:  No Known Allergies    Medication List with Changes/Refills   Current Medications    ASPIRIN (ECOTRIN) 81 MG EC TABLET    Take 1 tablet (81 mg total) by mouth once daily.    CLOPIDOGREL (PLAVIX) 75 MG TABLET    Take 1 tablet (75 mg total) by mouth once daily.    ESTRADIOL (ESTRACE) 2 MG TABLET    Take 1 tablet (2 mg total) by mouth once daily.    MULTIVITAMIN W-MINERALS/LUTEIN (CENTRUM SILVER ORAL)    Take 1 tablet by mouth once daily.    PANTOPRAZOLE (PROTONIX) 20 MG TABLET    Take 1 tablet (20 mg total) by mouth once daily.   Discontinued Medications    GLUCOSAMINE/CHONDR LIU A SOD (OSTEO BI-FLEX ORAL)    Take by mouth.    ROSUVASTATIN (CRESTOR) 20 MG TABLET    Take 1 tablet (20 mg total) by mouth every evening.       Review of Systems  Constitution: Denies chills, fever, and sweats.  HENT: Denies headaches or blurry vision.  Cardiovascular: Denies chest pain or irregular heart beat.  Respiratory: Denies cough or shortness of breath.  Gastrointestinal: Denies abdominal pain, nausea, or vomiting.  Musculoskeletal: Denies muscle cramps.  Neurological: Denies dizziness or focal weakness.  Psychiatric/Behavioral: Normal mental status.  Hematologic/Lymphatic: Denies bleeding problem or easy bruising/bleeding.  Skin: Denies rash or suspicious lesions    Physical Examination  Ht 5' 6" (1.676 m)   Wt 76.9 kg (169 lb 8.5 oz)   LMP  (LMP Unknown)   BMI 27.36 kg/m²     Constitutional: No acute distress, conversant  HEENT: Sclera anicteric, Pupils equal, round and reactive to light, extraocular motions intact, Oropharynx clear  Neck: No JVD, no carotid bruits  Cardiovascular: regular " rate and rhythm, no murmur, rubs or gallops, normal S1/S2  Pulmonary: Clear to auscultation bilaterally  Abdominal: Abdomen soft, nontender, nondistended, positive bowel sounds  Extremities: No lower extremity edema,   Pulses:  Carotid pulses are 2+ on the right side, and 2+ on the left side.  Radial pulses are 2+ on the right side, and 2+ on the left side.   Femoral pulses are 2+ on the right side, and 2+ on the left side.  Popliteal pulses are 2+ on the right side, and 2+ on the left side.   Dorsalis pedis pulses are 2+ on the right side, and 2+ on the left side.   Posterior tibial pulses are 2+ on the right side, and 2+ on the left side.    Skin: No ecchymosis, erythema, or ulcers  Psych: Alert and oriented x 3, appropriate affect  Neuro: CNII-XII intact, no focal deficits    Labs:  Most Recent Data  CBC:   Lab Results   Component Value Date    WBC 5.28 04/22/2024    HGB 13.4 04/22/2024    HCT 40.4 04/22/2024     04/22/2024    MCV 95 04/22/2024    RDW 13.2 04/22/2024     BMP:   Lab Results   Component Value Date     04/22/2024    K 4.7 04/22/2024     04/22/2024    CO2 26 04/22/2024    BUN 14 04/22/2024    CREATININE 0.8 04/22/2024     (H) 04/22/2024    CALCIUM 9.2 04/22/2024    MG 1.6 08/07/2014     LFTS;   Lab Results   Component Value Date    PROT 6.5 04/22/2024    ALBUMIN 3.3 (L) 04/22/2024    BILITOT 0.4 04/22/2024    AST 26 04/22/2024    ALKPHOS 86 04/22/2024    ALT 26 04/22/2024     COAGS:   Lab Results   Component Value Date    INR 0.9 05/29/2023     FLP:   Lab Results   Component Value Date    CHOL 189 04/22/2024    HDL 82 (H) 04/22/2024    LDLCALC 90.6 04/22/2024    TRIG 82 04/22/2024    CHOLHDL 43.4 04/22/2024     CARDIAC:   Lab Results   Component Value Date    TROPONINI 0.006 07/09/2014       Imaging:    BLE Arterial Ultrasound 9/10/2024:    Patent prox/mid SFA stent with doubling of the velocity noted suggesting more than 50% stenosis in the mid right SFA    Hemodynamically  significant stenosis in the mid left SFA more than 50%, significant hemodynamic stenosis in the distal left SFA 70-90%    Occluded left anterior tibial artery, collaterals noted to the left DP    JOHNSON Study 3/5/2024:    Right JOHNSON 0.91    Left JOHNSON 0.90    LLE Arterial Ultrasound 3/5/2024:    Diffuse arthrosclerosis    Borderline doubling velocity in the mid left SFA suggest possible 50% stenosis    Occluded left peroneal    Severe damping velocity left posterior tibial artery suggest significant stenosis     Assessment/Plan:  Mireille Akins is a 79 y.o. female with PAD s/p right SFA PTA/stent placement, overweight, who presents for a follow up appointment.    PAD s/p right SFA PTA/stent placement- Mrs. Akins reports no claudication symptoms or tissue loss. She was unable to tolerate statins due to muscle pain. BLE Arterial Ultrasound on 9/10/2024 revealed patent prox/mid SFA stent with doubling of the velocity noted suggesting more than 50% stenosis in the mid right SFA. Hemodynamically significant stenosis in the mid left SFA more than 50%, significant hemodynamic stenosis in the distal left SFA 70-90%. Occluded left anterior tibial artery, collaterals noted to the left DP. Continue walking program with goal of at least 30 minutes a day 5 days per week.Follow up with repeat ultrasound in 3 months with Dr. Murry. Given statin intolerance, will start bempedoic acid-zetia.     2. Overweight- Encourage diet, exercise, and weight loss.    Follow up in 3 months with lipids prior    Total duration of face to face visit time 30 minutes.  Total time spent counseling greater than fifty percent of total visit time.  Counseling included discussion regarding imaging findings, diagnosis, possibilities, treatment options, risks and benefits.  The patient had many questions regarding the options and long-term effects.    Fernando Mayorga MD, PhD  Interventional Cardiology

## 2024-11-27 ENCOUNTER — OFFICE VISIT (OUTPATIENT)
Dept: URGENT CARE | Facility: CLINIC | Age: 79
End: 2024-11-27
Payer: MEDICARE

## 2024-11-27 VITALS
DIASTOLIC BLOOD PRESSURE: 83 MMHG | WEIGHT: 161 LBS | RESPIRATION RATE: 18 BRPM | OXYGEN SATURATION: 97 % | SYSTOLIC BLOOD PRESSURE: 153 MMHG | HEART RATE: 78 BPM | TEMPERATURE: 97 F | HEIGHT: 66 IN | BODY MASS INDEX: 25.88 KG/M2

## 2024-11-27 DIAGNOSIS — S80.811A ABRASION OF RIGHT LOWER EXTREMITY, INITIAL ENCOUNTER: Primary | ICD-10-CM

## 2024-11-27 DIAGNOSIS — Z79.02 LONG TERM (CURRENT) USE OF ANTITHROMBOTICS/ANTIPLATELETS: ICD-10-CM

## 2024-11-27 RX ORDER — MUPIROCIN 20 MG/G
OINTMENT TOPICAL 3 TIMES DAILY
Qty: 22 G | Refills: 0 | Status: SHIPPED | OUTPATIENT
Start: 2024-11-27

## 2024-11-27 NOTE — PROGRESS NOTES
"Subjective:      Patient ID: Mireille Akins is a 79 y.o. female.    Vitals:  height is 5' 6" (1.676 m) and weight is 73 kg (161 lb). Her oral temperature is 96.7 °F (35.9 °C). Her blood pressure is 153/83 (abnormal) and her pulse is 78. Her respiration is 18 and oxygen saturation is 97%.     Chief Complaint: Leg Injury    This pt complains of right leg injury x 1 day. Pt states she hit her leg and scarped the skin off of her leg. Pt sates she is currently on blood thinners. No nausea, vomiting, diarrhea, or fever. Pt states she is not in any pain she just wants to make sure her leg  does not get infected.     Home tx: none     PMH: Pt states she is currently on blood thinners    Injury  This is a new problem. The current episode started yesterday. The problem occurs constantly. The problem has been unchanged. Pertinent negatives include no coughing, headaches, numbness, sore throat, vomiting or weakness.       HENT:  Negative for sore throat.    Respiratory:  Negative for cough.    Gastrointestinal:  Negative for vomiting.   Neurological:  Negative for headaches and numbness.      Objective:     Physical Exam   Constitutional: She is oriented to person, place, and time. She appears well-developed. She is cooperative.  Non-toxic appearance. She does not appear ill. No distress.   HENT:   Head: Normocephalic and atraumatic.   Ears:   Right Ear: Hearing, tympanic membrane, external ear and ear canal normal.   Left Ear: Hearing, tympanic membrane, external ear and ear canal normal.   Nose: Nose normal. No mucosal edema, rhinorrhea or nasal deformity. No epistaxis. Right sinus exhibits no maxillary sinus tenderness and no frontal sinus tenderness. Left sinus exhibits no maxillary sinus tenderness and no frontal sinus tenderness.   Mouth/Throat: Uvula is midline, oropharynx is clear and moist and mucous membranes are normal. No trismus in the jaw. Normal dentition. No uvula swelling. No oropharyngeal exudate, " posterior oropharyngeal edema or posterior oropharyngeal erythema.   Eyes: Conjunctivae and lids are normal. No scleral icterus.   Neck: Trachea normal and phonation normal. Neck supple. No edema present. No erythema present. No neck rigidity present.   Cardiovascular: Normal rate, regular rhythm, normal heart sounds and normal pulses.   Pulmonary/Chest: Effort normal and breath sounds normal. No respiratory distress. She has no decreased breath sounds. She has no rhonchi.   Abdominal: Normal appearance.   Musculoskeletal: Normal range of motion.         General: No deformity. Normal range of motion.   Neurological: She is alert and oriented to person, place, and time. She exhibits normal muscle tone. Coordination normal.   Skin: Skin is warm, dry, intact, not diaphoretic and not pale. Abrasions - lower ext.:  lower leg (right)       Psychiatric: Her speech is normal and behavior is normal. Judgment and thought content normal.   Nursing note and vitals reviewed.      Assessment:     1. Abrasion of right lower extremity, initial encounter    2. Long term (current) use of antithrombotics/antiplatelets        Plan:       Abrasion of right lower extremity, initial encounter  -     Laceration Repair    Long term (current) use of antithrombotics/antiplatelets    Other orders  -     mupirocin (BACTROBAN) 2 % ointment; Apply topically 3 (three) times daily.  Dispense: 22 g; Refill: 0        Silver nitrate used to cauterize wound in order to achieve hemostasis        Thank you for choosing [facility]!     Our goal in the Urgent Care is to always provide outstanding medical care. You may receive a survey by mail or e-mail in the next week regarding your experience today. We would greatly appreciate you completing and returning the survey. Your feedback provides us with a way to recognize our staff who provide very good care, and it helps us learn how to improve when your experience was below our aspiration of  excellence.       We appreciate you trusting us with your medical care. We hope you feel better soon. We will be happy to take care of you for all of your future medical needs.  You must understand that you've received an Urgent Care treatment only and that you may be released before all your medical problems are known or treated. You, the patient, will arrange for follow up care as instructed.  Follow up with your PCP or specialty clinic as directed in the next 1-2 weeks if not improved or as needed.  You can call [phone] to schedule an appointment with the appropriate provider.  Another option is to follow up with [facility] ([email or web address]) virtually for quick simple medical advice.  If your condition worsens we recommend that you receive another evaluation at the emergency room immediately or contact your primary medical clinics after hours call service to discuss your concerns.  Please return here or go to the Emergency Department for any concerns or worsening of condition.      *If you were prescribed a narcotic or controlled medication, do not drive or operate heavy equipment or machinery while taking these medications.

## 2024-11-30 ENCOUNTER — HOSPITAL ENCOUNTER (EMERGENCY)
Facility: HOSPITAL | Age: 79
Discharge: HOME | End: 2024-11-30
Attending: EMERGENCY MEDICINE
Payer: MEDICARE

## 2024-11-30 VITALS
BODY MASS INDEX: 25.71 KG/M2 | WEIGHT: 160 LBS | SYSTOLIC BLOOD PRESSURE: 145 MMHG | RESPIRATION RATE: 16 BRPM | OXYGEN SATURATION: 98 % | TEMPERATURE: 98 F | HEART RATE: 86 BPM | DIASTOLIC BLOOD PRESSURE: 68 MMHG | HEIGHT: 66 IN

## 2024-11-30 DIAGNOSIS — S80.811D ABRASION OF ANTERIOR RIGHT LOWER LEG, SUBSEQUENT ENCOUNTER: Primary | ICD-10-CM

## 2024-11-30 PROCEDURE — 63600175 PHARM REV CODE 636 W HCPCS

## 2024-11-30 PROCEDURE — 25000003 PHARM REV CODE 250

## 2024-11-30 PROCEDURE — 99282 EMERGENCY DEPT VISIT SF MDM: CPT | Mod: 25

## 2024-11-30 PROCEDURE — 12002 RPR S/N/AX/GEN/TRNK2.6-7.5CM: CPT

## 2024-11-30 RX ORDER — LIDOCAINE HYDROCHLORIDE 10 MG/ML
5 INJECTION, SOLUTION EPIDURAL; INFILTRATION; INTRACAUDAL; PERINEURAL
Status: COMPLETED | OUTPATIENT
Start: 2024-11-30 | End: 2024-11-30

## 2024-11-30 RX ADMIN — Medication: at 11:11

## 2024-11-30 RX ADMIN — LIDOCAINE HYDROCHLORIDE 50 MG: 10 INJECTION, SOLUTION EPIDURAL; INFILTRATION; INTRACAUDAL at 11:11

## 2024-11-30 NOTE — DISCHARGE INSTRUCTIONS
Ms. Akins,   Keep sutures/staples and wound clean and dry.  Avoid submersion underwater; use soap, clean water, and gentle scrubbing to clean area.  Apply a new sterile bandage when existing bandage becomes dirty or saturated.  Monitor the wound regularly for any evidence of infection, including redness, drainage, increasing pain, or fever.   Follow-up with your PCP or return to ER as directed for wound re-check and suture/staple removal.   (please return in 7-10 days for suture removal)        Thank you for letting me care for you today! It was nice meeting you, and I hope you feel better soon.   If you would like access to your chart and what was done today please utilize the [facility].   Please don't hesitate to return if your symptoms worsen or you develop any other worrisome symptoms.    Our goal in the emergency department is to always give you outstanding care and exceptional service. You may receive a survey by mail or e-mail in the next week regarding your experience in our ED. We would greatly appreciate you completing and returning the survey. Your feedback provides us with a way to recognize our staff who give very good care and it helps us learn how to improve when your experience was below our aspiration of excellence.     Sincerely,    Dayami Sykes PA-C  Emergency Department Physician Assistant  [facility] [place] and [facility]

## 2024-11-30 NOTE — ED NOTES
Pt reports slipping off a stool on Tuesday and receiving an avulsion injury to the lateral aspect of the front of the right leg. Pt states that she went to urgent care on Tuesday where wound was cauterized and dressed. Pt states that wound continues to bleed. Pt currently taking Plavix and ASA daily.

## 2024-12-01 NOTE — ED PROVIDER NOTES
Encounter Date: 2024       History     Chief Complaint   Patient presents with    Leg Injury     Pt reports sustaining avulsion to R lower leg on Tuesday. Pt was seen at Urgent care. Pt reports bleeding has not stopped since. Pt on ASA 81mg and Plavix daily.      79 old female past medical history of asthma, subclinical hypothyroidism presents to the ED for evaluation of leg wound was sustained 3 days ago.  Notes scraping side of her right lower leg with a ladder.  She denies injuries.  Patient was seen at urgent care at the day of the injury. Pt states the wound was cauterized and was sent home with topical mupirocin.  Notes wound has been intermittently bleeding, since she is on daily aspirin and Plavix.  Patient denies any increased redness, warmth, swelling to the affected leg.  Patient has been able to ambulate and bear weight on affected limb.  Patient is up-to-date with tetanus shots.  No fever, chills, nausea, vomiting.  No other acute complaints today.    The history is provided by the patient.     Review of patient's allergies indicates:  No Known Allergies  Past Medical History:   Diagnosis Date    Angiomyolipoma 2022    Asthma     last attack 15 years ago    PONV (postoperative nausea and vomiting)     Subclinical hypothyroidism      Past Surgical History:   Procedure Laterality Date    ANGIOGRAM, EXTREMITY, UNILATERAL Right 2024    Procedure: ANGIOGRAM, EXTREMITY, UNILATERAL, PTA ,;  Surgeon: Teo Murry MD;  Location: [facility] OR [address] [place] [address];  Service: Vascular;  Laterality: Right;  8.5 min  355.30 mGy  73.2661 Gy.cm  96ml Dye    BACK SURGERY       SECTION      CHOLECYSTECTOMY      HYSTERECTOMY      knee scoped      rotator cuff surgery Right     STENT, SUPERFICIAL FEMORAL ARTERY Right 2024    Procedure: STENT, SUPERFICIAL FEMORAL ARTERY;  Surgeon: Teo Murry MD;  Location: [facility] OR [address] [place] [address];  Service: Vascular;  Laterality: Right;    TONSILLECTOMY       Family History    Problem Relation Name Age of Onset    Heart disease Mother      Prostate cancer Neg Hx      Kidney disease Neg Hx      Breast cancer Neg Hx      Colon cancer Neg Hx      Ovarian cancer Neg Hx       Social History     Tobacco Use    Smoking status: Former     Types: Cigarettes     Start date: 6/2/1965     Passive exposure: Past    Smokeless tobacco: Never    Tobacco comments:     quit at age 20   Substance Use Topics    Alcohol use: Not Currently    Drug use: No     Review of Systems   Constitutional:  Negative for chills and fever.   Respiratory:  Negative for shortness of breath.    Cardiovascular:  Negative for chest pain.   Gastrointestinal:  Negative for abdominal distention, abdominal pain, nausea and vomiting.   Musculoskeletal:  Negative for neck pain and neck stiffness.   Skin:  Positive for wound.       Physical Exam     Initial Vitals [11/30/24 1102]   BP Pulse Resp Temp SpO2   (!) 145/68 86 16 97.8 °F (36.6 °C) 98 %      MAP       --         Physical Exam    Vitals reviewed.  Constitutional: She appears well-developed and well-nourished. She is not diaphoretic. No distress.   HENT:   Head: Normocephalic and atraumatic.   Eyes: EOM are normal.   Neck: Neck supple.   Cardiovascular:  Normal rate and normal heart sounds.           Pulmonary/Chest: Breath sounds normal. No respiratory distress. She has no wheezes.   Abdominal: Abdomen is soft. Bowel sounds are normal. She exhibits no distension. There is no abdominal tenderness.   Musculoskeletal:      Cervical back: Neck supple.     Neurological: She is alert and oriented to person, place, and time. GCS score is 15. GCS eye subscore is 4. GCS verbal subscore is 5. GCS motor subscore is 6.   Skin: Skin is warm. Capillary refill takes less than 2 seconds.   Superficial laceration to the right lower left, no active bleeding. Wound without approximated edges. No surrounding erythema concerning for cellulitis.    Referred to image below   Psychiatric: She has a  normal mood and affect. Her behavior is normal. Judgment and thought content normal.         ED Course   Lac Repair    Date/Time: 11/30/2024 7:42 PM    Performed by: Dayami Sykes PA-C  Authorized by: Richa Victor MD    Consent:     Consent obtained:  Verbal    Risks discussed:  Infection and pain  Universal protocol:     Patient identity confirmed:  Verbally with patient  Laceration details:     Location:  Leg    Leg location:  L lower leg    Length (cm):  5    Depth (mm):  1  Exploration:     Hemostasis achieved with:  Direct pressure    Wound extent: areolar tissue violated and fascia violated    Treatment:     Area cleansed with:  Chlorhexidine  Skin repair:     Repair method:  Sutures    Suture size:  4-0    Wound skin closure material used: Ethilon.    Suture technique:  Simple interrupted    Number of sutures:  10  Approximation:     Approximation:  Close  Repair type:     Repair type:  Simple  Post-procedure details:     Dressing:  Non-adherent dressing    Procedure completion:  Tolerated well, no immediate complications    Labs Reviewed - No data to display       Imaging Results    None          Medications   LETS (LIDOcaine-TETRAcaine-EPINEPHrine) gel solution ( Topical (Top) Given 11/30/24 1137)   LIDOcaine (PF) 10 mg/ml (1%) injection 50 mg (50 mg Infiltration Given by Provider 11/30/24 1140)     Medical Decision Making  Differential Diagnosis includes, but is not limited to:  Open fracture, vascular injury, tendon/ligament injury, nerve injury, retained foreign body, superficial laceration, abrasion, cellulitis    ED management     79 old female past medical history of asthma, subclinical hypothyroidism presents to the ED for evaluation of leg wound was sustained 3 days ago. Patient is not toxic appearing, hemodynamically stable and resting comfortably on bed. Patient is well-appearing.  Awake and alert.  Presents with, Afebrile with vitals WNL. No distress on exam. After complete evaluation,  including thorough history and physical exam, the patient's injury and laceration was deemed to be appropriate for primary closure in the ED d/t repeated bleeding. Lac repair performed; see note above. Advised to keep sutures/staples and wound clean and dry. Avoid submersion underwater; use soap, clean water, and gentle scrubbing to clean area. Apply a new sterile bandage when existing bandage becomes dirty or saturated. Monitor the wound regularly for any evidence of infection, including redness, drainage, increasing pain, or fever. Follow-up with your PCP or return to ER as directed for wound re-check and suture/staple removal in 7-10 days.     I have discussed the specifics of the workup with the patient and the patient has verbalized understanding of the details of the workup, the diagnosis, the treatment plan, and the need for outpatient follow-up with PCP. ED precautions given. Discussed with pt about returning to the ED, if symptoms fail to improve or worsen.   RESULTS:  Documented in ED course.   Labs/ekg interpreted by myself       Voice recognition software utilized in this note. Typographical and content errors may occur with this process. While efforts are made to detect and correct such errors, in some cases errors will persist. For this reason, wording in this document should be considered in the proper context and not strictly verbatim.     Risk  Prescription drug management.               ED Course as of 11/30/24 1941   Sat Nov 30, 2024   1112 Tetanus UTD 2021 [NW]      ED Course User Index  [NW] Dayami Sykes PA-C                           Clinical Impression:  Final diagnoses:  [S80.401D] Abrasion of anterior right lower leg, subsequent encounter (Primary)          ED Disposition Condition    Discharge Stable          ED Prescriptions    None       Follow-up Information       Follow up With Specialties Details Why Contact Info    Haleigh Seo MD Family Medicine Schedule an appointment as soon as  possible for a visit in 3 days As needed, If symptoms worsen [address] [facility] [place] [address]  [phone]               Dayami Sykes PA-C  11/30/24 2023

## 2024-12-06 ENCOUNTER — PATIENT MESSAGE (OUTPATIENT)
Dept: FAMILY MEDICINE | Facility: CLINIC | Age: 79
End: 2024-12-06
Payer: MEDICARE

## 2024-12-10 ENCOUNTER — OFFICE VISIT (OUTPATIENT)
Dept: ORTHOPEDICS | Facility: CLINIC | Age: 79
End: 2024-12-10
Payer: MEDICARE

## 2024-12-10 ENCOUNTER — HOSPITAL ENCOUNTER (OUTPATIENT)
Dept: RADIOLOGY | Facility: HOSPITAL | Age: 79
Discharge: HOME | End: 2024-12-10
Attending: ORTHOPAEDIC SURGERY
Payer: MEDICARE

## 2024-12-10 DIAGNOSIS — M79.642 LEFT HAND PAIN: Primary | ICD-10-CM

## 2024-12-10 DIAGNOSIS — M79.642 LEFT HAND PAIN: ICD-10-CM

## 2024-12-10 PROCEDURE — 1159F MED LIST DOCD IN RCRD: CPT | Mod: CPTII,S$GLB,, | Performed by: ORTHOPAEDIC SURGERY

## 2024-12-10 PROCEDURE — 1126F AMNT PAIN NOTED NONE PRSNT: CPT | Mod: CPTII,S$GLB,, | Performed by: ORTHOPAEDIC SURGERY

## 2024-12-10 PROCEDURE — 1101F PT FALLS ASSESS-DOCD LE1/YR: CPT | Mod: CPTII,S$GLB,, | Performed by: ORTHOPAEDIC SURGERY

## 2024-12-10 PROCEDURE — 73130 X-RAY EXAM OF HAND: CPT | Mod: 26,LT,, | Performed by: RADIOLOGY

## 2024-12-10 PROCEDURE — 99203 OFFICE O/P NEW LOW 30 MIN: CPT | Mod: S$GLB,,, | Performed by: ORTHOPAEDIC SURGERY

## 2024-12-10 PROCEDURE — 73130 X-RAY EXAM OF HAND: CPT | Mod: TC,LT

## 2024-12-10 PROCEDURE — 99999 PR PBB SHADOW E&M-EST. PATIENT-LVL II: CPT | Mod: PBBFAC,,, | Performed by: ORTHOPAEDIC SURGERY

## 2024-12-10 PROCEDURE — 3288F FALL RISK ASSESSMENT DOCD: CPT | Mod: CPTII,S$GLB,, | Performed by: ORTHOPAEDIC SURGERY

## 2024-12-10 NOTE — PROGRESS NOTES
[facility]  History & Physical  Orthopedics    SUBJECTIVE:      COVID-19 attestation:  This patient was treated during the COVID-19 pandemic.  This was discussed with the patient, they are aware of our current policies and procedures, were given the option of delaying their visit and or switching to a virtual visit, delaying their surgery when applicable, and they elect to proceed.    Chief Complaint: left hand pain, numbness and tingling     Referring Provider: No ref. provider found     History of Present Illness:  Patient is a 79 y.o. right hand dominant female who presents today with complaints of left hand pain, numbness and tingling. She states that her symptoms have been present for about 6-8 months. The paraesthesias are in the median nerve distrubution. She has noticed that sleeping with her arms extended rather than curled and flexed has helped with her symptoms. She occasionally takes anti-inflammatory medications and has noticed some relief. She reports a recent fall, not affecting or injuring her upper extremities.     The patient is a/an retired.    Onset of symptoms/DOI was 6-8 months ago.    Symptoms are aggravated by  at night with flexed wrists .    Symptoms are alleviated by  extension of wrists .    Symptoms consist of pain and numbness/tingling.    The patient rates their pain as a 3/10.    Attempted treatment(s) and/or interventions include activity modifications, rest,  and extending her wrists while she sleeps .     The patient denies any fevers, chills, N/V, D/C and presents for evaluation.       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    Asthma     last attack 15 years ago    PONV (postoperative nausea and vomiting)     Subclinical hypothyroidism      Past Surgical History:   Procedure Laterality Date    ANGIOGRAM, EXTREMITY, UNILATERAL Right 2/21/2024    Procedure: ANGIOGRAM, EXTREMITY, UNILATERAL, PTA ,;  Surgeon: Teo Murry MD;   Location: [facility] OR [address] [place] [address];  Service: Vascular;  Laterality: Right;  8.5 min  355.30 mGy  73.2661 Gy.cm  96ml Dye    BACK SURGERY       SECTION      CHOLECYSTECTOMY      HYSTERECTOMY      knee scoped      rotator cuff surgery Right     STENT, SUPERFICIAL FEMORAL ARTERY Right 2024    Procedure: STENT, SUPERFICIAL FEMORAL ARTERY;  Surgeon: Teo Murry MD;  Location: [facility] OR [address] [place] [address];  Service: Vascular;  Laterality: Right;    TONSILLECTOMY       Review of patient's allergies indicates:  No Known Allergies  Social History     Social History Narrative    Not on file     Family History   Problem Relation Name Age of Onset    Heart disease Mother      Prostate cancer Neg Hx      Kidney disease Neg Hx      Breast cancer Neg Hx      Colon cancer Neg Hx      Ovarian cancer Neg Hx           Current Outpatient Medications:     aspirin (ECOTRIN) 81 MG EC tablet, Take 1 tablet (81 mg total) by mouth once daily., Disp: 360 tablet, Rfl: 0    bempedoic acid-ezetimibe 180-10 mg Tab, Take 1 tablet daily., Disp: 90 tablet, Rfl: 3    clopidogreL (PLAVIX) 75 mg tablet, Take 1 tablet (75 mg total) by mouth once daily., Disp: 90 tablet, Rfl: 3    estradioL (ESTRACE) 2 MG tablet, Take 1 tablet (2 mg total) by mouth once daily., Disp: 90 tablet, Rfl: 3    MULTIVITAMIN W-MINERALS/LUTEIN (CENTRUM SILVER ORAL), Take 1 tablet by mouth once daily., Disp: , Rfl:     mupirocin (BACTROBAN) 2 % ointment, Apply topically 3 (three) times daily. (Patient not taking: Reported on 12/10/2024), Disp: 22 g, Rfl: 0    pantoprazole (PROTONIX) 20 MG tablet, Take 1 tablet (20 mg total) by mouth once daily., Disp: 90 tablet, Rfl: 3      Review of Systems:  As per HPI otherwise noncontributory    OBJECTIVE:      Vital Signs (Most Recent):  There were no vitals filed for this visit.  There is no height or weight on file to calculate BMI.      Physical Exam:  Constitutional: The patient appears well-developed and well-nourished. No distress.   Skin:  No lesions appreciated  Head: Normocephalic and atraumatic.   Nose: Nose normal.   Ears: No deformities seen  Eyes: Conjunctivae and EOM are normal.   Neck: No tracheal deviation present.   Cardiovascular: Normal rate and intact distal pulses.    Pulmonary/Chest: Effort normal. No respiratory distress.   Abdominal: There is no guarding.   Neurological: The patient is alert.   Psychiatric: The patient has a normal mood and affect.     Left Hand/Wrist Examination:    Observation/Inspection:  Swelling  none    Deformity  none  Discoloration  none     Scars   none    Atrophy  none    HAND/WRIST EXAMINATION:  Finkelstein's Test   Neg  WHAT Test    Neg  Snuff box tenderness   Neg  Nieto's Test    Neg  Hook of Hamate Tenderness  Neg  CMC grind    Neg  Circumduction test   Neg    Neurovascular Exam:  Digits WWP, brisk CR < 3s throughout  NVI motor/LTS to M/R/U nerves, radial pulse 2+  Tinel's Test - Carpal Tunnel  Neg  Tinel's Test - Cubital Tunnel  Neg  Phalen's Test    Neg  Median Nerve Compression Test Positive    ROM hand full, painless    ROM wrist full, painless    ROM elbow full, painless    Abdomen not guarded  Respirations nonlabored  Perfusion intact    Diagnostic Results:     Imaging - I independently viewed the patient's imaging as well as the radiology report.  Xrays of the patient's left hand  demonstrate no evidence of any acute fractures or dislocations or significant degenerative changes.    EMG - none    ASSESSMENT/PLAN:      79 y.o. yo female with left carpal tunnel syndrome   Plan: The patient and I had a thorough discussion today.  We discussed the working diagnosis as well as several other potential alternative diagnoses.  Treatment options were discussed, both conservative and surgical.  Conservative treatment options would include things such as activity modifications, workplace modifications, a period of rest, oral vs topical OTC and prescription anti-inflammatory medications, occupational therapy,  splinting/bracing, immobilization, corticosteroid injections, and others.  Surgical options were discussed as well.     At this time, the patient would like to proceed with a trial of nocturnal carpal tunnel braces and OTC oral NSAIDs. Patient will return as/if needed.     Should the patient's symptoms worsen, persist, or fail to improve they should return for reevaluation and I would be happy to see them back anytime.        Alexandro Humphreys M.D.    Please be aware that this note has been generated with the assistance of [employer] voice-to-text.  Please excuse any spelling or grammatical errors.    Thank you for choosing Dr. Alexandro Humphreys for your orthopedic hand and upper extremity care. It is our goal to provide you with exceptional care that will help keep you healthy, active, and get you back in the game.     If you felt that you received exemplary care today, please consider leaving feedback for Dr. Humphreys on [employer] at [email or web address].    Please do not hesitate to reach out to us via email, phone, or MyChart with any questions, concerns, or feedback.

## 2024-12-12 ENCOUNTER — OFFICE VISIT (OUTPATIENT)
Dept: FAMILY MEDICINE | Facility: CLINIC | Age: 79
End: 2024-12-12
Payer: MEDICARE

## 2024-12-12 VITALS
DIASTOLIC BLOOD PRESSURE: 82 MMHG | BODY MASS INDEX: 27.03 KG/M2 | HEIGHT: 66 IN | WEIGHT: 168.19 LBS | SYSTOLIC BLOOD PRESSURE: 144 MMHG

## 2024-12-12 DIAGNOSIS — Z48.02 VISIT FOR SUTURE REMOVAL: Primary | ICD-10-CM

## 2024-12-12 PROCEDURE — 1100F PTFALLS ASSESS-DOCD GE2>/YR: CPT | Mod: CPTII,S$GLB,,

## 2024-12-12 PROCEDURE — 3079F DIAST BP 80-89 MM HG: CPT | Mod: CPTII,S$GLB,,

## 2024-12-12 PROCEDURE — 99999 PR PBB SHADOW E&M-EST. PATIENT-LVL III: CPT | Mod: PBBFAC,,,

## 2024-12-12 PROCEDURE — 99214 OFFICE O/P EST MOD 30 MIN: CPT | Mod: S$GLB,,,

## 2024-12-12 PROCEDURE — 1160F RVW MEDS BY RX/DR IN RCRD: CPT | Mod: CPTII,S$GLB,,

## 2024-12-12 PROCEDURE — 15853 REMOVAL SUTR/STAPL XREQ ANES: CPT | Mod: S$GLB,,,

## 2024-12-12 PROCEDURE — 3077F SYST BP >= 140 MM HG: CPT | Mod: CPTII,S$GLB,,

## 2024-12-12 PROCEDURE — 1126F AMNT PAIN NOTED NONE PRSNT: CPT | Mod: CPTII,S$GLB,,

## 2024-12-12 PROCEDURE — 3288F FALL RISK ASSESSMENT DOCD: CPT | Mod: CPTII,S$GLB,,

## 2024-12-12 PROCEDURE — 1159F MED LIST DOCD IN RCRD: CPT | Mod: CPTII,S$GLB,,

## 2024-12-12 NOTE — PROGRESS NOTES
[facility]    12/12/2024      Subjective:       Patient ID:  Mireille is a 79 y.o. female .  has a past medical history of Angiomyolipoma, Asthma, PONV (postoperative nausea and vomiting), and Subclinical hypothyroidism.    History of Present Illness    CHIEF COMPLAINT:  Ms. Akins presents today for follow-up after a fall.    FALL INCIDENT AND RELATED INJURIES:  She reports a fall incident in [place], [place], where she tripped on a rubber threshold while entering a condo. She fell on one knee and hit her forehead on the floor. Due to her use of Plavix, she experienced significant bleeding from the forehead injury, which required stitches and glue. She denies any changes in vision, dizziness, or nausea following the fall. During this incident she received care at an urgent care and she states that she received a head CT and neuro exam was done no other injuries were noted.  In a separate incident, she injured her leg while stepping down from a step stool, scraping it against a chair and resulting in torn skin. This injury also required stitches due to persistent bleeding, likely exacerbated by Plavix.  She reports blood pressure fluctuations since starting Plavix and cholesterol medication. Her blood pressure was previously in the 120s but has now increased to the high 130s and 140s, which she perceives as her new normal.      ROS:  General: -fever, -chills, -fatigue, -weight gain, -weight loss  Eyes: -vision changes, -redness, -discharge  ENT: -ear pain, -nasal congestion, -sore throat  Cardiovascular: -chest pain, -palpitations, -lower extremity edema  Respiratory: -cough, -shortness of breath  Gastrointestinal: -abdominal pain, -nausea, -vomiting, -diarrhea, -constipation, -blood in stool  Genitourinary: -dysuria, -hematuria, -frequency  Musculoskeletal: -joint pain, -muscle pain  Skin: -rash, -lesion, +healing wound  Neurological: -headache, -dizziness, -numbness,  "-tingling  Psychiatric: -anxiety, -depression, -sleep difficulty             Patient Active Problem List   Diagnosis    Renal mass, right    Hormone replacement therapy (HRT)    Angiomyolipoma    Overweight with body mass index (BMI) of 27 to 27.9 in adult    Stress due to illness of family member-  - newly dx as diabetic-     Subclinical hypothyroidism    Peripheral arterial disease with history of revascularization    Mixed hyperlipidemia    Myalgia due to statin         Last HgbA1C:    No results found for: "HGBA1C"      Last Lipid Panel:    Lab Results   Component Value Date    HDL 82 (H) 04/22/2024    HDL 76 (H) 09/26/2023    HDL 76 (H) 10/10/2022       Lab Results   Component Value Date    LDLCALC 90.6 04/22/2024    LDLCALC 113.0 09/26/2023    LDLCALC 145.4 10/10/2022       Lab Results   Component Value Date    TRIG 82 04/22/2024    TRIG 110 09/26/2023    TRIG 128 10/10/2022       Lab Results   Component Value Date    CHOLHDL 43.4 04/22/2024    CHOLHDL 36.0 09/26/2023    CHOLHDL 30.8 10/10/2022         Review of patient's allergies indicates:  No Known Allergies     Medication List with Changes/Refills   Current Medications    ASPIRIN (ECOTRIN) 81 MG EC TABLET    Take 1 tablet (81 mg total) by mouth once daily.    BEMPEDOIC ACID-EZETIMIBE 180-10 MG TAB    Take 1 tablet daily.    CLOPIDOGREL (PLAVIX) 75 MG TABLET    Take 1 tablet (75 mg total) by mouth once daily.    ESTRADIOL (ESTRACE) 2 MG TABLET    Take 1 tablet (2 mg total) by mouth once daily.    MULTIVITAMIN W-MINERALS/LUTEIN (CENTRUM SILVER ORAL)    Take 1 tablet by mouth once daily.    MUPIROCIN (BACTROBAN) 2 % OINTMENT    Apply topically 3 (three) times daily.    PANTOPRAZOLE (PROTONIX) 20 MG TABLET    Take 1 tablet (20 mg total) by mouth once daily.               Objective:      BP (!) 144/82 (BP Location: Right arm, Patient Position: Sitting)   Ht 5' 6" (1.676 m)   Wt 76.3 kg (168 lb 3.4 oz)   LMP  (LMP Unknown)   BMI 27.15 kg/m² " "  Estimated body mass index is 27.15 kg/m² as calculated from the following:    Height as of this encounter: 5' 6" (1.676 m).    Weight as of this encounter: 76.3 kg (168 lb 3.4 oz).    Physical Exam  Vitals reviewed.   Constitutional:       Appearance: Normal appearance.      Comments: Two healing wounds with one located on anterior right lower leg and other wound located on the left eyebrow.    HENT:      Head: Normocephalic and atraumatic.      Comments: 3 sutures on left upper eyelid     Mouth/Throat:      Mouth: Mucous membranes are moist.      Pharynx: Oropharynx is clear.   Eyes:      Conjunctiva/sclera: Conjunctivae normal.   Cardiovascular:      Rate and Rhythm: Normal rate and regular rhythm.      Heart sounds: Normal heart sounds.   Pulmonary:      Effort: Pulmonary effort is normal. No respiratory distress.      Breath sounds: Normal breath sounds.   Musculoskeletal:         General: Normal range of motion.      Cervical back: Normal range of motion.      Comments: 10 sutures on right lower leg.   Skin:     Findings: Wound present.   Neurological:      Mental Status: She is alert and oriented to person, place, and time.   Psychiatric:         Mood and Affect: Mood normal.             Assessment and Plan:     Mireille was seen today for suture / staple removal.    Diagnoses and all orders for this visit:    Visit for suture removal  -     Suture Removal          Assessment & Plan    - Evaluated patient's head injury from fall in [place], [place]  - Reviewed ER management, including initial 3 sutures and subsequent glue application  - Assessed leg wound from separate incident, which required ER visit due to continued bleeding (likely related to Plavix use)  - Confirmed no signs of concussion or other neurological concerns post-fall   Discussed normal progression of bruising after injury.   Explained increased bleeding risk associated with Plavix use.  Stitches removed from face and leg with no " complications.   Keep wounds clean and do not submerge in water.   Also you can use OTC neosporin and keep wound covered when wearing pants or other fabrics that may irritate the skin.    Follow up if current wounds get worse and develop any redness, burning sensation or wound looks infected or not healing properly.         The patient was informed of the following statements     Emergency Care:Seek immediate medical attention in the emergency room if you experience any new or worsening symptoms, or if your current condition significantly changes or becomes more severe.  Patient Acknowledgment: Patient verbalizes understanding of the plan and agrees to proceed with the recommended care.    Follow Up:       No follow-ups on file.    Other Orders Placed This Visit:  Orders Placed This Encounter   Procedures    Suture Removal         This note was generated with the assistance of ambient listening technology. Verbal consent was obtained by the patient and accompanying visitor(s) for the recording of patient appointment to facilitate this note. I attest to having reviewed and edited the generated note for accuracy, though some syntax or spelling errors may persist. Please contact the author of this note for any clarification.    I spent a total of 35 minutes on the day of the visit.This includes face to face time and non-face to face time preparing to see the patient (eg, review of tests), obtaining and/or reviewing separately obtained history, documenting clinical information in the electronic or other health record, independently interpreting results and communicating results to the patient/family/caregiver, or care coordinator.      Oscar Yadav PA-C

## 2024-12-12 NOTE — PROCEDURES
Suture Removal    Date/Time: 12/12/2024 8:00 AM  Location procedure was performed: [facility]    Performed by: Oscar Yadav PA  Authorized by: Oscar Yadav PA  Body area: head/neck (Removed sutures from lower leg as well.)  Location details: left eyebrow  Wound Appearance: clean and well healed  Sutures Removed: 3 (10 in right lower leg)  Staples Removed: 0  Post-removal: no dressing applied  Complications: No  Specimens: No  Implants: No

## 2024-12-18 ENCOUNTER — OFFICE VISIT (OUTPATIENT)
Dept: SPORTS MEDICINE | Facility: CLINIC | Age: 79
End: 2024-12-18
Payer: MEDICARE

## 2024-12-18 VITALS — SYSTOLIC BLOOD PRESSURE: 140 MMHG | HEART RATE: 72 BPM | DIASTOLIC BLOOD PRESSURE: 71 MMHG

## 2024-12-18 DIAGNOSIS — M67.951 TENDINOPATHY OF RIGHT GLUTEAL REGION: Primary | ICD-10-CM

## 2024-12-18 DIAGNOSIS — M70.61 GREATER TROCHANTERIC BURSITIS OF RIGHT HIP: ICD-10-CM

## 2024-12-18 DIAGNOSIS — M25.551 LATERAL PAIN OF RIGHT HIP: ICD-10-CM

## 2024-12-18 PROCEDURE — 99999 PR PBB SHADOW E&M-EST. PATIENT-LVL III: CPT | Mod: PBBFAC,,, | Performed by: NEUROMUSCULOSKELETAL MEDICINE & OMM

## 2024-12-18 RX ORDER — TRIAMCINOLONE ACETONIDE 40 MG/ML
40 INJECTION, SUSPENSION INTRA-ARTICULAR; INTRAMUSCULAR
Status: COMPLETED | OUTPATIENT
Start: 2024-12-18 | End: 2024-12-18

## 2024-12-18 RX ADMIN — TRIAMCINOLONE ACETONIDE 40 MG: 40 INJECTION, SUSPENSION INTRA-ARTICULAR; INTRAMUSCULAR at 12:12

## 2024-12-18 NOTE — PROGRESS NOTES
Subjective:     Mireille Akins     Chief Complaint   Patient presents with    Left Hip - Pain    Right Hip - Pain     HPI    Mireille is a 79 y.o. female coming in today for right hip pain. Since last visit the pain has remained unchanged.  Pt is compliant with HEP. The pain is better with HEP, exercise and worse with nighttime, rolling over in bed, lying flat. Pt reports sig pain disrupting her sleep. Pt. describes the pain as a 0/10 currently, 9/10 at worst. There has not been any new a fall/injury/ or traumas since last visit.  Pt. denies any new musculoskeletal complaints at this time.     Office note from 8/15/24 reviewed    Joint instability? no  Mechanical locking/clicking? no  Affecting ADL's? yes  Affecting sleep? yes    Occupation: retired    PAST MEDICAL HISTORY:   Past Medical History:   Diagnosis Date    Angiomyolipoma 2022    Asthma     last attack 15 years ago    PONV (postoperative nausea and vomiting)     Subclinical hypothyroidism      PAST SURGICAL HISTORY:   Past Surgical History:   Procedure Laterality Date    ANGIOGRAM, EXTREMITY, UNILATERAL Right 2024    Procedure: ANGIOGRAM, EXTREMITY, UNILATERAL, PTA ,;  Surgeon: Teo Murry MD;  Location: [facility] OR [address] [place] [address];  Service: Vascular;  Laterality: Right;  8.5 min  355.30 mGy  73.2661 Gy.cm  96ml Dye    BACK SURGERY       SECTION      CHOLECYSTECTOMY      HYSTERECTOMY      knee scoped      rotator cuff surgery Right     STENT, SUPERFICIAL FEMORAL ARTERY Right 2024    Procedure: STENT, SUPERFICIAL FEMORAL ARTERY;  Surgeon: Teo Murry MD;  Location: [facility] OR [address] [place] [address];  Service: Vascular;  Laterality: Right;    TONSILLECTOMY       FAMILY HISTORY:   Family History   Problem Relation Name Age of Onset    Heart disease Mother      Prostate cancer Neg Hx      Kidney disease Neg Hx      Breast cancer Neg Hx      Colon cancer Neg Hx      Ovarian cancer Neg Hx       SOCIAL HISTORY:   Social History     Socioeconomic  History    Marital status:    Tobacco Use    Smoking status: Former     Types: Cigarettes     Start date: 6/2/1965     Passive exposure: Past    Smokeless tobacco: Never    Tobacco comments:     quit at age 20   Substance and Sexual Activity    Alcohol use: Not Currently    Drug use: No    Sexual activity: Yes     Partners: Male     Birth control/protection: See Surgical Hx     Social Drivers of Health     Financial Resource Strain: Low Risk  (9/6/2022)    Overall Financial Resource Strain (CARDIA)     Difficulty of Paying Living Expenses: Not hard at all   Food Insecurity: No Food Insecurity (9/6/2022)    Hunger Vital Sign     Worried About Running Out of Food in the Last Year: Never true     Ran Out of Food in the Last Year: Never true   Transportation Needs: No Transportation Needs (9/6/2022)    PRAPARE - Transportation     Lack of Transportation (Medical): No     Lack of Transportation (Non-Medical): No   Physical Activity: Sufficiently Active (8/10/2020)    Exercise Vital Sign     Days of Exercise per Week: 3 days     Minutes of Exercise per Session: 60 min   Stress: Stress Concern Present (9/6/2022)    [employer] - Occupational Stress Questionnaire     Feeling of Stress : Very much   Housing Stability: Unknown (9/6/2022)    Housing Stability Vital Sign     Unable to Pay for Housing in the Last Year: No     Unstable Housing in the Last Year: No     MEDICATIONS:   Current Outpatient Medications:     aspirin (ECOTRIN) 81 MG EC tablet, Take 1 tablet (81 mg total) by mouth once daily., Disp: 360 tablet, Rfl: 0    bempedoic acid-ezetimibe 180-10 mg Tab, Take 1 tablet daily., Disp: 90 tablet, Rfl: 3    clopidogreL (PLAVIX) 75 mg tablet, Take 1 tablet (75 mg total) by mouth once daily., Disp: 90 tablet, Rfl: 3    estradioL (ESTRACE) 2 MG tablet, Take 1 tablet (2 mg total) by mouth once daily., Disp: 90 tablet, Rfl: 3    MULTIVITAMIN W-MINERALS/LUTEIN (CENTRUM SILVER ORAL), Take 1  tablet by mouth once daily., Disp: , Rfl:     pantoprazole (PROTONIX) 20 MG tablet, Take 1 tablet (20 mg total) by mouth once daily., Disp: 90 tablet, Rfl: 3    mupirocin (BACTROBAN) 2 % ointment, Apply topically 3 (three) times daily. (Patient not taking: Reported on 12/10/2024), Disp: 22 g, Rfl: 0  No current facility-administered medications for this visit.    ALLERGIES: Review of patient's allergies indicates:  No Known Allergies    Objective:     VITAL SIGNS: BP (!) 140/71 (Patient Position: Sitting)   Pulse 72   LMP  (LMP Unknown)    General    Vitals reviewed.  Constitutional: She is oriented to person, place, and time. She appears well-developed and well-nourished.   Neurological: She is alert and oriented to person, place, and time.   Psychiatric: She has a normal mood and affect. Her behavior is normal.           MUSCULOSKELETAL EXAM  HIP: right HIP  The affected hip is compared to the contralateral hip.    Observation:    There is no edema, erythema, or ecchymosis in the lumbosacral region.   There is no Trendelenburg sign on either side  No obvious pelvic obliquity while standing   No thoracolumbar scoliosis observed.    No midline skin abnormalities.  No atrophy noted in the lower limbs.  Gait: Non-antalgic with Neutral ankle mechanics and Neutral medial arch  Improved bilateral pelvic stability but still with some bilateral hip hiking compensatory pattern noted with single leg raise    ROM (* = with pain):  Passive hip flexion to 120° on left and 120° on right  Passive hip internal rotation to 45° on left and 45° on right  Passive hip external rotation to 45° on left and 45° on right   Passive hip abduction to 45° on left and 45° on right  All motions above are without pain.    Tenderness To Palpation:  No tenderness at the ASIS, AIIS, PSIS, PIIS, iliac crest, pubic bones, ischial tuberosity.  No tenderness throughout the lumbar spine, iliolumbar region, or posterior pelvis.  No tenderness throughout  the sacrum   No deep hip external rotator muscular tenderness on left  + tenderness over the greater trochanteric bursa on the right  + tenderness at the glut attachments on the greater trochanter on right  No tenderness over proximal IT band or hip flexor musculature.    Strength Testing (* = with pain):  Hip flexion - 5/5 on left and 5/5 on right  Hip extension - 5/5 on left and 5/5 on right  Hip adduction - 5/5 on left and 5/5 on right  Hip abduction - 5/5 on left and 5/5 on right  Knee flexion - 5/5 on left and 5/5 on right  Knee extension - 5/5 on left and 5/5 on right  Glutaeus medius - 5/5 on left and 5/5 on right    Special Tests:  Standing Trendelenburg test - negative    Seated straight leg raise - negative  Supine straight leg raise - negative  Hamstring flexibility symmetric    Log roll - negative  FLOR - negative  FADIR - negative  Scour test - negative  No pain with posterior hip capsule compression    ASIS compression test - negative  SI drawer test - negative   Thigh thrust test - negative     Piriformis test (Bonnet's) - negative  Ely's test - negative  Quadriceps flexibility symmetric.  Nicol test - negative  Sergio compression test - negative    Fulcrum test - negative    Leg lengths symmetric following OMT to the pelvis.     Neurovascular Exam:  Normal gait without Trendelenburg or antalgia.  2+ femoral, DP, and PT pulses BL.  No skin changes, no abnormal hair distribution.  Sensation intact to light touch throughout the obturator and medial/lateral/posterior femoral cutaneous nerves.  Capillary refill intact to <2 seconds in all lower limb digits.    Large Joint Aspiration/Injection   Greater trochanteric bursa right    Performed by: DESTINEE SARMIENTO  Authorized by: DESTINEE SARMIENTO   Consent Done?: Yes (Verbal)  Indications: Pain  Site marked: The procedure site was marked   Timeout: Prior to procedure the correct patient, procedure, and site was verified     Location: Greater trochanteric  "bursa, right  Prep: Patient was prepped with Chlorhexidine and alcohol.  Skin anesthetic: Ethyl Chloride spray was used prior to skin puncture.  Ultrasonic Guidance for needle placement: Yes  Procedure: After skin anesthetic was applied, the 22G, 3.5 needle was used to inject the greater trochanteric bursa with a 3 cc mixture of 1 cc of 40 mg/ml triamcinolone acetonide and 2 cc of 0.2% Naropin was injected into the bursa.  Needle size: 22 G, 3.5  Approach: Lateral  Medications: 40 mg triamcinolone acetonide 40 mg/mL  Patient tolerance: Patient tolerated the procedure well with no immediate complications    Ultrasound guidance was used for needle localization with SonKineticte Edge 2, 9-L MHz linear probe(s). Images were saved and stored for documentation. The structures of the lateral hip were visualized. Dynamic visualization of the 20g 3.5" needle(s) was continuous throughout the procedure and maintained good position and correct needle placement.      Triamcinolone:  NDC: [address]-3  LOT: [id number]  EXP: 06/2026     Assessment:      Encounter Diagnoses   Name Primary?    Tendinopathy of right gluteal region Yes    Greater trochanteric bursitis of right hip     Lateral pain of right hip         Plan:   1. RIGHT lateral hip pain secondary to gluteal and associated greater trochanteric due to underlying  poor pelvic/core stability with compensatory muscle firing patterns. Underlying bilateral hip DJD changes noted on previous x-rays, however no significant intra-articular symptoms appreciated on physical exam.   - given persistent pain with OMT and home exercise program alone, patient received an ultrasound guided corticosteroid injection of the right GTB  today (see details above).   - Continue over-the-counter Tylenol and Ice up to 20 minutes at a time prn for pain control.  Recommend avoiding NSAIDs on Plavix.  - Referral to outpatient PT ([facility]) for increased pelvic " stability, gluteal eccentric exercises, NM re-ed and HEP.   -  X-ray images of left hip taken 8/15/24 (AP pelvis and frogleg lateral  left views) showed Mild bilateral hip DJD changes.  Lower lumbar degenerative changes also noted.   - X-ray images of right hip taken 8/15/24 (AP pelvis and frogleg lateral  right views) showed mild bilateral hip DJD changes.  Mild degenerative changes of the lower lumbar spine also noted. Images were personally reviewed with patient.    2. Continue the following HEP:  A)  Pelvic clock exercises given to do from the 6-12 o'clock positions:10-15 reps, twice daily. Hand out of exercise also given.   B) Clam shell exercises bilaterally: hold leg in abducted and externally rotated position for 5-10 seconds, repeat 5-10 times  C) Lower abdominal muscle isotonic exercises: Rotate pelvis into the 6 o'clock position and holding it to engage lower abdominal muscles. Then lift up leg, one at a time, alternating for 10-15 reps. Repeat exercises 1-2 times daily. Handout given.     The patient was taught a homegoing physical therapy regimen as described above. The patient demonstrated understanding of the exercises and proper technique of their execution.     3. Follow-up upon completion of PT if pain persist or deteriorates    4. Patient agreeable to today's plan and all questions were answered    This note is dictated using the M*Modal Fluency Direct word recognition program. There are word recognition mistakes that are occasionally missed on review.

## 2025-01-07 ENCOUNTER — HOSPITAL ENCOUNTER (OUTPATIENT)
Dept: CARDIOLOGY | Facility: HOSPITAL | Age: 80
Discharge: HOME | End: 2025-01-07
Attending: SURGERY
Payer: MEDICARE

## 2025-01-07 DIAGNOSIS — I73.9 PAD (PERIPHERAL ARTERY DISEASE): ICD-10-CM

## 2025-01-07 PROCEDURE — 93925 LOWER EXTREMITY STUDY: CPT

## 2025-01-07 PROCEDURE — 93925 LOWER EXTREMITY STUDY: CPT | Mod: 26,,, | Performed by: INTERNAL MEDICINE

## 2025-01-08 LAB
LEFT CFA PSV: 102 CM/S
LEFT PERONEAL SYS PSV: 17 CM/S
LEFT POST TIBIAL SYS PSV: 13 CM/S
LEFT PROFUNDA SYS PSV: 143 CM/S
LEFT SUPER FEMORAL DIST SYS PSV: 76 CM/S
LEFT SUPER FEMORAL MID SYS PSV: 111 CM/S
LEFT SUPER FEMORAL PROX SYS PSV: 103 CM/S
LEFT TIB/PER TRUNK SYS PSV: 81 CM/S
RIGHT ANT TIBIAL SYS PSV: 76 CM/S
RIGHT CFA PSV: 101 CM/S
RIGHT DORSALIS PEDIS PSV: 22 CM/S
RIGHT PERONEAL SYS PSV: 26 CM/S
RIGHT POPLITEAL PSV: 52 CM/S
RIGHT POST TIBIAL SYS PSV: 114 CM/S
RIGHT PROFUNDA SYS PSV: 93 CM/S
RIGHT SUPER FEMORAL DIST SYS PSV: 113 CM/S
RIGHT SUPER FEMORAL MID SYS PSV: 65 CM/S
RIGHT SUPER FEMORAL PROX SYS PSV: 105 CM/S
RIGHT TIB/PER TRUNK SYS PSV: 44 CM/S

## 2025-01-15 ENCOUNTER — PATIENT MESSAGE (OUTPATIENT)
Dept: VASCULAR SURGERY | Facility: CLINIC | Age: 80
End: 2025-01-15
Payer: MEDICARE

## 2025-01-18 ENCOUNTER — PATIENT MESSAGE (OUTPATIENT)
Dept: SPORTS MEDICINE | Facility: CLINIC | Age: 80
End: 2025-01-18
Payer: MEDICARE

## 2025-01-24 ENCOUNTER — PATIENT MESSAGE (OUTPATIENT)
Dept: CARDIOLOGY | Facility: CLINIC | Age: 80
End: 2025-01-24
Payer: MEDICARE

## 2025-01-24 RX ORDER — EZETIMIBE 10 MG/1
10 TABLET ORAL DAILY
Qty: 90 TABLET | Refills: 3 | Status: SHIPPED | OUTPATIENT
Start: 2025-01-24 | End: 2026-01-24

## 2025-01-28 ENCOUNTER — PATIENT MESSAGE (OUTPATIENT)
Dept: SPORTS MEDICINE | Facility: CLINIC | Age: 80
End: 2025-01-28
Payer: MEDICARE

## 2025-01-29 ENCOUNTER — TELEPHONE (OUTPATIENT)
Dept: ORTHOPEDICS | Facility: CLINIC | Age: 80
End: 2025-01-29
Payer: MEDICARE

## 2025-01-29 ENCOUNTER — OFFICE VISIT (OUTPATIENT)
Dept: VASCULAR SURGERY | Facility: CLINIC | Age: 80
End: 2025-01-29
Payer: MEDICARE

## 2025-01-29 VITALS — HEART RATE: 92 BPM | DIASTOLIC BLOOD PRESSURE: 81 MMHG | SYSTOLIC BLOOD PRESSURE: 165 MMHG | TEMPERATURE: 98 F

## 2025-01-29 DIAGNOSIS — I73.9 PAD (PERIPHERAL ARTERY DISEASE): Primary | ICD-10-CM

## 2025-01-29 DIAGNOSIS — I73.9 PERIPHERAL ARTERIAL DISEASE WITH HISTORY OF REVASCULARIZATION: Primary | ICD-10-CM

## 2025-01-29 DIAGNOSIS — Z98.890 PERIPHERAL ARTERIAL DISEASE WITH HISTORY OF REVASCULARIZATION: Primary | ICD-10-CM

## 2025-01-29 PROCEDURE — 1101F PT FALLS ASSESS-DOCD LE1/YR: CPT | Mod: CPTII,S$GLB,, | Performed by: SURGERY

## 2025-01-29 PROCEDURE — 3288F FALL RISK ASSESSMENT DOCD: CPT | Mod: CPTII,S$GLB,, | Performed by: SURGERY

## 2025-01-29 PROCEDURE — 3077F SYST BP >= 140 MM HG: CPT | Mod: CPTII,S$GLB,, | Performed by: SURGERY

## 2025-01-29 PROCEDURE — 1125F AMNT PAIN NOTED PAIN PRSNT: CPT | Mod: CPTII,S$GLB,, | Performed by: SURGERY

## 2025-01-29 PROCEDURE — 1159F MED LIST DOCD IN RCRD: CPT | Mod: CPTII,S$GLB,, | Performed by: SURGERY

## 2025-01-29 PROCEDURE — 3079F DIAST BP 80-89 MM HG: CPT | Mod: CPTII,S$GLB,, | Performed by: SURGERY

## 2025-01-29 PROCEDURE — 99213 OFFICE O/P EST LOW 20 MIN: CPT | Mod: S$GLB,,, | Performed by: SURGERY

## 2025-01-29 PROCEDURE — 99999 PR PBB SHADOW E&M-EST. PATIENT-LVL III: CPT | Mod: PBBFAC,,, | Performed by: SURGERY

## 2025-01-29 RX ORDER — CYCLOBENZAPRINE HCL 10 MG
10 TABLET ORAL 3 TIMES DAILY PRN
Qty: 30 TABLET | Refills: 0 | Status: SHIPPED | OUTPATIENT
Start: 2025-01-29 | End: 2025-02-08

## 2025-01-29 NOTE — PROGRESS NOTES
Patient returns for follow-up on her right leg peripheral vascular disease.  She had a stent placed previously.  She comes in today with 10/10 pain.  She has a work worse back pain she said she has ever experienced.  She said she was seen by sports Medicine and they injected her bursa and the left hip and now the right hip hurts and she has pain going across her whole back.  She was started on a Medrol Dosepak.  At this point she can not tolerate anything.  She is supposed to get ABIs and a vascular lab study today that she did not show up for because of the severe pain.  I looked at her foot and she has got capillary refill.  She did have an ultrasound on 01/07/2025 which showed a patent stent.  However at this point I can not palpate good pulses she is really in so much pain and she is wiggling her toes.  I have given her some Flexeril to help with the spasms hopefully that will help her a little bit.  I have asked her to reach out to her primary care doctor and see if possibly she can see her and see the sports medicine doctor again.    I have told her to get through the back problem then we will see her back in 1 month when I met the main [place] we can see her and get ABIs and a duplex scan of the fem stent at that point.  Thank you

## 2025-01-29 NOTE — TELEPHONE ENCOUNTER
Pt declined appointment offered and being placed on the wait list.   ----- Message from Loree Saleh PA-C sent at 1/29/2025  1:56 PM CST -----  Nini and I are fully booked this week as of now but she can be put on waitlist  ----- Message -----  From: Deanna Villarreal MA  Sent: 1/29/2025   1:47 PM CST  To: Delfino Ralph Staff; Leif Blake Staff      ----- Message -----  From: Sheng Alejandre  Sent: 1/29/2025   1:08 PM CST  To: [facility] Staff    Type:  Sooner Apoointment Request    Caller is requesting a sooner appointment.  When is the first available appointment?02/06  Symptoms:back pain  Would the patient rather a call back or a response via [facility]? Call   Best Call Back Number:jose jeronimo daughter [phone]  Additional Information:

## 2025-02-03 ENCOUNTER — HOSPITAL ENCOUNTER (OUTPATIENT)
Dept: RADIOLOGY | Facility: HOSPITAL | Age: 80
Discharge: HOME | End: 2025-02-03
Attending: EMERGENCY MEDICINE
Payer: MEDICARE

## 2025-02-03 ENCOUNTER — OFFICE VISIT (OUTPATIENT)
Dept: PAIN MEDICINE | Facility: CLINIC | Age: 80
End: 2025-02-03
Payer: MEDICARE

## 2025-02-03 VITALS — SYSTOLIC BLOOD PRESSURE: 130 MMHG | DIASTOLIC BLOOD PRESSURE: 72 MMHG | HEART RATE: 89 BPM

## 2025-02-03 DIAGNOSIS — M51.362 DEGENERATION OF INTERVERTEBRAL DISC OF LUMBAR REGION WITH DISCOGENIC BACK PAIN AND LOWER EXTREMITY PAIN: ICD-10-CM

## 2025-02-03 DIAGNOSIS — M51.362 DEGENERATION OF INTERVERTEBRAL DISC OF LUMBAR REGION WITH DISCOGENIC BACK PAIN AND LOWER EXTREMITY PAIN: Primary | ICD-10-CM

## 2025-02-03 DIAGNOSIS — M54.16 LUMBAR RADICULOPATHY: ICD-10-CM

## 2025-02-03 DIAGNOSIS — M54.9 DORSALGIA, UNSPECIFIED: ICD-10-CM

## 2025-02-03 PROCEDURE — 72114 X-RAY EXAM L-S SPINE BENDING: CPT | Mod: TC

## 2025-02-03 PROCEDURE — 1159F MED LIST DOCD IN RCRD: CPT | Mod: CPTII,S$GLB,, | Performed by: EMERGENCY MEDICINE

## 2025-02-03 PROCEDURE — 1101F PT FALLS ASSESS-DOCD LE1/YR: CPT | Mod: CPTII,S$GLB,, | Performed by: EMERGENCY MEDICINE

## 2025-02-03 PROCEDURE — 3075F SYST BP GE 130 - 139MM HG: CPT | Mod: CPTII,S$GLB,, | Performed by: EMERGENCY MEDICINE

## 2025-02-03 PROCEDURE — 3078F DIAST BP <80 MM HG: CPT | Mod: CPTII,S$GLB,, | Performed by: EMERGENCY MEDICINE

## 2025-02-03 PROCEDURE — 99999 PR PBB SHADOW E&M-EST. PATIENT-LVL III: CPT | Mod: PBBFAC,,, | Performed by: EMERGENCY MEDICINE

## 2025-02-03 PROCEDURE — 99204 OFFICE O/P NEW MOD 45 MIN: CPT | Mod: S$GLB,,, | Performed by: EMERGENCY MEDICINE

## 2025-02-03 PROCEDURE — 72114 X-RAY EXAM L-S SPINE BENDING: CPT | Mod: 26,,, | Performed by: RADIOLOGY

## 2025-02-03 PROCEDURE — 3288F FALL RISK ASSESSMENT DOCD: CPT | Mod: CPTII,S$GLB,, | Performed by: EMERGENCY MEDICINE

## 2025-02-03 PROCEDURE — 1126F AMNT PAIN NOTED NONE PRSNT: CPT | Mod: CPTII,S$GLB,, | Performed by: EMERGENCY MEDICINE

## 2025-02-03 RX ORDER — GABAPENTIN 300 MG/1
CAPSULE ORAL
Qty: 69 CAPSULE | Refills: 0 | Status: SHIPPED | OUTPATIENT
Start: 2025-02-03 | End: 2025-02-25 | Stop reason: SDUPTHER

## 2025-02-03 RX ORDER — LORAZEPAM 1 MG/1
1 TABLET ORAL
Qty: 1 TABLET | Refills: 0 | Status: SHIPPED | OUTPATIENT
Start: 2025-02-03 | End: 2025-02-19 | Stop reason: ALTCHOICE

## 2025-02-03 NOTE — PROGRESS NOTES
Interventional Pain Management - New Patient Visit    Chief Complaint   Patient presents with    Back Pain    Hip Pain    Leg Pain    Knee Pain        Original HPI 2025: Mireille Akins  presents to the clinic for the evaluation of the above pain. The pain started 1 week ago     Original Pain Description:  The pain is located in the lower back and is radiating to the right leg around to anterior and to top of foot . The pain is described as aching, burning, sharp, shooting, stabbing, and throbbing. Exacerbating factors: Laying, Night Time, Morning, Flexing, Lifting, and Getting out of bed/chair. Mitigating factors heat and medications. Symptoms interfere with daily activity. The patient feels like symptoms have been worsening. Patient denies night fever/night sweats, urinary incontinence, bowel incontinence, significant weight loss, significant motor weakness, and loss of sensations. Dr. Love has been treating her right hip bursitis including recent injection. She was placed in traction at her last PT appointment 11 days ago and the pain started      PAIN SCORES:  Best: Pain is 2  Current: Pain is 0  Worst: Pain is 10    6 weeks of Conservative therapy:  PT: Participating  Chiro:  HEP: Participating    Treatments / Medications:   Flexeril 10mg     Antiplatelets/Anticoagulants:  Aspirin 81 mg  Plavix 75 mg    Interventional Pain Procedures:   Laminectomy in 90s    IMAGING:    NA  Past Surgical History:   Procedure Laterality Date    ANGIOGRAM, EXTREMITY, UNILATERAL Right 2024    Procedure: ANGIOGRAM, EXTREMITY, UNILATERAL, PTA ,;  Surgeon: Teo Murry MD;  Location: [facility] OR [address] [place] [address];  Service: Vascular;  Laterality: Right;  8.5 min  355.30 mGy  73.2661 Gy.cm  96ml Dye    BACK SURGERY       SECTION      CHOLECYSTECTOMY      HYSTERECTOMY      knee scoped      rotator cuff surgery Right     STENT, SUPERFICIAL FEMORAL ARTERY Right 2024    Procedure: STENT, SUPERFICIAL FEMORAL  ARTERY;  Surgeon: Teo Murry MD;  Location: [facility] OR [address] [place] [address];  Service: Vascular;  Laterality: Right;    TONSILLECTOMY         Social History     Socioeconomic History    Marital status:    Tobacco Use    Smoking status: Former     Types: Cigarettes     Start date: 6/2/1965     Passive exposure: Past    Smokeless tobacco: Never    Tobacco comments:     quit at age 20   Substance and Sexual Activity    Alcohol use: Not Currently    Drug use: No    Sexual activity: Yes     Partners: Male     Birth control/protection: See Surgical Hx     Social Drivers of Health     Financial Resource Strain: Low Risk  (9/6/2022)    Overall Financial Resource Strain (CARDIA)     Difficulty of Paying Living Expenses: Not hard at all   Food Insecurity: No Food Insecurity (9/6/2022)    Hunger Vital Sign     Worried About Running Out of Food in the Last Year: Never true     Ran Out of Food in the Last Year: Never true   Transportation Needs: No Transportation Needs (9/6/2022)    PRAPARE - Transportation     Lack of Transportation (Medical): No     Lack of Transportation (Non-Medical): No   Physical Activity: Sufficiently Active (8/10/2020)    Exercise Vital Sign     Days of Exercise per Week: 3 days     Minutes of Exercise per Session: 60 min   Stress: Stress Concern Present (9/6/2022)    [employer] - Occupational Stress Questionnaire     Feeling of Stress : Very much   Housing Stability: Unknown (9/6/2022)    Housing Stability Vital Sign     Unable to Pay for Housing in the Last Year: No     Unstable Housing in the Last Year: No       Medications/Allergies: See med card    ROS:  GENERAL: No fever. No chills. No fatigue. Denies weight loss. Denies weight gain.  Back / musculoskeletal / neuro : See HPI    VITALS:   Vitals:    02/03/25 1413   BP: 130/72   Pulse: 89   PainSc: 0-No pain   PainLoc: Back     There is no height or weight on file to calculate BMI.      2/3/2025     2:14 PM   Last 3 PDI  "Scores   Pain Disability Index (PDI) 35       PHYSICAL EXAM:   GENERAL: Well appearing, in no acute distress, alert and oriented x3.  PSYCH:  Mood and affect appropriate.  SKIN: Skin color, texture, turgor normal, no rashes or lesions.  HEENT:  Normocephalic, atraumatic. Cranial nerves grossly intact.  NECK: No pain to palpation over the cervical paraspinous muscles. No pain to palpation over facets. No pain with neck flexion, extension, or lateral flexion.   PULM: No evidence of respiratory difficulty, symmetric chest rise.  GI:  Non-distended  BACK:  Limited range of motion. No pain to palpation over the spinous processes.  Pain with axial loading bilaterally.  Tenderness to palpation right SI  EXTREMITIES: No deformities, edema, or skin discoloration.   MUSCULOSKELETAL: Shoulder, hip, and knee provocative maneuvers are negative. No atrophy is noted.  NEURO: Sensation is equal and appropriate bilaterally. Bilateral upper and lower extremity strength is normal and symmetric. Bilateral upper and lower extremity coordination and muscle stretch reflexes are physiologic and symmetric. Plantar response are downgoing. Straight leg raising in the supine position is positive to radicular pain on right  GAIT: normal.      LABS:    No results found for: "LABA1C", "HGBA1C"    Lab Results   Component Value Date    CREATININE 0.8 04/22/2024         ASSESSMENT: 79 y.o. year old female with pain, consistent with:    Encounter Diagnoses   Name Primary?    Degeneration of intervertebral disc of lumbar region with discogenic back pain and lower extremity pain Yes    Lumbar radiculopathy     Dorsalgia, unspecified        DISCUSSION: Mireille Akins is a patient who comes to us with acute lower back pain most consistent with radiculopathy    PLAN:  - I have stressed the importance of physical activity and a home exercise plan to help with pain and improve health.  - Patient can continue with medications for now since they " are providing benefits, using them appropriately, and without side effects.  - Counseled patient regarding the importance of activity modification and constant sleeping habits.  - Anticoagulation use: Yes aspirin and Plavix  - Medications:Start Gabapentin 300mg qHS and gradually increasing to TID if tolerating.   - Consider right L4/5 TFESI  - Imaging: Reviewed available imaging with patient and answered any questions they had regarding study.Order Flexion-Extension X-rays of the Lumbar spine to rule out any instability. and Order MRI of the Lumbar Spine to help evaluate possible source of pain.  - The patient's pathophysiology was explained in detail with reference to x-rays, models, other visual aids as appropriate.   - Follow up visit: return to clinic in q week after MRI    Jose Marvin MD  02/03/2025

## 2025-02-05 ENCOUNTER — PATIENT MESSAGE (OUTPATIENT)
Dept: PAIN MEDICINE | Facility: CLINIC | Age: 80
End: 2025-02-05
Payer: MEDICARE

## 2025-02-05 RX ORDER — METHYLPREDNISOLONE 4 MG/1
TABLET ORAL
Qty: 21 EACH | Refills: 0 | Status: SHIPPED | OUTPATIENT
Start: 2025-02-05 | End: 2025-02-19 | Stop reason: ALTCHOICE

## 2025-02-09 ENCOUNTER — PATIENT MESSAGE (OUTPATIENT)
Dept: PAIN MEDICINE | Facility: CLINIC | Age: 80
End: 2025-02-09
Payer: MEDICARE

## 2025-02-10 ENCOUNTER — OFFICE VISIT (OUTPATIENT)
Dept: PAIN MEDICINE | Facility: CLINIC | Age: 80
End: 2025-02-10
Payer: MEDICARE

## 2025-02-10 ENCOUNTER — TELEPHONE (OUTPATIENT)
Dept: PAIN MEDICINE | Facility: CLINIC | Age: 80
End: 2025-02-10

## 2025-02-10 VITALS
HEART RATE: 116 BPM | HEIGHT: 66 IN | BODY MASS INDEX: 24.34 KG/M2 | WEIGHT: 151.44 LBS | DIASTOLIC BLOOD PRESSURE: 71 MMHG | SYSTOLIC BLOOD PRESSURE: 105 MMHG

## 2025-02-10 DIAGNOSIS — M54.16 LUMBAR RADICULOPATHY: Primary | ICD-10-CM

## 2025-02-10 DIAGNOSIS — M51.362 DEGENERATION OF INTERVERTEBRAL DISC OF LUMBAR REGION WITH DISCOGENIC BACK PAIN AND LOWER EXTREMITY PAIN: ICD-10-CM

## 2025-02-10 PROCEDURE — 99214 OFFICE O/P EST MOD 30 MIN: CPT | Mod: S$GLB,,, | Performed by: EMERGENCY MEDICINE

## 2025-02-10 PROCEDURE — 99999 PR PBB SHADOW E&M-EST. PATIENT-LVL III: CPT | Mod: PBBFAC,,, | Performed by: EMERGENCY MEDICINE

## 2025-02-10 PROCEDURE — 3288F FALL RISK ASSESSMENT DOCD: CPT | Mod: CPTII,S$GLB,, | Performed by: EMERGENCY MEDICINE

## 2025-02-10 PROCEDURE — 1126F AMNT PAIN NOTED NONE PRSNT: CPT | Mod: CPTII,S$GLB,, | Performed by: EMERGENCY MEDICINE

## 2025-02-10 PROCEDURE — 3074F SYST BP LT 130 MM HG: CPT | Mod: CPTII,S$GLB,, | Performed by: EMERGENCY MEDICINE

## 2025-02-10 PROCEDURE — 1100F PTFALLS ASSESS-DOCD GE2>/YR: CPT | Mod: CPTII,S$GLB,, | Performed by: EMERGENCY MEDICINE

## 2025-02-10 PROCEDURE — 1159F MED LIST DOCD IN RCRD: CPT | Mod: CPTII,S$GLB,, | Performed by: EMERGENCY MEDICINE

## 2025-02-10 PROCEDURE — 3078F DIAST BP <80 MM HG: CPT | Mod: CPTII,S$GLB,, | Performed by: EMERGENCY MEDICINE

## 2025-02-10 NOTE — H&P (VIEW-ONLY)
"                                   Interventional Pain Management - Established       Interval History 02/10/2025:      Original HPI 02/03/2025: Mireille Akins  presents to the clinic for the evaluation of the above pain. The pain started 1 week ago     Original Pain Description:  The pain is located in the lower back and is radiating to the right leg around to anterior and to top of foot . The pain is described as aching, burning, sharp, shooting, stabbing, and throbbing. Exacerbating factors: Laying, Night Time, Morning, Flexing, Lifting, and Getting out of bed/chair. Mitigating factors heat and medications. Symptoms interfere with daily activity. The patient feels like symptoms have been worsening. Patient denies night fever/night sweats, urinary incontinence, bowel incontinence, significant weight loss, significant motor weakness, and loss of sensations. Dr. Love has been treating her right hip bursitis including recent injection. She was placed in traction at her last PT appointment 11 days ago and the pain started      PAIN SCORES:  Best: Pain is 2  Current: Pain is 0  Worst: Pain is 10    6 weeks of Conservative therapy:  PT: Participating  Chiro:  HEP: Participating    Treatments / Medications:   Flexeril 10mg     Antiplatelets/Anticoagulants:  Aspirin 81 mg  Plavix 75 mg    Interventional Pain Procedures:   Laminectomy in 90s    IMAGING:        Medications/Allergies: See med card    ROS:  GENERAL: No fever. No chills. No fatigue. Denies weight loss. Denies weight gain.  Back / musculoskeletal / neuro : See HPI    VITALS:   Vitals:    02/10/25 1406   BP: 105/71   Pulse: (!) 116   Weight: 68.7 kg (151 lb 7.3 oz)   Height: 5' 6" (1.676 m)   PainSc: 0-No pain   PainLoc: Back       Body mass index is 24.45 kg/m².      2/10/2025     2:06 PM 2/3/2025     2:14 PM   Last 3 PDI Scores   Pain Disability Index (PDI) 14 35       PHYSICAL EXAM:   GENERAL: Well appearing, in no acute distress, alert and " "oriented x3.  PSYCH:  Mood and affect appropriate.  SKIN: Skin color, texture, turgor normal, no rashes or lesions.  HEENT:  Normocephalic, atraumatic. Cranial nerves grossly intact.  NECK: No pain to palpation over the cervical paraspinous muscles. No pain to palpation over facets. No pain with neck flexion, extension, or lateral flexion.   PULM: No evidence of respiratory difficulty, symmetric chest rise.  GI:  Non-distended  BACK:  Limited range of motion. No pain to palpation over the spinous processes.  Pain with axial loading bilaterally.  Tenderness to palpation right SI  EXTREMITIES: No deformities, edema, or skin discoloration.   MUSCULOSKELETAL: Shoulder, hip, and knee provocative maneuvers are negative. No atrophy is noted.  NEURO: Sensation is equal and appropriate bilaterally. Bilateral upper and lower extremity strength is normal and symmetric. Bilateral upper and lower extremity coordination and muscle stretch reflexes are physiologic and symmetric. Plantar response are downgoing. Straight leg raising in the supine position is positive to radicular pain on right  GAIT: normal.      LABS:    No results found for: "LABA1C", "HGBA1C"    Lab Results   Component Value Date    CREATININE 0.8 04/22/2024         ASSESSMENT: 79 y.o. year old female with pain, consistent with:    Encounter Diagnoses   Name Primary?    Lumbar radiculopathy Yes    Degeneration of intervertebral disc of lumbar region with discogenic back pain and lower extremity pain          DISCUSSION: Mireille Akins is a patient who comes to us with acute lower back pain most consistent with acute radiculopathy.    PLAN:  - I have stressed the importance of physical activity and a home exercise plan to help with pain and improve health.  - Patient can continue with medications for now since they are providing benefits, using them appropriately, and without side effects.  - Counseled patient regarding the importance of activity " modification and constant sleeping habits.  - Anticoagulation use: Yes aspirin and Plavix  - Medications:  Increased p.m. dose of gabapentin to 600 mg total   Interventions: Schedule for a Right  L3/4 and L4/5 Transforaminal epidural steroid injection  to help with their pain and progress with a home exercise plan.. Explained the risks and benefits of the procedure in detail with the patient today in clinic along with alternative treatment options, and the patient elected to pursue the intervention at this time.   - Imaging: Reviewed available imaging with patient and answered any questions they had regarding study.  - The patient's pathophysiology was explained in detail with reference to x-rays, models, other visual aids as appropriate.   - Follow up visit: return to clinic 3-4 weeks after procedure    Jose Marvin MD  02/10/2025

## 2025-02-10 NOTE — PROGRESS NOTES
"                                   Interventional Pain Management - Established       Interval History 02/10/2025:      Original HPI 02/03/2025: Mireille Akins  presents to the clinic for the evaluation of the above pain. The pain started 1 week ago     Original Pain Description:  The pain is located in the lower back and is radiating to the right leg around to anterior and to top of foot . The pain is described as aching, burning, sharp, shooting, stabbing, and throbbing. Exacerbating factors: Laying, Night Time, Morning, Flexing, Lifting, and Getting out of bed/chair. Mitigating factors heat and medications. Symptoms interfere with daily activity. The patient feels like symptoms have been worsening. Patient denies night fever/night sweats, urinary incontinence, bowel incontinence, significant weight loss, significant motor weakness, and loss of sensations. Dr. Love has been treating her right hip bursitis including recent injection. She was placed in traction at her last PT appointment 11 days ago and the pain started      PAIN SCORES:  Best: Pain is 2  Current: Pain is 0  Worst: Pain is 10    6 weeks of Conservative therapy:  PT: Participating  Chiro:  HEP: Participating    Treatments / Medications:   Flexeril 10mg     Antiplatelets/Anticoagulants:  Aspirin 81 mg  Plavix 75 mg    Interventional Pain Procedures:   Laminectomy in 90s    IMAGING:        Medications/Allergies: See med card    ROS:  GENERAL: No fever. No chills. No fatigue. Denies weight loss. Denies weight gain.  Back / musculoskeletal / neuro : See HPI    VITALS:   Vitals:    02/10/25 1406   BP: 105/71   Pulse: (!) 116   Weight: 68.7 kg (151 lb 7.3 oz)   Height: 5' 6" (1.676 m)   PainSc: 0-No pain   PainLoc: Back       Body mass index is 24.45 kg/m².      2/10/2025     2:06 PM 2/3/2025     2:14 PM   Last 3 PDI Scores   Pain Disability Index (PDI) 14 35       PHYSICAL EXAM:   GENERAL: Well appearing, in no acute distress, alert and " Nutrition Care Plan    Nutrition Diagnosis:   Inadequate intake related to dysphagia due to hx CVA as evidenced by NPO, requires tube feeding for nutrition support.  Unintentional weight loss  related to inadequate intake as evidenced by wt decreased 10.4kg (15%) since 2018 and is down a total 18.6kg (24%) since 2018 per chart review.     Intervention:  Enteral formula/solution: Continue TF per below.  Enteral Nutrition Formula: 1.5 Calorie Formula - with fiber  Current Rate: 250ml's q 4 hrs  Access Site: PEG/G-tube  Calories Provided by Tube Feedin  Protein Provided by Tube Feeding  Free Water Provided by Tube Feedinml's  Goal Rate: 250ml's q 4 hrs   Feeding tube flush:   Continue 150ml's water flush q 4hrs.   IV fluids:   0.9 NS at 10ml/hr; adjust as needed.     Monitoring and Evaluation:  Enteral formula/solution:   Goal to meet nutrient needs via PEG TF.  Tolerating bolus feeds. Currently on BiPAP.  BM x 3 yesterday.        "oriented x3.  PSYCH:  Mood and affect appropriate.  SKIN: Skin color, texture, turgor normal, no rashes or lesions.  HEENT:  Normocephalic, atraumatic. Cranial nerves grossly intact.  NECK: No pain to palpation over the cervical paraspinous muscles. No pain to palpation over facets. No pain with neck flexion, extension, or lateral flexion.   PULM: No evidence of respiratory difficulty, symmetric chest rise.  GI:  Non-distended  BACK:  Limited range of motion. No pain to palpation over the spinous processes.  Pain with axial loading bilaterally.  Tenderness to palpation right SI  EXTREMITIES: No deformities, edema, or skin discoloration.   MUSCULOSKELETAL: Shoulder, hip, and knee provocative maneuvers are negative. No atrophy is noted.  NEURO: Sensation is equal and appropriate bilaterally. Bilateral upper and lower extremity strength is normal and symmetric. Bilateral upper and lower extremity coordination and muscle stretch reflexes are physiologic and symmetric. Plantar response are downgoing. Straight leg raising in the supine position is positive to radicular pain on right  GAIT: normal.      LABS:    No results found for: "LABA1C", "HGBA1C"    Lab Results   Component Value Date    CREATININE 0.8 04/22/2024         ASSESSMENT: 79 y.o. year old female with pain, consistent with:    Encounter Diagnoses   Name Primary?    Lumbar radiculopathy Yes    Degeneration of intervertebral disc of lumbar region with discogenic back pain and lower extremity pain          DISCUSSION: Mireille Akins is a patient who comes to us with acute lower back pain most consistent with acute radiculopathy.    PLAN:  - I have stressed the importance of physical activity and a home exercise plan to help with pain and improve health.  - Patient can continue with medications for now since they are providing benefits, using them appropriately, and without side effects.  - Counseled patient regarding the importance of activity " modification and constant sleeping habits.  - Anticoagulation use: Yes aspirin and Plavix  - Medications:  Increased p.m. dose of gabapentin to 600 mg total   Interventions: Schedule for a Right  L3/4 and L4/5 Transforaminal epidural steroid injection  to help with their pain and progress with a home exercise plan.. Explained the risks and benefits of the procedure in detail with the patient today in clinic along with alternative treatment options, and the patient elected to pursue the intervention at this time.   - Imaging: Reviewed available imaging with patient and answered any questions they had regarding study.  - The patient's pathophysiology was explained in detail with reference to x-rays, models, other visual aids as appropriate.   - Follow up visit: return to clinic 3-4 weeks after procedure    Jose Marvin MD  02/10/2025

## 2025-02-10 NOTE — TELEPHONE ENCOUNTER
----- Message from Jose Marvin MD sent at 2/10/2025  2:29 PM CST -----  Regarding: Order for SOPHIE MEDRANO    Patient Name: SOPHIE MEDRANO([id number])  Sex: Female  : 1945      PCP: STANISLAW QUARLES    Center: [facility] CENTRAL BILLING OFFICE     Types of orders made on 02/10/2025: Procedure Request    Order Date:2/10/2025  Ordering User:JOSE MARVIN [[id number]]  Encounter Provider:Jose Marvin MD [[id number]]  Authorizing Provider: Jose Marvin MD [[id number]]  Department:[facility][[id number]]    Common Order Information  Procedure -> Transforaminal Injection (Specify level and laterality) Cmt: Right             L3/4 and L4/5    Order Specific Information  Order: Procedure Order to Pain Management [Custom: [id number]]  Order #:          [id number]: 1 FUTURE    Priority: Routine  Class: Clinic Performed    Future Order Information      Expires on:02/10/2026            Expected by:02/10/2025                   Associated Diagnoses      M54.16 Lumbar radiculopathy      M51.362 Degeneration of intervertebral disc of lumbar region with       discogenic back pain and lower extremity pain      Physician -> Yon         Facility Name: -> [facility]           Priority: Routine  Class: Clinic Performed    Future Order Information      Expires on:02/10/2026            Expected by:02/10/2025                   Associated Diagnoses      M54.16 Lumbar radiculopathy      M51.362 Degeneration of intervertebral disc of lumbar region with       discogenic back pain and lower extremity pain      Procedure -> Transforaminal Injection (Specify level and laterality) Cmt:                 Right L3/4 and L4/5        Physician -> Yon         Facility Name: -> [facility]

## 2025-02-13 ENCOUNTER — E-CONSULT (OUTPATIENT)
Dept: CARDIOLOGY | Facility: CLINIC | Age: 80
End: 2025-02-13
Payer: MEDICARE

## 2025-02-13 DIAGNOSIS — Z98.890 PERIPHERAL ARTERIAL DISEASE WITH HISTORY OF REVASCULARIZATION: Primary | ICD-10-CM

## 2025-02-13 DIAGNOSIS — I73.9 PERIPHERAL ARTERIAL DISEASE WITH HISTORY OF REVASCULARIZATION: Primary | ICD-10-CM

## 2025-02-13 NOTE — PRE-PROCEDURE INSTRUCTIONS
Patient reviewed on 2/13/2025.  Okay to proceed at [place]. The following pre-procedure instructions and arrival time have been sent to patient portal for review.  Patient confirmed receiving pre-procedure instructions via [facility] portal.     Dear Mireille,        Please read over the following pre-procedure instructions in it's entirety as there is helpful information here to get you well prepared for your upcoming procedure.     You are scheduled for a procedure with Dr. Marvin on 2/17/25.     [facility] is located at the corner of [facility] and [facility]. It is in the [place] next to [facility]. The address is: [address] [place] [address]. Take the elevator to the 2nd floor.      Registration check in time: 10:40 am  Scheduled procedure time: 11:40 am     You are scheduled to receive:_______Oral sedation                                                                 ___X___IV Sedation                                                                 _______No Sedation                                                                                           If you are receiving any sedation, you CANNOT drive yourself and must have a responsible friend or family member (no rideshare) to drive you home.     You should take any medications that you routinely take for blood pressure, heart medications, thyroid, cholesterol, etc.      *The fasting restrictions are dependent on whether or not you are receiving sedation. Sedation is not available for all procedures.      Your fasting instructions for sedation patients are as follow:  IV sedation. Nothing to eat after midnight the night prior to procedure. Patients are encouraged to consume clear liquids up to 2 hours prior to scheduled arrival time. -Clear liquids include Gatorade, water, soda, black coffee or tea (no milk or creamer), and clear juices. - Clear liquids do NOT include anything with pulp or food particles (chicken broth,  ice cream, yogurt, Jello, etc.) You CANNOT drive yourself and must have a .            If you are on blood thinners, you need to follow the anticoagulation instructions that had been discussed previously. You should only stop the blood thinners if it was approved by your primary care physician or your cardiologist. In the event that you are not able to stop your blood thinners, a blood thinner was not listed on your medication list, or we were not able to get clearance from your cardiologist, then the procedure may have to be postponed/canceled.      IF you were told to stop your blood thinners, this is how long you should generally hold some of the more common ones. Remember that stopping blood thinners is only necessary for certain procedures. If you are unsure of your instructions, please call us.   Aspirin - 5 days  Plavix/Clopidogrel - 7 days  Warfarin / Coumadin - 5 days  Eliquis - 3 days  Pradaxa/Dabigatran - 4 days  Xarelto/Rivaroxaban - 3 days        HOLD all non-insulin injections (shots) until after surgery (Semaglutide, Tirzepatide, Ozempic, Mounjaro, Trulicity, Victoza, Byetta, Wegovy and Adlyxin) (Total of 7 days prior)        If you are a diabetic, do not take your medication if you will be fasting, but bring it with you. Please plan on being here for roughly 2-3 hours. Please note that most procedures will not be performed if you blood sugar is >200.     Please call us if you have been sick (running fever, having any flu-like symptoms) or have been taking ANTIBIOTICS in the past 2 weeks or had any outpatient procedures other than with us (colonoscopy, endoscopy, OBGYN, dental, etc.).      If you have been previously COVID positive, you will need to hold off on your procedure until you are symptom free for 10 days. If you did not have any symptoms, you can have your procedure 10 days from your positive test result.         On the morning of your procedure:  *HOLD ALL VITAMINS, MINERALS, HERBS  (INCLUDING HERBAL TEAS) AND SUPPLEMENTS  *SHOWER WITH ANTIBACTERIAL SOAP (EX. DIAL) NIGHT BEFORE AND MORNING OF PROCEDURE  *DO NOT APPLY ANY LOTIONS, OILS, POWDERS, PERFUME/COLOGNE, OINTMENTS, GELS, CREAMS, MAKEUP OR DEODORANT TO YOUR SKIN MORNING OF PROCEDURE  *LEAVE JEWELRY AND ANY VALUABLES AT HOME  *WEAR LOOSE COMFORTABLE CLOTHING      In the event that you are running late or need to reschedule on the day of your procedure, please contact the pre-op desk at [phone].          Please reply to this portal message as receipt of delivery.     Thank you,  [facility] &  Erin, KASEYN  [facility]  Pre-Admit

## 2025-02-13 NOTE — CONSULTS
[facility] - Cardiology [address]  Response for E-Consult     Patient Name: Mireille Akins  MRN: [id number]  Primary Care Provider: Haleigh Seo MD   Requesting Provider: Jose Marvin MD  Consults    Recommendation: ok for her to hold her Plavix 5 days and ASA 3 days prior to this injection.  Restart after the procedure when safe from a bleeding perspective.    Additional future steps to consider:     Total time of Consultation: 5 minute    I did not speak to the requesting provider verbally about this.     *This eConsult is based on the clinical data available to me and is furnished without benefit of a physical examination. The eConsult will need to be interpreted in light of any clinical issues or changes in patient status not available to me at the time of filing this eConsults. Significant changes in patient condition or level of acuity should result in immediate formal consultation and reevaluation. Please alert me if you have further questions.    Thank you for this eConsult referral.     Fernando Mayorga MD PhD  [facility] - [facility] [address]

## 2025-02-14 NOTE — DISCHARGE INSTRUCTIONS
[facility] [place]  Dr. Jose Marvin  Messaging service # [phone]    POST-PROCEDURE INSTRUCTIONS:    Today you had an injection that included a steroid medications.  The steroid may or may not have been mixed with a local anesthetic when it was injected.   If the injection was in the neck, you may feel some pressure, numbness, or slight weakness in the arm after the procedure for a short period of time (this is a normal response), if this persists for longer than 1 day please contact our office or go to the emergency room.  If the injection was in the low back, you may feel some pressure, numbness, or slight weakness in the leg after the procedure for a short period of time (this is a normal response), if this persists for longer than 1 day please contact our office or go to the emergency room.  You may get side effects from the steroid.  This is not uncommon.  Symptoms include: elevated blood sugar, elevated blood pressure, headache, flushing, nausea, insomnia.  These symptoms are transient and will resolve within 1-3 days.  If symptoms last longer than this please contact our office or head to the emergency room.  Steroid medications can take anywhere from 3-14 days to take effect (rarely longer).  You may notice that your pain worsens for a short period of time after the injection, this would not be unusual due to the pressure and trauma from the needle.    If you do not have a follow up appointment scheduled, please contact my office (or the office of the physician who referred you for the procedure) to get a post-procedure follow up scheduled 2-4 weeks after the procedure.  This can be done as a virtual visit if that is more convenient for you.      What you need to do:    Keep a record of your response to the injection you had today.    How much relief did you get?   When did the relief start and how long did it last?  Were you able to decrease the use of any of your pain  medications?  Were you able to increase your level of activity?  How long did the relief last?    What to watch out for:    If you experience any of the following symptoms after your procedure, please notify the messaging service immediately (see above for contact information):   fever (increased oral temperature)   bleeding or swelling at the injection site,    drainage, rash or redness at the injection site    possible signs of infection    increased pain at the injection site   worsening of your usual pain   severe headache   new or worsening numbness    new arm and/or leg weakness, or    changes in bowel and/or bladder function: urinating or defecating on yourself and not knowing that you did it.    PLEASE FOLLOW ALL INSTRUCTIONS CAREFULLY     Do not engage in strenuous activity (e.g., lifting or pushing heavy objects or repeated bending) for 24 hours.     Do not take a bath, swim or use Jacuzzi for 24 hours after procedure. (A shower is fine).   Remove any Band-Aids when you get home.    Use cold/ice, as needed for comfort.  We recommend the use of cold therapy alternating on for 20 minutes, off for 20 minutes.    Do not apply direct heat (heating pad or heat packs) to the injection site for 24 hours.     Resume your usual medications, unless instructed otherwise by your Pain Physician.     If you are on warfarin (Coumadin) or other blood thinner, resume this medication as instructed by your prescribing Physician.    IF AT ANY POINT YOU ARE VERY CONCERNED ABOUT YOUR SYMPTOMS, or you have an urgent or emergent issue after between 5pm and 7am or on weekends, please contact  the on call provider at [phone].    If you develop worsening pain, weakness, numbness, lose bowel or bladder control (i.e., having an accident where you did not even know you had to go to the bathroom and suddenly noticed you soiled yourself), saddle anesthesia (a loss of sensation restricted to the area of the buttocks, anus and between  the legs -- i.e., those parts of your body that would touch a saddle if you were sitting on one) you need to go immediately to the emergency department for evaluation and treatment.    ----------------------------------------------------------------------------------------------------------------------------------------------------------------  If you received Sedation please read the following instructions:  POST SEDATION INSTRUCTIONS    Today you received intravenous medication (also known as sedation) that was used to help you relax and/or decrease discomfort during your procedure. This medication will be acting in your body for the next 24 hours, so you might feel a little tired or sleepy. This feeling will slowly wear off.   Common side effects associated with these medications include: drowsiness, dizziness, sleepiness, confusion, feeling excited, difficulty remembering things, lack of steadiness with walking or balance, loss of fine muscle control, slowed reflexes, difficulty focusing, and blurred vision.  Some over-the-counter and prescription medications (e.g., muscle relaxants, opioids, mood-altering medications, sedatives/hypnotics, antihistamines) can interact with the intravenous medication you received and cause an increased risk of the side effects listed above in addition to other potentially life threatening side effects. Use extreme caution if you are taking such medications, and consult with your Pain Physician or prescribing physician if you have any questions.  For the next 12-24 hours:    DO NOT--Drive a car, operate machinery or power tools   DO NOT--Drink any alcoholic beverages (not even beer), they may dangerously increase the risk of side effects.    DO NOT--Make any important legal or business decisions or sign important documents.  We advise you to have someone to assist you at home. Move slowly and carefully. Do not make sudden changes in position. Be aware of dizziness or  light-headedness and move accordingly.   If you seek medical treatment within 24 hours, let the nurse or doctor caring for you know that you have received the above medications. If you have any questions or concerns related to your sedation or treatment today please contact us.

## 2025-02-17 ENCOUNTER — HOSPITAL ENCOUNTER (OUTPATIENT)
Facility: HOSPITAL | Age: 80
Discharge: HOME | End: 2025-02-17
Attending: EMERGENCY MEDICINE | Admitting: EMERGENCY MEDICINE
Payer: MEDICARE

## 2025-02-17 VITALS
TEMPERATURE: 98 F | OXYGEN SATURATION: 100 % | HEART RATE: 80 BPM | WEIGHT: 154 LBS | BODY MASS INDEX: 24.75 KG/M2 | RESPIRATION RATE: 16 BRPM | DIASTOLIC BLOOD PRESSURE: 81 MMHG | HEIGHT: 66 IN | SYSTOLIC BLOOD PRESSURE: 133 MMHG

## 2025-02-17 DIAGNOSIS — G89.29 CHRONIC PAIN: ICD-10-CM

## 2025-02-17 PROCEDURE — 64483 NJX AA&/STRD TFRM EPI L/S 1: CPT | Mod: RT | Performed by: EMERGENCY MEDICINE

## 2025-02-17 PROCEDURE — 63600175 PHARM REV CODE 636 W HCPCS: Performed by: EMERGENCY MEDICINE

## 2025-02-17 PROCEDURE — 25500020 PHARM REV CODE 255: Performed by: EMERGENCY MEDICINE

## 2025-02-17 PROCEDURE — 64484 NJX AA&/STRD TFRM EPI L/S EA: CPT | Mod: RT | Performed by: EMERGENCY MEDICINE

## 2025-02-17 RX ORDER — LIDOCAINE HYDROCHLORIDE 10 MG/ML
INJECTION, SOLUTION EPIDURAL; INFILTRATION; INTRACAUDAL; PERINEURAL
Status: DISCONTINUED | OUTPATIENT
Start: 2025-02-17 | End: 2025-02-17 | Stop reason: HOSPADM

## 2025-02-17 RX ORDER — FENTANYL CITRATE 50 UG/ML
INJECTION, SOLUTION INTRAMUSCULAR; INTRAVENOUS
Status: DISCONTINUED | OUTPATIENT
Start: 2025-02-17 | End: 2025-02-17 | Stop reason: HOSPADM

## 2025-02-17 RX ORDER — DEXAMETHASONE SODIUM PHOSPHATE 10 MG/ML
INJECTION, SOLUTION INTRA-ARTICULAR; INTRALESIONAL; INTRAMUSCULAR; INTRAVENOUS; SOFT TISSUE
Status: DISCONTINUED | OUTPATIENT
Start: 2025-02-17 | End: 2025-02-17 | Stop reason: HOSPADM

## 2025-02-17 RX ORDER — MIDAZOLAM HYDROCHLORIDE 1 MG/ML
INJECTION INTRAMUSCULAR; INTRAVENOUS
Status: DISCONTINUED | OUTPATIENT
Start: 2025-02-17 | End: 2025-02-17 | Stop reason: HOSPADM

## 2025-02-17 RX ORDER — LIDOCAINE HYDROCHLORIDE 20 MG/ML
INJECTION, SOLUTION EPIDURAL; INFILTRATION; INTRACAUDAL; PERINEURAL
Status: DISCONTINUED | OUTPATIENT
Start: 2025-02-17 | End: 2025-02-17 | Stop reason: HOSPADM

## 2025-02-17 NOTE — OP NOTE
Lumbar Transforaminal Epidural Steroid Injection under Fluoroscopic Guidance    The procedure, risks, benefits, and options were discussed with the patient. There are no contraindications to the procedure. The patent expressed understanding and agreed to the procedure. Informed written consent was obtained prior to the start of the procedure and can be found in the patient's chart.    PATIENT NAME: Mireille Akins   MRN: [id number]     DATE OF PROCEDURE: 02/17/2025    PROCEDURE:  Right  L3/4 and L4/5 Lumbar Transforaminal Epidural Steroid Injection under Fluoroscopic Guidance    PRE-OP DIAGNOSIS: Lumbar radiculopathy [M54.16] Lumbar radiculopathy [M54.16]    POST-OP DIAGNOSIS: Same    PHYSICIAN: Jose Marvin MD    MEDICATIONS INJECTED: Preservative-free Decadron 10mg with 5cc of Lidocaine 1% MPF     LOCAL ANESTHETIC INJECTED: Xylocaine 2%     SEDATION: Versed 1mg and Fentanyl 50mcg                                                                                                                                                                                     Conscious sedation ordered by M.D. Patient re-evaluation prior to administration of conscious sedation. No changes noted in patient's status from initial evaluation. The patient's vital signs were monitored by RN and patient remained hemodynamically stable throughout the procedure.    Event Time In   Sedation Start 1147   Sedation End 1154       ESTIMATED BLOOD LOSS: None    COMPLICATIONS: None    TECHNIQUE: Time-out was performed to identify the patient and procedure to be performed. With the patient laying in a prone position, the surgical area was prepped and draped in the usual sterile fashion using ChloraPrep and a fenestrated drape.The levels were determined under fluoroscopy guidance. Skin anesthesia was achieved by injecting Lidocaine 2% over the injection sites. The transforaminal spaces were then approached with a 25 gauge, 3.5 inch spinal quinke  needle that was introduced under fluoroscopic guidance in the AP and Lateral views. Once the needle tip was in the area of the transforaminal space, and there was no blood, CSF or paraesthesias, contrast dye Omnipaque (300mg/mL) was injected to confirm placement and there was no vascular runoff. Fluoroscopic imaging in the AP and lateral views revealed a clear outline of the spinal nerve with proximal spread of agent through the neural foramen into the epidural space. 3 mL of the medication mixture listed above was injected slowly at each site. Displacement of the radio opaque contrast after injection of the medication confirmed that the medication went into the area of the transforaminal spaces. The needles were removed and bleeding was nil. A sterile dressing was applied. No specimens collected. The patient tolerated the procedure well.     The patient was monitored after the procedure in the recovery area. They were given post-procedure and discharge instructions to follow at home. The patient was discharged in a stable condition.      Jose Marvin MD

## 2025-02-17 NOTE — DISCHARGE SUMMARY
Discharge Note  Short Stay      SUMMARY     Admit Date: 2/17/2025    Attending Physician: Jose Marvin      Discharge Physician: Jose Marvin      Discharge Date: 2/17/2025 12:06 PM    Procedure(s) (LRB):  Right L3/4 and L4/5 TFESI (Right)    Final Diagnosis: Lumbar radiculopathy [M54.16]    Disposition: Home or self care    Patient Instructions:   Current Discharge Medication List        CONTINUE these medications which have NOT CHANGED    Details   gabapentin (NEURONTIN) 300 MG capsule Take 1 capsule (300 mg total) by mouth every evening for 7 days, THEN 1 capsule (300 mg total) 2 (two) times daily for 7 days, THEN 1 capsule (300 mg total) 3 (three) times daily for 16 days.  Qty: 69 capsule, Refills: 0      aspirin (ECOTRIN) 81 MG EC tablet Take 1 tablet (81 mg total) by mouth once daily.  Qty: 360 tablet, Refills: 0      clopidogreL (PLAVIX) 75 mg tablet Take 1 tablet (75 mg total) by mouth once daily.  Qty: 90 tablet, Refills: 3    Associated Diagnoses: Peripheral arterial disease with history of revascularization      estradioL (ESTRACE) 2 MG tablet Take 1 tablet (2 mg total) by mouth once daily.  Qty: 90 tablet, Refills: 3    Associated Diagnoses: Postmenopausal      ezetimibe (ZETIA) 10 mg tablet Take 1 tablet (10 mg total) by mouth once daily.  Qty: 90 tablet, Refills: 3      LORazepam (ATIVAN) 1 MG tablet Take 1 tablet (1 mg total) by mouth On call Procedure (for procedure).  Qty: 1 tablet, Refills: 0    Associated Diagnoses: Degeneration of intervertebral disc of lumbar region with discogenic back pain and lower extremity pain; Lumbar radiculopathy      methylPREDNISolone (MEDROL DOSEPACK) 4 mg tablet use as directed  Qty: 21 each, Refills: 0      MULTIVITAMIN W-MINERALS/LUTEIN (CENTRUM SILVER ORAL) Take 1 tablet by mouth once daily.      mupirocin (BACTROBAN) 2 % ointment Apply topically 3 (three) times daily.  Qty: 22 g, Refills: 0      pantoprazole (PROTONIX) 20 MG tablet Take 1 tablet (20 mg  total) by mouth once daily.  Qty: 90 tablet, Refills: 3    Associated Diagnoses: Gastroesophageal reflux disease without esophagitis                 Discharge Diagnosis: Lumbar radiculopathy [M54.16]  Condition on Discharge: Stable with no complications to procedure   Diet on Discharge: Same as before.  Activity: as per instruction sheet.  Discharge to: Home with a responsible adult.  Follow up: 2-4 weeks       Please call my office or pager at [phone] if experienced any weakness or loss of sensation, fever > 101.5, pain uncontrolled with oral medications, persistent nausea/vomiting/or diarrhea, redness or drainage from the incisions, or any other worrisome concerns. If physician on call was not reached or could not communicate with our office for any reason please go to the nearest emergency department

## 2025-02-17 NOTE — PLAN OF CARE
Pt to bay [facility]. Vs stable. Handoff report received from procedural team. dC instructions reviewed with patient. Verbalized understanding. Questions encouraged and answered. PIV removed before dc.

## 2025-02-17 NOTE — INTERVAL H&P NOTE
The patient has been examined and the H&P has been reviewed:    I concur with the findings and no changes have occurred since H&P was written. The patient does have a quarter sized wound on her anterior shin that is covered with scab, no erythema, no edema, no TTP, no discharge. We discussed the risks of proceeding with the intervention, and the patient acknowledged this  opting to proceed.     Anesthesia/Surgery risks, benefits and alternative options discussed and understood by patient/family.          There are no hospital problems to display for this patient.

## 2025-02-18 ENCOUNTER — PATIENT MESSAGE (OUTPATIENT)
Dept: PAIN MEDICINE | Facility: CLINIC | Age: 80
End: 2025-02-18
Payer: MEDICARE

## 2025-02-18 DIAGNOSIS — M54.16 LUMBAR RADICULOPATHY: Primary | ICD-10-CM

## 2025-02-18 DIAGNOSIS — G89.22 CHRONIC POST-THORACOTOMY PAIN: ICD-10-CM

## 2025-02-19 ENCOUNTER — PATIENT MESSAGE (OUTPATIENT)
Dept: PAIN MEDICINE | Facility: CLINIC | Age: 80
End: 2025-02-19

## 2025-02-19 ENCOUNTER — E-VISIT (OUTPATIENT)
Dept: PAIN MEDICINE | Facility: CLINIC | Age: 80
End: 2025-02-19
Payer: MEDICARE

## 2025-02-19 ENCOUNTER — OFFICE VISIT (OUTPATIENT)
Dept: CARDIOLOGY | Facility: CLINIC | Age: 80
End: 2025-02-19
Payer: MEDICARE

## 2025-02-19 ENCOUNTER — TELEPHONE (OUTPATIENT)
Dept: PAIN MEDICINE | Facility: CLINIC | Age: 80
End: 2025-02-19
Payer: MEDICARE

## 2025-02-19 VITALS
HEART RATE: 88 BPM | WEIGHT: 152.13 LBS | HEIGHT: 66 IN | BODY MASS INDEX: 24.45 KG/M2 | DIASTOLIC BLOOD PRESSURE: 68 MMHG | SYSTOLIC BLOOD PRESSURE: 124 MMHG

## 2025-02-19 DIAGNOSIS — E78.2 MIXED HYPERLIPIDEMIA: Primary | ICD-10-CM

## 2025-02-19 DIAGNOSIS — M51.362 DEGENERATION OF INTERVERTEBRAL DISC OF LUMBAR REGION WITH DISCOGENIC BACK PAIN AND LOWER EXTREMITY PAIN: ICD-10-CM

## 2025-02-19 DIAGNOSIS — T46.6X5A MYALGIA DUE TO STATIN: ICD-10-CM

## 2025-02-19 DIAGNOSIS — M79.10 MYALGIA DUE TO STATIN: ICD-10-CM

## 2025-02-19 DIAGNOSIS — Z98.890 PERIPHERAL ARTERIAL DISEASE WITH HISTORY OF REVASCULARIZATION: ICD-10-CM

## 2025-02-19 DIAGNOSIS — I73.9 PERIPHERAL ARTERIAL DISEASE WITH HISTORY OF REVASCULARIZATION: ICD-10-CM

## 2025-02-19 DIAGNOSIS — M54.16 LUMBAR RADICULOPATHY: Primary | ICD-10-CM

## 2025-02-19 PROCEDURE — 1159F MED LIST DOCD IN RCRD: CPT | Mod: CPTII,S$GLB,, | Performed by: INTERNAL MEDICINE

## 2025-02-19 NOTE — PATIENT INSTRUCTIONS
Assessment/Plan:  Mireille Akins is a 79 y.o. female with PAD s/p right SFA PTA/stent placement, who presents for a follow up appointment.    PAD s/p right SFA PTA/stent placement- Mrs. Akins reports no claudication symptoms or tissue loss.  Main issue currently is back pain. She is tolerating bempedoic acid-zetia 180-10 mg daily with no muscle pain or other issues. LDL 91 on 4/22/2024. BLE Arterial Ultrasound on 1/7/2025 revealed mild atherosclerosis of right common femoral artery. Patent stent in mid to distal right superficial femoral artery without any significant stenosis. Three-vessel runoff to the right foot. Mild atherosclerosis of the left superficial femoral artery. Occluded left posterior tibial artery with distal vessel supplied via collateral vessels.  Continue bempedoic acid-zetia 180-10 mg daily, ASA 81 mg daily, and plavix 75 mg daily. Continue walking program with goal of at least 30 minutes a day 5 days per week. Follow up with with Dr. Murry for repeat imaging as scheduled.      Follow up in 6 months

## 2025-02-19 NOTE — PROGRESS NOTES
[facility]      Chief Complaint   Patient presents with    Peripheral Arterial Disease    mixed hyperlipidemia       Patient ID: Mireille Aikns is a 79 y.o. female with PAD s/p right SFA PTA/stent placement, overweight, who presents for a follow up appointment. Pertinent history/events are as follows:     -Pt kindly referred by Dr. Cedeno for evaluation of Peripheral arterial disease.    -At our initial clinic visit on 5/1/2024, Mrs. Akins reports no claudication symptoms or tissue loss.  She reports significant bruising of arms and legs since starting ASA and plavix.  She reports no rash.    Plan:  PAD s/p right SFA PTA/stent placement- Mrs. Akins reports no claudication symptoms or tissue loss.  She reports significant bruising of arms and legs since starting ASA and plavix.  She reports no rash. Pt to start walking program with goal of at least 30 minutes a day 5 days per week.  Continue ASA/Plavix and atorvastatin 40 mg daily.  Check updated lipids with goal LDL of <70.  Follow up with Dr. Murry as scheduled.    Overweight- Encourage diet, exercise, and weight loss.    -At follow up clinic visit on 2/13/2024, Mrs. Akins reports no claudication symptoms or tissue loss.  She reports a recent trip to [place] where she did a lot of walking. She was unable to tolerate statins due to muscle pain. BLE Arterial Ultrasound on 9/10/2024 revealed patent prox/mid SFA stent with doubling of the velocity noted suggesting more than 50% stenosis in the mid right SFA. Hemodynamically significant stenosis in the mid left SFA more than 50%, significant hemodynamic stenosis in the distal left SFA 70-90%. Occluded left anterior tibial artery, collaterals noted to the left DP.  Plan:  PAD s/p right SFA PTA/stent placement- Mrs. Akins reports no claudication symptoms or tissue loss. She was unable to tolerate statins due to muscle pain. BLE Arterial Ultrasound on 9/10/2024 revealed patent  prox/mid SFA stent with doubling of the velocity noted suggesting more than 50% stenosis in the mid right SFA. Hemodynamically significant stenosis in the mid left SFA more than 50%, significant hemodynamic stenosis in the distal left SFA 70-90%. Occluded left anterior tibial artery, collaterals noted to the left DP. Continue walking program with goal of at least 30 minutes a day 5 days per week.Follow up with repeat ultrasound in 3 months with Dr. Murry. Given statin intolerance, will start bempedoic acid-zetia.   Overweight- Encourage diet, exercise, and weight loss.    HPI:  Mrs. Akins reports no claudication symptoms or tissue loss.  Main issue currently is back pain. She is tolerating bempedoic acid-zetia 180-10 mg daily with no muscle pain or other issues. She has lost weight with BMI now 24.55. LDL 91 on 2024. BLE Arterial Ultrasound on 2025 revealed mild atherosclerosis of right common femoral artery. Patent stent in mid to distal right superficial femoral artery without any significant stenosis. Three-vessel runoff to the right foot. Mild atherosclerosis of the left superficial femoral artery. Occluded left posterior tibial artery with distal vessel supplied via collateral  vessels.      Past Medical History:   Diagnosis Date    Angiomyolipoma 2022    Asthma     last attack 15 years ago    PONV (postoperative nausea and vomiting)     Subclinical hypothyroidism      Past Surgical History:   Procedure Laterality Date    ANGIOGRAM, EXTREMITY, UNILATERAL Right 2024    Procedure: ANGIOGRAM, EXTREMITY, UNILATERAL, PTA ,;  Surgeon: Teo Murry MD;  Location: [facility] OR [address] [place] [address];  Service: Vascular;  Laterality: Right;  8.5 min  355.30 mGy  73.2661 Gy.cm  96ml Dye    BACK SURGERY       SECTION      CHOLECYSTECTOMY      HYSTERECTOMY      INJECTION, SPINE, LUMBOSACRAL, TRANSFORAMINAL APPROACH Right 2025    Procedure: Right L3/4 and L4/5 TFESI;  Surgeon: Jose Marvin MD;   Location: [facility]IN [facility]NAGEMENT;  Service: Pain Management;  Laterality: Right;  Plavix 5 days and ASA 3 days    knee scoped      rotator cuff surgery Right     STENT, SUPERFICIAL FEMORAL ARTERY Right 2/21/2024    Procedure: STENT, SUPERFICIAL FEMORAL ARTERY;  Surgeon: Teo Murry MD;  Location: [facility] OR [address] [place] [address];  Service: Vascular;  Laterality: Right;    TONSILLECTOMY       Social History     Socioeconomic History    Marital status:    Tobacco Use    Smoking status: Former     Types: Cigarettes     Start date: 6/2/1965     Passive exposure: Past    Smokeless tobacco: Never    Tobacco comments:     quit at age 20   Substance and Sexual Activity    Alcohol use: Not Currently    Drug use: No    Sexual activity: Yes     Partners: Male     Birth control/protection: See Surgical Hx     Social Drivers of Health     Financial Resource Strain: Low Risk  (9/6/2022)    Overall Financial Resource Strain (CARDIA)     Difficulty of Paying Living Expenses: Not hard at all   Food Insecurity: No Food Insecurity (9/6/2022)    Hunger Vital Sign     Worried About Running Out of Food in the Last Year: Never true     Ran Out of Food in the Last Year: Never true   Transportation Needs: No Transportation Needs (9/6/2022)    PRAPARE - Transportation     Lack of Transportation (Medical): No     Lack of Transportation (Non-Medical): No   Physical Activity: Sufficiently Active (8/10/2020)    Exercise Vital Sign     Days of Exercise per Week: 3 days     Minutes of Exercise per Session: 60 min   Stress: Stress Concern Present (9/6/2022)    [employer] - Occupational Stress Questionnaire     Feeling of Stress : Very much   Housing Stability: Unknown (9/6/2022)    Housing Stability Vital Sign     Unable to Pay for Housing in the Last Year: No     Unstable Housing in the Last Year: No     Family History   Problem Relation Name Age of Onset    Heart disease Mother      Prostate cancer Neg Hx      Kidney  "disease Neg Hx      Breast cancer Neg Hx      Colon cancer Neg Hx      Ovarian cancer Neg Hx         Review of patient's allergies indicates:  No Known Allergies    Medication List with Changes/Refills   Current Medications    ASPIRIN (ECOTRIN) 81 MG EC TABLET    Take 1 tablet (81 mg total) by mouth once daily.    CLOPIDOGREL (PLAVIX) 75 MG TABLET    Take 1 tablet (75 mg total) by mouth once daily.    ESTRADIOL (ESTRACE) 2 MG TABLET    Take 1 tablet (2 mg total) by mouth once daily.    EZETIMIBE (ZETIA) 10 MG TABLET    Take 1 tablet (10 mg total) by mouth once daily.    GABAPENTIN (NEURONTIN) 300 MG CAPSULE    Take 1 capsule (300 mg total) by mouth every evening for 7 days, THEN 1 capsule (300 mg total) 2 (two) times daily for 7 days, THEN 1 capsule (300 mg total) 3 (three) times daily for 16 days.    MULTIVITAMIN W-MINERALS/LUTEIN (CENTRUM SILVER ORAL)    Take 1 tablet by mouth once daily.    PANTOPRAZOLE (PROTONIX) 20 MG TABLET    Take 1 tablet (20 mg total) by mouth once daily.   Discontinued Medications    LORAZEPAM (ATIVAN) 1 MG TABLET    Take 1 tablet (1 mg total) by mouth On call Procedure (for procedure).    METHYLPREDNISOLONE (MEDROL DOSEPACK) 4 MG TABLET    use as directed    MUPIROCIN (BACTROBAN) 2 % OINTMENT    Apply topically 3 (three) times daily.       Review of Systems  Constitution: Denies chills, fever, and sweats.  HENT: Denies headaches or blurry vision.  Cardiovascular: Denies chest pain or irregular heart beat.  Respiratory: Denies cough or shortness of breath.  Gastrointestinal: Denies abdominal pain, nausea, or vomiting.  Musculoskeletal: Denies muscle cramps.  Neurological: Denies dizziness or focal weakness.  Psychiatric/Behavioral: Normal mental status.  Hematologic/Lymphatic: Denies bleeding problem or easy bruising/bleeding.  Skin: Denies rash or suspicious lesions    Physical Examination  /68   Pulse 88   Ht 5' 6" (1.676 m)   Wt 69 kg (152 lb 1.9 oz)   LMP  (LMP Unknown)   " BMI 24.55 kg/m²     Constitutional: No acute distress, conversant  HEENT: Sclera anicteric, Pupils equal, round and reactive to light, extraocular motions intact, Oropharynx clear  Neck: No JVD, no carotid bruits  Cardiovascular: regular rate and rhythm, no murmur, rubs or gallops, normal S1/S2  Pulmonary: Clear to auscultation bilaterally  Abdominal: Abdomen soft, nontender, nondistended, positive bowel sounds  Extremities: No lower extremity edema,   Pulses:  Carotid pulses are 2+ on the right side, and 2+ on the left side.  Radial pulses are 2+ on the right side, and 2+ on the left side.   Femoral pulses are 2+ on the right side, and 2+ on the left side.  Popliteal pulses are 2+ on the right side, and 2+ on the left side.   Dorsalis pedis pulses are 2+ on the right side, and 2+ on the left side.   Posterior tibial pulses are 2+ on the right side, and 2+ on the left side.    Skin: No ecchymosis, erythema, or ulcers  Psych: Alert and oriented x 3, appropriate affect  Neuro: CNII-XII intact, no focal deficits    Labs:  Most Recent Data  CBC:   Lab Results   Component Value Date    WBC 5.28 04/22/2024    HGB 13.4 04/22/2024    HCT 40.4 04/22/2024     04/22/2024    MCV 95 04/22/2024    RDW 13.2 04/22/2024     BMP:   Lab Results   Component Value Date     04/22/2024    K 4.7 04/22/2024     04/22/2024    CO2 26 04/22/2024    BUN 14 04/22/2024    CREATININE 0.8 04/22/2024     (H) 04/22/2024    CALCIUM 9.2 04/22/2024    MG 1.6 08/07/2014     LFTS;   Lab Results   Component Value Date    PROT 6.5 04/22/2024    ALBUMIN 3.3 (L) 04/22/2024    BILITOT 0.4 04/22/2024    AST 26 04/22/2024    ALKPHOS 86 04/22/2024    ALT 26 04/22/2024     COAGS:   Lab Results   Component Value Date    INR 0.9 05/29/2023     FLP:   Lab Results   Component Value Date    CHOL 189 04/22/2024    HDL 82 (H) 04/22/2024    LDLCALC 90.6 04/22/2024    TRIG 82 04/22/2024    CHOLHDL 43.4 04/22/2024     CARDIAC:   Lab Results    Component Value Date    TROPONINI 0.006 07/09/2014       Imaging:    BLE Arterial Ultrasound 1/7/2025:    Mild atherosclerosis of right common femoral artery    Patent stent in mid to distal right superficial femoral artery without any significant stenosis.    Three-vessel runoff to the right foot    Mild atherosclerosis of the left superficial femoral artery    Occluded left posterior tibial artery with distal vessel supplied via collateral  vessels    BLE Arterial Ultrasound 6/13/2024  Focal area of greater than 50% stenosis at the distal right superficial femoral artery.   Patent right superficial femoral artery stent.    BLE Arterial Ultrasound 9/10/2024:    Patent prox/mid SFA stent with doubling of the velocity noted suggesting more than 50% stenosis in the mid right SFA    Hemodynamically significant stenosis in the mid left SFA more than 50%, significant hemodynamic stenosis in the distal left SFA 70-90%    Occluded left anterior tibial artery, collaterals noted to the left DP    JOHNSON Study 3/5/2024:    Right JOHNSON 0.91    Left JOHNSON 0.90    LLE Arterial Ultrasound 3/5/2024:    Diffuse arthrosclerosis    Borderline doubling velocity in the mid left SFA suggest possible 50% stenosis    Occluded left peroneal    Severe damping velocity left posterior tibial artery suggest significant stenosis     Assessment/Plan:  Mireille Akins is a 79 y.o. female with PAD s/p right SFA PTA/stent placement, who presents for a follow up appointment.    PAD s/p right SFA PTA/stent placement- Mrs. Akins reports no claudication symptoms or tissue loss.  Main issue currently is back pain. She is tolerating bempedoic acid-zetia 180-10 mg daily with no muscle pain or other issues. LDL 91 on 4/22/2024. BLE Arterial Ultrasound on 1/7/2025 revealed mild atherosclerosis of right common femoral artery. Patent stent in mid to distal right superficial femoral artery without any significant stenosis. Three-vessel runoff to the  right foot. Mild atherosclerosis of the left superficial femoral artery. Occluded left posterior tibial artery with distal vessel supplied via collateral vessels.  Continue bempedoic acid-zetia 180-10 mg daily, ASA 81 mg daily, and plavix 75 mg daily. Continue walking program with goal of at least 30 minutes a day 5 days per week. Follow up with with Dr. Murry for repeat imaging as scheduled.      Follow up in 6 months     Total duration of face to face visit time 30 minutes.  Total time spent counseling greater than fifty percent of total visit time.  Counseling included discussion regarding imaging findings, diagnosis, possibilities, treatment options, risks and benefits.  The patient had many questions regarding the options and long-term effects.    Fernando Mayorga MD, PhD  Interventional Cardiology

## 2025-02-19 NOTE — PROGRESS NOTES
Dear Mireille,     Thank you for reaching out to me for an E-Visit so we can address your concern regarding: Back Pain (Entered automatically based on patient selection in [employer].)     I have reviewed your chart and read the answers to the questionnaire that you completed.  Based on the information provided, my diagnosis and recommendations are as follows:    Visit Diagnosis    1. Lumbar radiculopathy    2. 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  Steroid medications can take up to a week to begin to kick in and relieve some of the pain. It can take up to 6 weeks before you feel the maximum amount of relief.  You should be on day #2 or 3 of taking the gabapentin twice a day. If it is not making you too sleepy, then I would go ahead a start taking it three times a day now. This will help the pain. I will add a short course of strong antiinflammatory as well. If after 6 weeks, you haven't achieved significant pain relief, our plan B would be to repeat the epidural from a different approach. This will expose a larger though less targeted area to the medication. Often times a second epidural can give you that next level of pain relief    Please let me know if there is something else that you need.  If you send additional messages concerning this illness, please use this message thread to communicate.      Jose Marvin MD

## 2025-02-20 ENCOUNTER — PATIENT MESSAGE (OUTPATIENT)
Dept: PAIN MEDICINE | Facility: CLINIC | Age: 80
End: 2025-02-20
Payer: MEDICARE

## 2025-02-24 LAB
BCS RECOMMENDATION EXT: NORMAL
BMD RECOMMENDATION EXT: NORMAL

## 2025-02-25 ENCOUNTER — PATIENT MESSAGE (OUTPATIENT)
Dept: PAIN MEDICINE | Facility: CLINIC | Age: 80
End: 2025-02-25
Payer: MEDICARE

## 2025-02-27 ENCOUNTER — OFFICE VISIT (OUTPATIENT)
Dept: VASCULAR SURGERY | Facility: CLINIC | Age: 80
End: 2025-02-27
Payer: MEDICARE

## 2025-02-27 ENCOUNTER — HOSPITAL ENCOUNTER (OUTPATIENT)
Dept: VASCULAR SURGERY | Facility: CLINIC | Age: 80
Discharge: HOME | End: 2025-02-27
Attending: SURGERY
Payer: MEDICARE

## 2025-02-27 VITALS
SYSTOLIC BLOOD PRESSURE: 130 MMHG | HEIGHT: 66 IN | DIASTOLIC BLOOD PRESSURE: 76 MMHG | HEART RATE: 70 BPM | WEIGHT: 154.31 LBS | TEMPERATURE: 96 F | BODY MASS INDEX: 24.8 KG/M2

## 2025-02-27 DIAGNOSIS — Z98.890 PERIPHERAL ARTERIAL DISEASE WITH HISTORY OF REVASCULARIZATION: ICD-10-CM

## 2025-02-27 DIAGNOSIS — I73.9 PAD (PERIPHERAL ARTERY DISEASE): ICD-10-CM

## 2025-02-27 DIAGNOSIS — I73.9 PAD (PERIPHERAL ARTERY DISEASE): Primary | ICD-10-CM

## 2025-02-27 DIAGNOSIS — Z98.890 PERIPHERAL ARTERIAL DISEASE WITH HISTORY OF REVASCULARIZATION: Primary | ICD-10-CM

## 2025-02-27 DIAGNOSIS — I73.9 PERIPHERAL ARTERIAL DISEASE WITH HISTORY OF REVASCULARIZATION: Primary | ICD-10-CM

## 2025-02-27 DIAGNOSIS — I73.9 PERIPHERAL ARTERIAL DISEASE WITH HISTORY OF REVASCULARIZATION: ICD-10-CM

## 2025-02-27 DIAGNOSIS — Z01.818 PRE-OP EVALUATION: ICD-10-CM

## 2025-02-27 PROCEDURE — 3288F FALL RISK ASSESSMENT DOCD: CPT | Mod: CPTII,S$GLB,, | Performed by: SURGERY

## 2025-02-27 PROCEDURE — 3075F SYST BP GE 130 - 139MM HG: CPT | Mod: CPTII,S$GLB,, | Performed by: SURGERY

## 2025-02-27 PROCEDURE — 99213 OFFICE O/P EST LOW 20 MIN: CPT | Mod: S$GLB,,, | Performed by: SURGERY

## 2025-02-27 PROCEDURE — 1126F AMNT PAIN NOTED NONE PRSNT: CPT | Mod: CPTII,S$GLB,, | Performed by: SURGERY

## 2025-02-27 PROCEDURE — 93923 UPR/LXTR ART STDY 3+ LVLS: CPT | Mod: S$GLB,,, | Performed by: SURGERY

## 2025-02-27 PROCEDURE — 93926 LOWER EXTREMITY STUDY: CPT | Mod: S$GLB,,, | Performed by: SURGERY

## 2025-02-27 PROCEDURE — 1101F PT FALLS ASSESS-DOCD LE1/YR: CPT | Mod: CPTII,S$GLB,, | Performed by: SURGERY

## 2025-02-27 PROCEDURE — 1159F MED LIST DOCD IN RCRD: CPT | Mod: CPTII,S$GLB,, | Performed by: SURGERY

## 2025-02-27 PROCEDURE — 99999 PR PBB SHADOW E&M-EST. PATIENT-LVL III: CPT | Mod: PBBFAC,,, | Performed by: SURGERY

## 2025-02-27 PROCEDURE — 3078F DIAST BP <80 MM HG: CPT | Mod: CPTII,S$GLB,, | Performed by: SURGERY

## 2025-02-27 RX ORDER — GABAPENTIN 300 MG/1
CAPSULE ORAL
Qty: 69 CAPSULE | Refills: 0 | Status: SHIPPED | OUTPATIENT
Start: 2025-02-27 | End: 2025-02-28 | Stop reason: SDUPTHER

## 2025-02-27 RX ORDER — CELECOXIB 100 MG/1
100 CAPSULE ORAL DAILY
Qty: 30 CAPSULE | Refills: 0 | Status: SHIPPED | OUTPATIENT
Start: 2025-02-27 | End: 2025-03-29

## 2025-02-27 NOTE — PROGRESS NOTES
Mireillejackie Akins  02/27/2025    HPI:  Patient is a 79 y.o. female with a h/o HLD and PAD, s/p  who is here today for evaluation of {presenting problem:28079}      IMP/PLAN:     79 y.o. female with a ***       Luis Lord MD DFS FACS   Vascular/Endovascular Surgery    Time spent personally reviewing the patient's chart, interpreting images and test, and conferring with physicians was {HBtimespent2:71878} mins.

## 2025-02-27 NOTE — PROGRESS NOTES
Returns.  She had a right SFA stent that was placed.  She was doing well.  Except now she has severe back problems.  And her back is absolutely improved a little bit but it was absolutely killing her last time.  She is doing better with it she has had a spinal injection.  Patient returns now she had a duplex scan which shows a velocity increase his from .  We need to do an angiogram possible PTA possible stent.  She understands the procedure she had been through it before.  All is good with her in terms of the procedure.  They are going to [place] in April try to do it earlier.    Left femoral approach to the right SFA possible PTA possible stent.  I will renew her Celebrex.  All their questions were answered they understand the procedure

## 2025-02-28 ENCOUNTER — TELEPHONE (OUTPATIENT)
Dept: PAIN MEDICINE | Facility: CLINIC | Age: 80
End: 2025-02-28

## 2025-02-28 ENCOUNTER — OFFICE VISIT (OUTPATIENT)
Dept: PAIN MEDICINE | Facility: CLINIC | Age: 80
End: 2025-02-28
Payer: MEDICARE

## 2025-02-28 VITALS
SYSTOLIC BLOOD PRESSURE: 134 MMHG | DIASTOLIC BLOOD PRESSURE: 80 MMHG | BODY MASS INDEX: 24.66 KG/M2 | HEART RATE: 80 BPM | WEIGHT: 153.44 LBS | HEIGHT: 66 IN

## 2025-02-28 DIAGNOSIS — M51.362 DEGENERATION OF INTERVERTEBRAL DISC OF LUMBAR REGION WITH DISCOGENIC BACK PAIN AND LOWER EXTREMITY PAIN: ICD-10-CM

## 2025-02-28 DIAGNOSIS — M54.16 LUMBAR RADICULOPATHY: Primary | ICD-10-CM

## 2025-02-28 PROCEDURE — 99999 PR PBB SHADOW E&M-EST. PATIENT-LVL III: CPT | Mod: PBBFAC,,, | Performed by: EMERGENCY MEDICINE

## 2025-02-28 RX ORDER — GABAPENTIN 300 MG/1
CAPSULE ORAL
Qty: 240 CAPSULE | Refills: 1 | Status: SHIPPED | OUTPATIENT
Start: 2025-02-28 | End: 2025-04-29

## 2025-02-28 NOTE — PROGRESS NOTES
Interventional Pain Management - Established       Interval History 02/28/2025:  On 02/10/25 the patient underwent L3/4, L4/5 Right TFESI and reports that the pain has improved. The pain in her groin has improved. The pain on the right side of her knee - localized to proximal lateral tibial ridge. She saw her vascular surgeon who said that her stent needs to be revascularized on the right leg, but doesn't think that her pain is due to the PAD. Patient is taking sabra TID. Currently pain level is 01/10 back and groin, and 5/10 to her lateral knee.     Original HPI 02/03/2025: Mireille Akins  presents to the clinic for the evaluation of the above pain. The pain started 1 week ago     Original Pain Description:  The pain is located in the lower back and is radiating to the right leg around to anterior and to top of foot . The pain is described as aching, burning, sharp, shooting, stabbing, and throbbing. Exacerbating factors: Laying, Night Time, Morning, Flexing, Lifting, and Getting out of bed/chair. Mitigating factors heat and medications. Symptoms interfere with daily activity. The patient feels like symptoms have been worsening. Patient denies night fever/night sweats, urinary incontinence, bowel incontinence, significant weight loss, significant motor weakness, and loss of sensations. Dr. Love has been treating her right hip bursitis including recent injection. She was placed in traction at her last PT appointment 11 days ago and the pain started      PAIN SCORES:  Best: Pain is 2  Current: Pain is 0  Worst: Pain is 10    6 weeks of Conservative therapy:  PT: Participating  Chiro:  HEP: Participating    Treatments / Medications:   Flexeril 10mg   Celebrex 100mg - Dr. Murry  Sabra 300mg TID     Antiplatelets/Anticoagulants:  Aspirin 81 mg  Plavix 75 mg    Interventional Pain Procedures:   Laminectomy in 90s    IMAGING:        Medications/Allergies: See med  "card    ROS:  GENERAL: No fever. No chills. No fatigue. Denies weight loss. Denies weight gain.  Back / musculoskeletal / neuro : See HPI    VITALS:   Vitals:    02/28/25 0941   BP: 134/80   Pulse: 80   Weight: 69.6 kg (153 lb 7 oz)   Height: 5' 6" (1.676 m)   PainSc:   5   PainLoc: Back         Body mass index is 24.77 kg/m².      2/28/2025     9:51 AM 2/10/2025     2:06 PM 2/3/2025     2:14 PM   Last 3 PDI Scores   Pain Disability Index (PDI) 35 14 35       PHYSICAL EXAM:   GENERAL: Well appearing, in no acute distress, alert and oriented x3.  PSYCH:  Mood and affect appropriate.  SKIN: Skin color, texture, turgor normal, no rashes or lesions.  HEENT:  Normocephalic, atraumatic. Cranial nerves grossly intact.  NECK: No pain to palpation over the cervical paraspinous muscles. No pain to palpation over facets. No pain with neck flexion, extension, or lateral flexion.   PULM: No evidence of respiratory difficulty, symmetric chest rise.  GI:  Non-distended  BACK:  Limited range of motion. No pain to palpation over the spinous processes.  Pain with axial loading bilaterally.  Tenderness to palpation right SI  EXTREMITIES: No deformities, edema, or skin discoloration.   MUSCULOSKELETAL: Shoulder, hip, and knee provocative maneuvers are negative. No atrophy is noted.   NEURO: Sensation is equal and appropriate bilaterally. Bilateral upper and lower extremity strength is normal and symmetric. Bilateral upper and lower extremity coordination and muscle stretch reflexes are physiologic and symmetric. Plantar response are downgoing. Straight leg raising in the supine position is positive to radicular pain on right  GAIT: normal.      LABS:    No results found for: "LABA1C", "HGBA1C"    Lab Results   Component Value Date    CREATININE 0.8 04/22/2024       ASSESSMENT: 79 y.o. year old female with pain, consistent with:    Encounter Diagnoses   Name Primary?    Lumbar radiculopathy Yes    Degeneration of intervertebral disc of " lumbar region with discogenic back pain and lower extremity pain      DISCUSSION: Mireille Akins is a patient who comes to us with acute lower back pain most consistent with acute radiculopathy.  Her back and groin pain have resolved, but she has persistent pain in her right lateral upper calf.  She has a pending trip including a cruise to [place] on April 2nd.  We discussed a 2nd HAROLDO to give her further pain relief.  She has a revascularization procedure upcoming which might complicate our ability to hold anticoagulants prior to the procedure.    PLAN:  - I have stressed the importance of physical activity and a home exercise plan to help with pain and improve health.  - Patient can continue with medications for now since they are providing benefits, using them appropriately, and without side effects.  - Counseled patient regarding the importance of activity modification and constant sleeping habits.  - Anticoagulation use: Yes aspirin and Plavix  - Medications:  Refill gabapentin 300/306 100 for 60 days  Interventions: Schedule for a Lumbar Epidural Steroid Injection at L4/5 to help with pain and progress with a home exercise program.. Explained the risks and benefits of the procedure in detail with the patient today in clinic along with alternative treatment options, and the patient elected to pursue the intervention at this time.   - Imaging: Reviewed available imaging with patient and answered any questions they had regarding study.  - The patient's pathophysiology was explained in detail with reference to x-rays, models, other visual aids as appropriate.   - Follow up visit: return to clinic after her trip to [facility]    Jose Marvin MD  02/28/2025

## 2025-02-28 NOTE — TELEPHONE ENCOUNTER
----- Message from Leodan Mavrin MD sent at 2025 10:11 AM CST -----  Regarding: Order for SOPHIE MEDRANO    Patient Name: SPOHIE MEDRANO([id number])  Sex: Female  : 1945      PCP: STANISLAW QUARLES    Center: [facility] CENTRAL BILLING OFFICE     Level of Service:55744     [place] OFFICE/OUTPT VISIT, EST, LEVL IV, 30-39 MIN    Types of orders made on 2025: Medications, Procedure Request    Order Date:2025  Ordering User:LEODAN MARVIN [[id number]]  Encounter Provider:Leodan Marvin MD [[id number]]  Authorizing Provider: Leodan Marvin MD [[id number]]  Department:[facility][[id number]]    Common Order Information  Procedure -> Epidural Injection (specify level) Cmt: L4/5 (aim right)    Order Specific Information  Order: Procedure Order to Pain Management [Custom: [id number]]  Order #:          [id number]: 1 FUTURE    Priority: Routine  Class: Clinic Performed    Future Order Information      Expires on:2026            Expected by:2025                   Comment:Need to ask Dr. Murry    Associated Diagnoses      M54.16 Lumbar radiculopathy      M51.362 Degeneration of intervertebral disc of lumbar region with       discogenic back pain and lower extremity pain      Physician -> marvin         Is patient on anti-coagulants? -> Yes Cmt: Plavix, ASA        Facility Name: -> [facility]           Priority: Routine  Class: Clinic Performed    Future Order Information      Expires on:2026            Expected by:2025                   Comment:Need to ask Dr. Murry    Associated Diagnoses      M54.16 Lumbar radiculopathy      M51.362 Degeneration of intervertebral disc of lumbar region with       discogenic back pain and lower extremity pain      Procedure -> Epidural Injection (specify level) Cmt: L4/5 (aim right)        Physician -> marvin         Is patient on anti-coagulants? -> Yes Cmt: Plavix, ASA        Facility Name: -> [facility]

## 2025-03-05 ENCOUNTER — HOSPITAL ENCOUNTER (OUTPATIENT)
Dept: WOUND CARE | Facility: HOSPITAL | Age: 80
Discharge: HOME | End: 2025-03-05
Attending: SURGERY
Payer: MEDICARE

## 2025-03-05 VITALS
TEMPERATURE: 99 F | WEIGHT: 156.06 LBS | HEART RATE: 89 BPM | BODY MASS INDEX: 25.08 KG/M2 | SYSTOLIC BLOOD PRESSURE: 129 MMHG | DIASTOLIC BLOOD PRESSURE: 67 MMHG | HEIGHT: 66 IN | RESPIRATION RATE: 20 BRPM

## 2025-03-05 DIAGNOSIS — S81.801D TRAUMATIC OPEN WOUND OF RIGHT LOWER LEG WITH DELAYED HEALING: Primary | ICD-10-CM

## 2025-03-05 DIAGNOSIS — Z79.890 HORMONE REPLACEMENT THERAPY (HRT): ICD-10-CM

## 2025-03-05 DIAGNOSIS — N28.89 RENAL MASS, RIGHT: ICD-10-CM

## 2025-03-05 PROCEDURE — 11042 DBRDMT SUBQ TIS 1ST 20SQCM/<: CPT

## 2025-03-05 PROCEDURE — 99203 OFFICE O/P NEW LOW 30 MIN: CPT | Mod: 25

## 2025-03-05 NOTE — PROCEDURES
"Debridement    Date/Time: 3/5/2025 2:00 PM    Performed by: Shari Solis MD  Authorized by: Shari Solis MD    Time out: Immediately prior to procedure a "time out" was called to verify the correct patient, procedure, equipment, support staff and site/side marked as required.    Consent Done?:  Yes (Verbal)    Preparation: Patient was prepped and draped in usual sterile fashion and Patient was prepped and draped with clean technique    Local anesthesia used?: Yes    Local anesthetic:  Topical anesthetic    Wound Details:    Location:  Right leg    Type of Debridement:  Excisional       Length (cm):  1       Width (cm):  0.2       Depth (cm):  0.2       Area (sq cm):  0.16       Percent Debrided (%):  100       Total Area Debrided (sq cm):  0.16    Depth of debridement:  Subcutaneous tissue    Tissue debrided:  Subcutaneous    Instruments:  Forceps and Scissors  Bleeding:  Minimal  Hemostasis Achieved: Yes  Method Used:  Pressure and Silver Nitrate  1st Wound Pain Assessment: 2  "

## 2025-03-05 NOTE — PROGRESS NOTES
"                     Wound Care & Hyperbaric Medicine    Subjective:       Patient ID: Mireille Akins is a 79 y.o. female.    Chief Complaint: Non-healing Wound    Admit to wound care with non-healing wound to right anterior lower leg. Reports she hit her leg getting out the car on the running board 2 months ago. States she applied ointment with a bandage for 1 week and has been leaving wound open to air since."It forms a scab but will not close all the way." Denies pain, fever or signs of infection. Wound debride and silver nitrate x 1 applied tolerated well. Plan of care initiated verbalized understanding.        ROS: no fever or chills         Objective:      Physical Exam  Constitutional:       Appearance: She is well-developed.   HENT:      Head: Normocephalic.   Eyes:      Conjunctiva/sclera: Conjunctivae normal.      Pupils: Pupils are equal, round, and reactive to light.   Cardiovascular:      Rate and Rhythm: Normal rate and regular rhythm.      Heart sounds: Normal heart sounds.   Pulmonary:      Effort: Pulmonary effort is normal.      Breath sounds: Normal breath sounds.   Abdominal:      General: Bowel sounds are normal.      Palpations: Abdomen is soft.   Musculoskeletal:         General: Normal range of motion.      Cervical back: Normal range of motion and neck supple.   Skin:     General: Skin is warm and dry.      Findings: Erythema, lesion and rash present.   Neurological:      Mental Status: She is alert and oriented to person, place, and time.      Deep Tendon Reflexes: Reflexes are normal and symmetric.        Wound 03/05/25 1355 Traumatic Right lower Leg (Active)   03/05/25 1355 Leg   Present on Original Admission: Y   Primary Wound Type: Traumatic   Side: Right   Orientation: lower   Wound Approximate Age at First Assessment (Weeks):    Wound Number:    Is this injury device related?:    Incision Type:    Closure Method:    Wound Description (Comments):    Type:    Additional " Comments:    Ankle-Brachial Index:    Pulses:    Removal Indication and Assessment:    Wound Outcome:    Wound Image   03/05/25 1413   Dressing Appearance Open to air;Dry 03/05/25 1413   Drainage Amount None 03/05/25 1413   Appearance Red;Yellow;Dry 03/05/25 1413   Tissue loss description Full thickness 03/05/25 1413   Red (%), Wound Tissue Color 5 % 03/05/25 1413   Yellow (%), Wound Tissue Color 95 % 03/05/25 1413   Periwound Area Intact;Dry;Ecchymotic;Scar tissue 03/05/25 1413   Wound Edges Undefined 03/05/25 1413   Wound Length (cm) 0.5 cm 03/05/25 1413   Wound Width (cm) 1 cm 03/05/25 1413   Wound Depth (cm) 0.1 cm 03/05/25 1413   Wound Volume (cm^3) 0.026 cm^3 03/05/25 1413   Wound Surface Area (cm^2) 0.39 cm^2 03/05/25 1413   Care Cleansed with:;Sterile normal saline;Debrided 03/05/25 1413   Dressing Applied;Bandaid 03/05/25 1413   Periwound Care Absorptive dressing applied;Dry periwound area maintained 03/05/25 1413   Dressing Change Due 03/07/25 03/05/25 1413         Assessment/Plan:         ICD-10-CM ICD-9-CM   1. Traumatic open wound of right lower leg with delayed healing  S81.801D 891.1         Tissue pathology and/or culture taken:   [] Yes    [x] No   Sharp debridement performed:    [] Yes    [x] No   Labs ordered this visit:    [] Yes    [x] No   Imaging ordered this visit:    [] Yes    [x] No     Is the wound improving?    [] Yes    [x] No   Any signs of infection?    [] Yes    [x] No             Orders Placed This Encounter   Procedures    Change dressing     Right Leg  Cleanse wound with:normal saline  Lidocaine:prn  Primary dressing:mupirocin ointment   Secondary dressing:large band-aid   Frequency:every other day and PRN  Follow-up:1 week      Other Orders:Rx given for mupirocin ointment 3/5/25        Follow up in about 1 week (around 3/12/2025) for  .            This includes face to face time and non-face to face time preparing to see the patient (eg, review of tests),  obtaining and/or reviewing separately obtained history, documenting clinical information in the electronic or other health record, independently interpreting results and communicating results to the patient/family/caregiver, or care coordinator.  Total time spent  > 30 minutes

## 2025-03-06 ENCOUNTER — TELEPHONE (OUTPATIENT)
Dept: PAIN MEDICINE | Facility: CLINIC | Age: 80
End: 2025-03-06
Payer: MEDICARE

## 2025-03-06 NOTE — TELEPHONE ENCOUNTER
----- Message from Teo Murry MD sent at 3/6/2025 12:05 PM CST -----  Regarding: RE: Request to hold Plavix 5 days prior to pain management injection  Ok to hold  ----- Message -----  From: Enid Madera  Sent: 2/28/2025  10:34 AM CST  To: Teo Murry MD; Lovely WIGGINS Staff  Subject: Request to hold Plavix 5 days prior to pain #    Good morning, We have scheduled Mireille Akins for a L4/5 Epidural Steroid Injection on 3/20/25 with Dr Marvin and we are requesting clearance for her to hold her Plavix 5 days prior to this injection, she will be able to continue her daily aspirin. Thank you in advance Enid

## 2025-03-10 ENCOUNTER — PATIENT MESSAGE (OUTPATIENT)
Dept: FAMILY MEDICINE | Facility: CLINIC | Age: 80
End: 2025-03-10
Payer: MEDICARE

## 2025-03-10 ENCOUNTER — TELEPHONE (OUTPATIENT)
Dept: VASCULAR SURGERY | Facility: CLINIC | Age: 80
End: 2025-03-10
Payer: MEDICARE

## 2025-03-10 ENCOUNTER — LAB VISIT (OUTPATIENT)
Dept: LAB | Facility: HOSPITAL | Age: 80
End: 2025-03-10
Attending: FAMILY MEDICINE
Payer: MEDICARE

## 2025-03-10 ENCOUNTER — ANESTHESIA EVENT (OUTPATIENT)
Dept: SURGERY | Facility: HOSPITAL | Age: 80
End: 2025-03-10
Payer: MEDICARE

## 2025-03-10 DIAGNOSIS — E78.2 MIXED HYPERLIPIDEMIA: ICD-10-CM

## 2025-03-10 DIAGNOSIS — E03.8 SUBCLINICAL HYPOTHYROIDISM: ICD-10-CM

## 2025-03-10 DIAGNOSIS — R79.9 ABNORMAL FINDING OF BLOOD CHEMISTRY, UNSPECIFIED: ICD-10-CM

## 2025-03-10 LAB
ESTIMATED AVG GLUCOSE: 100 MG/DL (ref 68–131)
HBA1C MFR BLD: 5.1 % (ref 4–5.6)
TSH SERPL DL<=0.005 MIU/L-ACNC: 2.67 UIU/ML (ref 0.4–4)

## 2025-03-10 PROCEDURE — 84443 ASSAY THYROID STIM HORMONE: CPT | Performed by: FAMILY MEDICINE

## 2025-03-10 PROCEDURE — 80061 LIPID PANEL: CPT | Performed by: FAMILY MEDICINE

## 2025-03-10 PROCEDURE — 85025 COMPLETE CBC W/AUTO DIFF WBC: CPT | Performed by: FAMILY MEDICINE

## 2025-03-10 PROCEDURE — 84075 ASSAY ALKALINE PHOSPHATASE: CPT | Performed by: FAMILY MEDICINE

## 2025-03-10 PROCEDURE — 83036 HEMOGLOBIN GLYCOSYLATED A1C: CPT | Performed by: FAMILY MEDICINE

## 2025-03-10 PROCEDURE — 80069 RENAL FUNCTION PANEL: CPT | Performed by: FAMILY MEDICINE

## 2025-03-10 NOTE — ANESTHESIA PREPROCEDURE EVALUATION
[facility]  Anesthesia Pre-Operative Evaluation         Patient Name: Mireille Akins  YOB: 1945  MRN: [id number]    SUBJECTIVE:     Pre-operative evaluation for Procedure(s) (LRB):  Angiogram Extremity Unilateral (Right)     03/10/2025  Mireille Akins is a 79 y.o. female w/ a significant PMHx of asthma, PONV, angiomyolipoma, subclinical hypothyroid, and RLE PAD s/p right SFA PTA/stent placement. Now presents for left femoral approach angiogram to the right SFA possible PTA possible stent .     BLE Arterial Ultrasound 9/10/2024:    Patent prox/mid SFA stent with doubling of the velocity noted suggesting more than 50% stenosis in the mid right SFA    Hemodynamically significant stenosis in the mid left SFA more than 50%, significant hemodynamic stenosis in the distal left SFA 70-90%    Occluded left anterior tibial artery, collaterals noted to the left DP    she has a current medication list which includes the following long-term medication(s): aspirin, clopidogrel, estradiol, ezetimibe, gabapentin, and pantoprazole.     LDA: None documented.  Vent/Oxygen: None documented  Drips: None documented.    Previous Airway: None documented.  Allergies:  Review of patient's allergies indicates:  No Known Allergies  Medications:     Current Outpatient Medications   Medication Instructions    aspirin (ECOTRIN) 81 mg, Oral, Daily    bempedoic acid-ezetimibe 180-10 mg Tab Take 1 tablet daily.    celecoxib (CELEBREX) 100 mg, Oral, Daily    clopidogreL (PLAVIX) 75 mg, Oral, Daily    estradioL (ESTRACE) 2 mg, Oral, Daily    ezetimibe (ZETIA) 10 mg, Oral, Daily    gabapentin (NEURONTIN) 300 MG capsule Take 1 capsule (300 mg total) by mouth 2 (two) times daily AND 2 capsules (600 mg total) every evening.    MULTIVITAMIN W-MINERALS/LUTEIN (CENTRUM SILVER ORAL) 1 tablet, Daily    pantoprazole (PROTONIX) 20 mg, Oral, Daily     Inpatient Medications:    History:   There are no hospital  problems to display for this patient.    Medical History:  Past Medical History:   Diagnosis Date    Angiomyolipoma 2022    Asthma     last attack 15 years ago    PONV (postoperative nausea and vomiting)     Subclinical hypothyroidism      Surgical History:    has a past surgical history that includes Hysterectomy; Tonsillectomy; Back surgery; knee scoped;  section; Cholecystectomy; rotator cuff surgery (Right); angiogram, extremity, unilateral (Right, 2024); stent, superficial femoral artery (Right, 2024); and injection, spine, lumbosacral, transforaminal approach (Right, 2025).   Social History:    reports being sexually active and has had partner(s) who are male. She reports using the following method of birth control/protection: See Surgical Hx.  reports that she has quit smoking. Her smoking use included cigarettes. She started smoking about 59 years ago. She has been exposed to tobacco smoke. She has never used smokeless tobacco. She reports that she does not currently use alcohol. She reports that she does not use drugs.    OBJECTIVE:   Vital Signs Range:  Wt Readings from Last 1 Encounters:   25 70.8 kg (156 lb 1.4 oz)      BMI Readings from Last 1 Encounters:   25 25.19 kg/m²     BP Readings from Last 3 Encounters:   25 129/67   25 134/80   25 130/76     Pulse Readings from Last 3 Encounters:   25 89   25 80   25 70       Significant Labs:      Component Value Date/Time    WBC 5.28 2024 0813    HGB 13.4 2024 0813    HCT 40.4 2024 0813     2024 0813     2024 0813    K 4.7 2024 0813     2024 0813    CO2 26 2024 0813     (H) 2024 0813    BUN 14 2024 0813    CREATININE 0.8 2024 0813    MG 1.6 2014 0825    CALCIUM 9.2 2024 0813    ALBUMIN 3.3 (L) 2024 0813    PROT 6.5 2024 0813    ALKPHOS 86 2024 0813    BILITOT 0.4  04/22/2024 0813    AST 26 04/22/2024 0813    ALT 26 04/22/2024 0813    INR 0.9 05/29/2023 1203      Please see Results Review for additional labs.     Diagnostic Studies: No relevant studies.    EKG:   Results for orders placed or performed in visit on 02/19/24   SCHEDULED EKG 12-LEAD (to Muse)    Collection Time: 02/19/24 12:41 PM   Result Value Ref Range    QRS Duration 72 ms    [facility] QTC Calculation 420 ms    Narrative    Test Reason : I73.9,    Vent. Rate : 076 BPM     Atrial Rate : 076 BPM     P-R Int : 168 ms          QRS Dur : 072 ms      QT Int : 374 ms       P-R-T Axes : 071 029 053 degrees     QTc Int : 420 ms    Sinus rhythm with occasional Premature ventricular complexes  Septal infarct ,age undetermined  Abnormal ECG  When compared with ECG of 19-FEB-2024 12:38,  Previous ECG has undetermined rhythm, needs review  Confirmed by Nicole COX, Nacho MARSHALL ([id number]) on 2/20/2024 4:37:59 PM    Referred By: SHIRLEY BOYLE           Confirmed By:Nacho Rivera MD     ECHO:  See subjective, if available.  ASSESSMENT/PLAN:      Pre-op Assessment    I have reviewed the Patient Summary Reports.       I have reviewed the Medications.     Review of Systems  Anesthesia Hx:   History of prior surgery of interest to airway management or planning:  Previous anesthesia: General         Personal Hx of Anesthesia complications, Post-Operative Nausea/Vomiting, in the past, but not with recent anesthetics / prophylaxis                    Social:  Non-Smoker       Hematology/Oncology:  Hematology Normal   Oncology Normal                                   Cardiovascular:  Exercise tolerance: good                 ECG has been reviewed. PAD  Septal infarct noted on EKG - patient without any h/o heart disease.  She denies CP/SOB and has good functional status going to the gym multiple times per week.                             Pulmonary:     Denies Asthma.                    Renal/:  Renal/ Normal                 Hepatic/GI:       Denies GERD.                Neurological:  Neurology Normal                                      Endocrine:   Hypothyroidism (subclinical hypothyroidism)              Physical Exam  General: Well nourished    Airway:  Mallampati: I   Mouth Opening: Normal  TM Distance: Normal, at least 6 cm  Tongue: Normal  Neck ROM: Normal ROM    Dental:  In tact        Anesthesia Plan  Type of Anesthesia, risks & benefits discussed:    Anesthesia Type: Gen ETT, Gen Supraglottic Airway, Gen Natural Airway, MAC  Intra-op Monitoring Plan: Standard ASA Monitors  Post Op Pain Control Plan: multimodal analgesia and IV/PO Opioids PRN  Induction:  IV  Airway Plan: Direct and Video, Post-Induction  Informed Consent: Informed consent signed with the Patient and all parties understand the risks and agree with anesthesia plan.  All questions answered.   ASA Score: 3  Day of Surgery Review of History & Physical: H&P Update referred to the surgeon/provider.    Ready For Surgery From Anesthesia Perspective.     .

## 2025-03-10 NOTE — TELEPHONE ENCOUNTER
----- Message from Luz sent at 3/10/2025 11:44 AM CDT -----  Regarding: Appt Access  Contact: [phone]  Patient calling to verify time of procedure. Pls call [phone]

## 2025-03-11 ENCOUNTER — RESULTS FOLLOW-UP (OUTPATIENT)
Dept: FAMILY MEDICINE | Facility: CLINIC | Age: 80
End: 2025-03-11

## 2025-03-11 ENCOUNTER — HOSPITAL ENCOUNTER (OUTPATIENT)
Facility: HOSPITAL | Age: 80
Discharge: HOME | End: 2025-03-11
Attending: SURGERY | Admitting: SURGERY
Payer: MEDICARE

## 2025-03-11 ENCOUNTER — PATIENT OUTREACH (OUTPATIENT)
Dept: ADMINISTRATIVE | Facility: HOSPITAL | Age: 80
End: 2025-03-11
Payer: MEDICARE

## 2025-03-11 ENCOUNTER — ANESTHESIA (OUTPATIENT)
Dept: SURGERY | Facility: HOSPITAL | Age: 80
End: 2025-03-11
Payer: MEDICARE

## 2025-03-11 ENCOUNTER — TELEPHONE (OUTPATIENT)
Dept: VASCULAR SURGERY | Facility: CLINIC | Age: 80
End: 2025-03-11
Payer: MEDICARE

## 2025-03-11 ENCOUNTER — PATIENT MESSAGE (OUTPATIENT)
Dept: PAIN MEDICINE | Facility: CLINIC | Age: 80
End: 2025-03-11
Payer: MEDICARE

## 2025-03-11 VITALS
SYSTOLIC BLOOD PRESSURE: 131 MMHG | BODY MASS INDEX: 25.07 KG/M2 | OXYGEN SATURATION: 100 % | WEIGHT: 156 LBS | HEIGHT: 66 IN | DIASTOLIC BLOOD PRESSURE: 61 MMHG | RESPIRATION RATE: 22 BRPM | HEART RATE: 81 BPM | TEMPERATURE: 98 F

## 2025-03-11 DIAGNOSIS — I77.9 PERIPHERAL ARTERIAL OCCLUSIVE DISEASE: ICD-10-CM

## 2025-03-11 LAB
ALBUMIN SERPL BCP-MCNC: 3.3 G/DL (ref 3.5–5.2)
ALBUMIN SERPL BCP-MCNC: 3.3 G/DL (ref 3.5–5.2)
ALP SERPL-CCNC: 59 U/L (ref 40–150)
ALT SERPL W/O P-5'-P-CCNC: 13 U/L (ref 10–44)
ANION GAP SERPL CALC-SCNC: 12 MMOL/L (ref 8–16)
AST SERPL-CCNC: 27 U/L (ref 10–40)
BASOPHILS # BLD AUTO: 0.05 K/UL (ref 0–0.2)
BASOPHILS NFR BLD: 1.1 % (ref 0–1.9)
BILIRUB DIRECT SERPL-MCNC: 0.2 MG/DL (ref 0.1–0.3)
BILIRUB SERPL-MCNC: 0.4 MG/DL (ref 0.1–1)
BUN SERPL-MCNC: 13 MG/DL (ref 8–23)
CALCIUM SERPL-MCNC: 8.8 MG/DL (ref 8.7–10.5)
CHLORIDE SERPL-SCNC: 104 MMOL/L (ref 95–110)
CHOLEST SERPL-MCNC: 197 MG/DL (ref 120–199)
CHOLEST/HDLC SERPL: 2.6 {RATIO} (ref 2–5)
CO2 SERPL-SCNC: 19 MMOL/L (ref 23–29)
CREAT SERPL-MCNC: 0.7 MG/DL (ref 0.5–1.4)
DIFFERENTIAL METHOD BLD: ABNORMAL
EOSINOPHIL # BLD AUTO: 0.1 K/UL (ref 0–0.5)
EOSINOPHIL NFR BLD: 3 % (ref 0–8)
ERYTHROCYTE [DISTWIDTH] IN BLOOD BY AUTOMATED COUNT: 13.7 % (ref 11.5–14.5)
EST. GFR  (NO RACE VARIABLE): >60 ML/MIN/1.73 M^2
GLUCOSE SERPL-MCNC: 100 MG/DL (ref 70–110)
HCT VFR BLD AUTO: 38 % (ref 37–48.5)
HDLC SERPL-MCNC: 76 MG/DL (ref 40–75)
HDLC SERPL: 38.6 % (ref 20–50)
HGB BLD-MCNC: 12.9 G/DL (ref 12–16)
IMM GRANULOCYTES # BLD AUTO: 0.01 K/UL (ref 0–0.04)
IMM GRANULOCYTES NFR BLD AUTO: 0.2 % (ref 0–0.5)
LDLC SERPL CALC-MCNC: 102.4 MG/DL (ref 63–159)
LYMPHOCYTES # BLD AUTO: 1.6 K/UL (ref 1–4.8)
LYMPHOCYTES NFR BLD: 33.8 % (ref 18–48)
MCH RBC QN AUTO: 33 PG (ref 27–31)
MCHC RBC AUTO-ENTMCNC: 33.9 G/DL (ref 32–36)
MCV RBC AUTO: 97 FL (ref 82–98)
MONOCYTES # BLD AUTO: 0.4 K/UL (ref 0.3–1)
MONOCYTES NFR BLD: 7.4 % (ref 4–15)
NEUTROPHILS # BLD AUTO: 2.6 K/UL (ref 1.8–7.7)
NEUTROPHILS NFR BLD: 54.5 % (ref 38–73)
NONHDLC SERPL-MCNC: 121 MG/DL
NRBC BLD-RTO: 0 /100 WBC
PHOSPHATE SERPL-MCNC: 3.6 MG/DL (ref 2.7–4.5)
PLATELET # BLD AUTO: 244 K/UL (ref 150–450)
PMV BLD AUTO: 10.6 FL (ref 9.2–12.9)
POTASSIUM SERPL-SCNC: 4.3 MMOL/L (ref 3.5–5.1)
PROT SERPL-MCNC: 6.2 G/DL (ref 6–8.4)
RBC # BLD AUTO: 3.91 M/UL (ref 4–5.4)
SODIUM SERPL-SCNC: 135 MMOL/L (ref 136–145)
TRIGL SERPL-MCNC: 93 MG/DL (ref 30–150)
WBC # BLD AUTO: 4.7 K/UL (ref 3.9–12.7)

## 2025-03-11 PROCEDURE — 63600175 PHARM REV CODE 636 W HCPCS

## 2025-03-11 PROCEDURE — 36000706: Performed by: SURGERY

## 2025-03-11 PROCEDURE — 63600175 PHARM REV CODE 636 W HCPCS: Performed by: ANESTHESIOLOGY

## 2025-03-11 PROCEDURE — 36000707: Performed by: SURGERY

## 2025-03-11 PROCEDURE — C1887 CATHETER, GUIDING: HCPCS | Performed by: SURGERY

## 2025-03-11 PROCEDURE — 71000015 HC POSTOP RECOV 1ST HR: Performed by: SURGERY

## 2025-03-11 PROCEDURE — 37226 PR FEM/POPL REVASC W/STENT: CPT | Mod: RT,,, | Performed by: SURGERY

## 2025-03-11 PROCEDURE — 37000008 HC ANESTHESIA 1ST 15 MINUTES: Performed by: SURGERY

## 2025-03-11 PROCEDURE — C1894 INTRO/SHEATH, NON-LASER: HCPCS | Performed by: SURGERY

## 2025-03-11 PROCEDURE — 63600175 PHARM REV CODE 636 W HCPCS: Performed by: SURGERY

## 2025-03-11 PROCEDURE — 75710 ARTERY X-RAYS ARM/LEG: CPT | Mod: 26,59,, | Performed by: SURGERY

## 2025-03-11 PROCEDURE — 25500020 PHARM REV CODE 255: Performed by: SURGERY

## 2025-03-11 PROCEDURE — C1725 CATH, TRANSLUMIN NON-LASER: HCPCS | Performed by: SURGERY

## 2025-03-11 PROCEDURE — 25000003 PHARM REV CODE 250

## 2025-03-11 PROCEDURE — 71000045 HC DOSC ROUTINE RECOVERY EA ADD'L HR: Performed by: SURGERY

## 2025-03-11 PROCEDURE — 71000044 HC DOSC ROUTINE RECOVERY FIRST HOUR: Performed by: SURGERY

## 2025-03-11 PROCEDURE — 37000009 HC ANESTHESIA EA ADD 15 MINS: Performed by: SURGERY

## 2025-03-11 PROCEDURE — C1760 CLOSURE DEV, VASC: HCPCS | Performed by: SURGERY

## 2025-03-11 PROCEDURE — C1769 GUIDE WIRE: HCPCS | Performed by: SURGERY

## 2025-03-11 PROCEDURE — 27201423 OPTIME MED/SURG SUP & DEVICES STERILE SUPPLY: Performed by: SURGERY

## 2025-03-11 RX ORDER — ONDANSETRON HYDROCHLORIDE 2 MG/ML
4 INJECTION, SOLUTION INTRAVENOUS DAILY PRN
Status: DISCONTINUED | OUTPATIENT
Start: 2025-03-11 | End: 2025-03-11 | Stop reason: HOSPADM

## 2025-03-11 RX ORDER — DEXMEDETOMIDINE HYDROCHLORIDE 100 UG/ML
INJECTION, SOLUTION INTRAVENOUS
Status: DISCONTINUED | OUTPATIENT
Start: 2025-03-11 | End: 2025-03-11

## 2025-03-11 RX ORDER — GLUCAGON 1 MG
1 KIT INJECTION
Status: DISCONTINUED | OUTPATIENT
Start: 2025-03-11 | End: 2025-03-11 | Stop reason: HOSPADM

## 2025-03-11 RX ORDER — IODIXANOL 320 MG/ML
INJECTION, SOLUTION INTRAVASCULAR
Status: DISCONTINUED | OUTPATIENT
Start: 2025-03-11 | End: 2025-03-11 | Stop reason: HOSPADM

## 2025-03-11 RX ORDER — PROPOFOL 10 MG/ML
VIAL (ML) INTRAVENOUS
Status: DISCONTINUED | OUTPATIENT
Start: 2025-03-11 | End: 2025-03-11

## 2025-03-11 RX ORDER — PROTAMINE SULFATE 10 MG/ML
INJECTION, SOLUTION INTRAVENOUS
Status: DISCONTINUED | OUTPATIENT
Start: 2025-03-11 | End: 2025-03-11

## 2025-03-11 RX ORDER — PHENYLEPHRINE HYDROCHLORIDE 10 MG/ML
INJECTION INTRAVENOUS
Status: DISCONTINUED | OUTPATIENT
Start: 2025-03-11 | End: 2025-03-11

## 2025-03-11 RX ORDER — LIDOCAINE HYDROCHLORIDE 10 MG/ML
INJECTION, SOLUTION EPIDURAL; INFILTRATION; INTRACAUDAL; PERINEURAL
Status: DISCONTINUED | OUTPATIENT
Start: 2025-03-11 | End: 2025-03-11 | Stop reason: HOSPADM

## 2025-03-11 RX ORDER — CEFAZOLIN SODIUM 1 G/3ML
INJECTION, POWDER, FOR SOLUTION INTRAMUSCULAR; INTRAVENOUS
Status: DISCONTINUED | OUTPATIENT
Start: 2025-03-11 | End: 2025-03-11

## 2025-03-11 RX ORDER — HEPARIN SODIUM 1000 [USP'U]/ML
INJECTION, SOLUTION INTRAVENOUS; SUBCUTANEOUS
Status: DISCONTINUED | OUTPATIENT
Start: 2025-03-11 | End: 2025-03-11

## 2025-03-11 RX ORDER — FENTANYL CITRATE 50 UG/ML
25 INJECTION, SOLUTION INTRAMUSCULAR; INTRAVENOUS EVERY 5 MIN PRN
Status: DISCONTINUED | OUTPATIENT
Start: 2025-03-11 | End: 2025-03-11 | Stop reason: HOSPADM

## 2025-03-11 RX ORDER — FENTANYL CITRATE 50 UG/ML
INJECTION, SOLUTION INTRAMUSCULAR; INTRAVENOUS
Status: DISCONTINUED | OUTPATIENT
Start: 2025-03-11 | End: 2025-03-11

## 2025-03-11 RX ORDER — PROPOFOL 10 MG/ML
VIAL (ML) INTRAVENOUS CONTINUOUS PRN
Status: DISCONTINUED | OUTPATIENT
Start: 2025-03-11 | End: 2025-03-11

## 2025-03-11 RX ORDER — SODIUM CHLORIDE 9 MG/ML
INJECTION, SOLUTION INTRAVENOUS CONTINUOUS
Status: DISCONTINUED | OUTPATIENT
Start: 2025-03-11 | End: 2025-03-11 | Stop reason: HOSPADM

## 2025-03-11 RX ORDER — MIDAZOLAM HYDROCHLORIDE 1 MG/ML
INJECTION INTRAMUSCULAR; INTRAVENOUS
Status: DISCONTINUED | OUTPATIENT
Start: 2025-03-11 | End: 2025-03-11

## 2025-03-11 RX ORDER — SODIUM CHLORIDE 0.9 % (FLUSH) 0.9 %
10 SYRINGE (ML) INJECTION
Status: DISCONTINUED | OUTPATIENT
Start: 2025-03-11 | End: 2025-03-11 | Stop reason: HOSPADM

## 2025-03-11 RX ORDER — HALOPERIDOL LACTATE 5 MG/ML
0.5 INJECTION, SOLUTION INTRAMUSCULAR EVERY 10 MIN PRN
Status: DISCONTINUED | OUTPATIENT
Start: 2025-03-11 | End: 2025-03-11 | Stop reason: HOSPADM

## 2025-03-11 RX ADMIN — PHENYLEPHRINE HYDROCHLORIDE 100 MCG: 10 INJECTION INTRAVENOUS at 03:03

## 2025-03-11 RX ADMIN — PROPOFOL 20 MG: 10 INJECTION, EMULSION INTRAVENOUS at 02:03

## 2025-03-11 RX ADMIN — PROTAMINE SULFATE 5 MG: 10 INJECTION, SOLUTION INTRAVENOUS at 03:03

## 2025-03-11 RX ADMIN — PROPOFOL 10 MG: 10 INJECTION, EMULSION INTRAVENOUS at 02:03

## 2025-03-11 RX ADMIN — SODIUM CHLORIDE: 0.9 INJECTION, SOLUTION INTRAVENOUS at 02:03

## 2025-03-11 RX ADMIN — PROTAMINE SULFATE 25 MG: 10 INJECTION, SOLUTION INTRAVENOUS at 03:03

## 2025-03-11 RX ADMIN — PROPOFOL 50 MCG/KG/MIN: 10 INJECTION, EMULSION INTRAVENOUS at 02:03

## 2025-03-11 RX ADMIN — HEPARIN SODIUM 6000 UNITS: 1000 INJECTION, SOLUTION INTRAVENOUS; SUBCUTANEOUS at 02:03

## 2025-03-11 RX ADMIN — FENTANYL CITRATE 25 MCG: 50 INJECTION INTRAMUSCULAR; INTRAVENOUS at 04:03

## 2025-03-11 RX ADMIN — DEXMEDETOMIDINE 4 MCG: 100 INJECTION, SOLUTION, CONCENTRATE INTRAVENOUS at 02:03

## 2025-03-11 RX ADMIN — FENTANYL CITRATE 25 MCG: 50 INJECTION, SOLUTION INTRAMUSCULAR; INTRAVENOUS at 02:03

## 2025-03-11 RX ADMIN — PHENYLEPHRINE HYDROCHLORIDE 100 MCG: 10 INJECTION INTRAVENOUS at 02:03

## 2025-03-11 RX ADMIN — PHENYLEPHRINE HYDROCHLORIDE 50 MCG: 10 INJECTION INTRAVENOUS at 03:03

## 2025-03-11 RX ADMIN — MIDAZOLAM HYDROCHLORIDE 1 MG: 1 INJECTION, SOLUTION INTRAMUSCULAR; INTRAVENOUS at 02:03

## 2025-03-11 RX ADMIN — CEFAZOLIN 2 G: 330 INJECTION, POWDER, FOR SOLUTION INTRAMUSCULAR; INTRAVENOUS at 02:03

## 2025-03-11 NOTE — TRANSFER OF CARE
"Anesthesia Transfer of Care Note    Patient: Mireille Akins    Procedure(s) Performed: Procedure(s) (LRB):  Angiogram Extremity Unilateral (Right)  PTA, SUPERFICIAL FEMORAL ARTERY (Right)    Patient location: PACU    Anesthesia Type: general and MAC    Transport from OR: Transported from OR on 6-10 L/min O2 by face mask with adequate spontaneous ventilation    Post pain: adequate analgesia    Post assessment: no apparent anesthetic complications and tolerated procedure well    Post vital signs: stable    Level of consciousness: awake, alert and oriented    Nausea/Vomiting: no nausea/vomiting    Complications: none    Transfer of care protocol was followed      Last vitals: Visit Vitals  BP (!) 167/79   Pulse 85   Temp 36.8 °C (98.2 °F)   Resp 20   Ht 5' 6" (1.676 m)   Wt 70.8 kg (156 lb)   LMP  (LMP Unknown)   SpO2 97%   Breastfeeding No   BMI 25.18 kg/m²     "

## 2025-03-11 NOTE — DISCHARGE INSTRUCTIONS
Continue taking all of your home medications    Remove dressing in one days  You may shower at that time, no soaking the wound (Bath, pool, hot tub, etc) for two weeks  Reasons to call the office:  - Fever over 101F  - Signs of infection at the wound (redness, pain, warmth, pus)  - Numbness, tingling, or pain in the leg/foot/groin  - Extensive bruising at the groin  - Firmness or bulge in the groin  - Bleeding from the access site in your groin    Once you go home, please abide by the following restrictions:     Rest in bed or a recliner for the remainder of the day following the procedure.      You should not go home alone the day of the procedure.     Lifting and activity restrictions depend on the insertion site for the procedure:     Groin:   - No heavy lifting of more than 5 pounds, running, swimming, or strenuous walking for at least 2 days after the angiogram.   - You may return to your usual activity after the two days.   - Do not take a hot bath or shower for at least 12 hours after discharge.     Because of the sedation you were given for your procedure, we recommend that you have someone stay with you overnight following your procedure.  - Do not drive a car or operate machinery for the remainder of the day.  - Do not consume any alcoholic beverages for the remainder of the day.  - Postpone signing any important papers or making any important decision for the remainder of the day.  - Do not take any muscle relaxants, sedatives, hypnotics, or mood-altering medication today unless ordered by your physician who is aware you are taking the medication today.     If bleeding occurs:  - Apply manual pressure, and immediately seek medical attention at the nearest hospital.   - Seek immediate medical attention if you experience loss of sensation, redness, swelling or discharge from the insertion site.

## 2025-03-11 NOTE — TELEPHONE ENCOUNTER
----- Message from Kelly sent at 3/11/2025  8:11 AM CDT -----  Regarding: Reschedule procedure  Contact: [phone]  Type:  Needs Medical AdviceWho Called: Mireille She has a scheduled procedure today and she has a bad cold what do you want her to do?Would the patient rather a call back or a response via [facility]? Call back Best Call Back Number: [phone] Information:

## 2025-03-11 NOTE — OP NOTE
[facility] (2nd Fl)  Operative Note     Surgery Date: 3/11/2025      Surgeons and Role:     * Teo Murry MD - Primary     * Gasper Li MD - Fellow     Assisting Surgeon: None     Pre-op Diagnosis:  PAD (peripheral artery disease) [I73.9]     Post-op Diagnosis:  Post-Op Diagnosis Codes:     * PAD (peripheral artery disease) [I73.9]     Procedure:   US Guided access to L CFA  RLE angiogram with runoff  PTA of SFA stent (4x80 Ultraverse, 5x80 DCB Admiral Impact)  6F Mynx closure     Anesthesia: Local MAC     Operative Findings: Successful treamentt of >90% instent stenosis, <10% post intervention.      No access site complications.      Estimated Blood Loss: minimal    Operative Procedure:  After an informed consent was obtained the patient was brought to the operating room and placed in the supine position. Both the patient and procedure were confirmed and identified during timeout process. The patient received perioperative antibiotics. The patient's bilateral groins were prepped and draped in usual sterile fashion. Using ultrasound guidance, the left common femoral artery vessel patency was confirmed. Then direct ultrasound guidance the left CFA was entered with a 21-G needle, Mandril wire and 4-Fr micropuncture needle. Then under fluoroscopic guidance, an 0.035-in wire was placed into the distal aorta. The micropuncture dilator was then exchanged over the wire for a 5-Citizen of the [place] sheath. Sheathogram demonstrated access in the CFA. Next an omni catheter was placed over the wire and into the distal aorta under fluoroscopy and aorta traversed in standard fashion and advanced into right external iliac artery which demonstrated:     Right Common femoral, profunda femoral arteries: Patent     Right Superficial femoral artery: Proximal SFA stent restenosis > 90%  Right Popliteal artery: Patent  Tibials: Occluded AT, Patent TP trunk and 2 vessel PT and peroneal runoff     Based on the images from this  diagnostic angio, a decision was made to intervene. We then systemically heparinized the patient with 6000u of heparin.   Next, we placed a up-and-over 6Fr 45cm charly sheath and advanced 035 wire into the popliteal artery.  Following this, a 4 x 80 mm Ultraverse balloon was used to perform a PTA of the SFA stent to the nominal pressure held for a period of 3 minutes. This was followed with a 5x80 Admiral Impact drug coated balloo and again held for 3 minutes. Posttreatment angiogram demonstrated complete resolution of stenosis. Heparin was reversed with protamine. We then deployed a 6-Thai Mynx closure device without issue. At the conclusion of the case the patient was noted to have no evidence of a groin hematoma.     The patient tolerated the procedure well and was taken to the post anesthesia recovery unit in good condition.    All needle, sponge and instrument counts were correct at the end of the case.  Dr. Murry  was present and scrubbed for the entire case.  Patient's family was notified of the results of the surgery immediately afterwards.

## 2025-03-11 NOTE — PROGRESS NOTES
MD Li at bedside   Previous Accession (Optional): [id number] Previous Accession (Optional): [id number]

## 2025-03-11 NOTE — PROGRESS NOTES
Health Maintenance Topic(s) Outreach Outcomes & Actions Taken:    Breast Cancer Screening - Outreach Outcomes & Actions Taken  : External Records Uploaded & Care Team Updated if Applicable    Osteoporosis Screening - Outreach Outcomes & Actions Taken  : External Records Uploaded, Care Team Updated, & History Updated if Applicable

## 2025-03-12 NOTE — ANESTHESIA POSTPROCEDURE EVALUATION
Anesthesia Post Evaluation    Patient: Mireille Akins    Procedure(s) Performed: Procedure(s) (LRB):  Angiogram Extremity Unilateral (Right)  PTA, SUPERFICIAL FEMORAL ARTERY (Right)    Final Anesthesia Type: general      Patient location during evaluation: PACU  Patient participation: Yes- Able to Participate  Level of consciousness: awake and alert  Post-procedure vital signs: reviewed and stable  Pain management: adequate  Airway patency: patent    PONV status at discharge: No PONV  Anesthetic complications: no      Cardiovascular status: blood pressure returned to baseline  Respiratory status: unassisted  Hydration status: euvolemic  Follow-up not needed.              Vitals Value Taken Time   /61 03/11/25 17:30   Temp 36.5 °C (97.7 °F) 03/11/25 15:30   Pulse 81 03/11/25 17:30   Resp 22 03/11/25 17:30   SpO2 100 % 03/11/25 17:30         No case tracking events are documented in the log.      Pain/Destiny Score: Pain Rating Prior to Med Admin: 10 (3/11/2025  5:30 PM)  Pain Rating Post Med Admin: 2 (3/11/2025  5:30 PM)  Destiny Score: 10 (3/11/2025  5:30 PM)

## 2025-03-13 ENCOUNTER — TELEPHONE (OUTPATIENT)
Dept: PAIN MEDICINE | Facility: CLINIC | Age: 80
End: 2025-03-13
Payer: MEDICARE

## 2025-03-13 ENCOUNTER — HOSPITAL ENCOUNTER (OUTPATIENT)
Dept: WOUND CARE | Facility: HOSPITAL | Age: 80
Discharge: HOME | End: 2025-03-13
Attending: SURGERY
Payer: MEDICARE

## 2025-03-13 ENCOUNTER — OFFICE VISIT (OUTPATIENT)
Dept: FAMILY MEDICINE | Facility: CLINIC | Age: 80
End: 2025-03-13
Payer: MEDICARE

## 2025-03-13 VITALS
SYSTOLIC BLOOD PRESSURE: 130 MMHG | TEMPERATURE: 98 F | HEIGHT: 66 IN | RESPIRATION RATE: 18 BRPM | DIASTOLIC BLOOD PRESSURE: 70 MMHG | BODY MASS INDEX: 24.94 KG/M2 | HEART RATE: 68 BPM | WEIGHT: 155.19 LBS

## 2025-03-13 VITALS
HEART RATE: 83 BPM | BODY MASS INDEX: 24.94 KG/M2 | SYSTOLIC BLOOD PRESSURE: 134 MMHG | OXYGEN SATURATION: 97 % | HEIGHT: 66 IN | WEIGHT: 155.19 LBS | DIASTOLIC BLOOD PRESSURE: 70 MMHG

## 2025-03-13 DIAGNOSIS — S81.801D TRAUMATIC OPEN WOUND OF RIGHT LOWER LEG WITH DELAYED HEALING: Primary | ICD-10-CM

## 2025-03-13 DIAGNOSIS — K21.9 GASTROESOPHAGEAL REFLUX DISEASE WITHOUT ESOPHAGITIS: ICD-10-CM

## 2025-03-13 DIAGNOSIS — I73.9 PERIPHERAL ARTERIAL DISEASE WITH HISTORY OF REVASCULARIZATION: ICD-10-CM

## 2025-03-13 DIAGNOSIS — E78.2 MIXED HYPERLIPIDEMIA: Primary | ICD-10-CM

## 2025-03-13 DIAGNOSIS — L97.519 NON-PRESSURE CHRONIC ULCER OF OTHER PART OF RIGHT FOOT WITH UNSPECIFIED SEVERITY: ICD-10-CM

## 2025-03-13 DIAGNOSIS — Z98.890 PERIPHERAL ARTERIAL DISEASE WITH HISTORY OF REVASCULARIZATION: ICD-10-CM

## 2025-03-13 DIAGNOSIS — I73.9 PAD (PERIPHERAL ARTERY DISEASE): ICD-10-CM

## 2025-03-13 DIAGNOSIS — E66.3 OVERWEIGHT WITH BODY MASS INDEX (BMI) OF 25 TO 25.9 IN ADULT: ICD-10-CM

## 2025-03-13 DIAGNOSIS — E03.8 SUBCLINICAL HYPOTHYROIDISM: ICD-10-CM

## 2025-03-13 DIAGNOSIS — N94.9 ADNEXAL CYST: ICD-10-CM

## 2025-03-13 PROCEDURE — 1126F AMNT PAIN NOTED NONE PRSNT: CPT | Mod: CPTII,S$GLB,, | Performed by: FAMILY MEDICINE

## 2025-03-13 PROCEDURE — 3075F SYST BP GE 130 - 139MM HG: CPT | Mod: CPTII,S$GLB,, | Performed by: FAMILY MEDICINE

## 2025-03-13 PROCEDURE — 99999 PR PBB SHADOW E&M-EST. PATIENT-LVL IV: CPT | Mod: PBBFAC,,, | Performed by: FAMILY MEDICINE

## 2025-03-13 PROCEDURE — G2211 COMPLEX E/M VISIT ADD ON: HCPCS | Mod: S$GLB,,, | Performed by: FAMILY MEDICINE

## 2025-03-13 PROCEDURE — 97597 DBRDMT OPN WND 1ST 20 CM/<: CPT

## 2025-03-13 PROCEDURE — 3078F DIAST BP <80 MM HG: CPT | Mod: CPTII,S$GLB,, | Performed by: FAMILY MEDICINE

## 2025-03-13 PROCEDURE — 99214 OFFICE O/P EST MOD 30 MIN: CPT | Mod: S$GLB,,, | Performed by: FAMILY MEDICINE

## 2025-03-13 PROCEDURE — 3288F FALL RISK ASSESSMENT DOCD: CPT | Mod: CPTII,S$GLB,, | Performed by: FAMILY MEDICINE

## 2025-03-13 PROCEDURE — 1101F PT FALLS ASSESS-DOCD LE1/YR: CPT | Mod: CPTII,S$GLB,, | Performed by: FAMILY MEDICINE

## 2025-03-13 RX ORDER — PANTOPRAZOLE SODIUM 20 MG/1
20 TABLET, DELAYED RELEASE ORAL DAILY
Qty: 90 TABLET | Refills: 3 | Status: SHIPPED | OUTPATIENT
Start: 2025-03-13

## 2025-03-13 NOTE — PROGRESS NOTES
Wound Care & Hyperbaric Medicine    Subjective:       Patient ID: Mireille Akins is a 79 y.o. female.    Chief Complaint: Non-healing Wound    Wound care follow up for right leg ulcer. Patient s/p right leg revascularization with  Monday on 3/11/25. Wound noted with dried fibrin, area debrided and small ulceration noted. Patient to continue Bactroban and follow up as needed.     ROS: no fever , no complaints s/p stent placement at [facility], wound better.        Objective:      Physical Exam  Constitutional:       Appearance: She is well-developed.   HENT:      Head: Normocephalic.   Eyes:      Conjunctiva/sclera: Conjunctivae normal.      Pupils: Pupils are equal, round, and reactive to light.   Cardiovascular:      Rate and Rhythm: Normal rate and regular rhythm.      Heart sounds: Normal heart sounds.   Pulmonary:      Effort: Pulmonary effort is normal.      Breath sounds: Normal breath sounds.   Abdominal:      General: Bowel sounds are normal.      Palpations: Abdomen is soft.   Musculoskeletal:         General: Normal range of motion.      Cervical back: Normal range of motion and neck supple.   Skin:     General: Skin is warm and dry.   Neurological:      Mental Status: She is alert and oriented to person, place, and time.      Deep Tendon Reflexes: Reflexes are normal and symmetric.        Wound 03/05/25 1355 Traumatic Right lower Leg (Active)   03/05/25 1355 Leg   Present on Original Admission: Y   Primary Wound Type: Traumatic   Side: Right   Orientation: lower   Wound Approximate Age at First Assessment (Weeks):    Wound Number:    Is this injury device related?:    Incision Type:    Closure Method:    Wound Description (Comments):    Type:    Additional Comments:    Ankle-Brachial Index:    Pulses:    Removal Indication and Assessment:    Wound Outcome:    Wound Image    03/13/25 1118   Dressing Appearance Dried drainage 03/13/25 1118   Drainage Amount None 03/13/25  1118   Appearance Dry;Fibrin 03/13/25 1118   Tissue loss description Full thickness 03/13/25 1118   Wound Edges Undefined 03/13/25 1118   Wound Length (cm) 0.2 cm 03/13/25 1118   Wound Width (cm) 0.4 cm 03/13/25 1118   Wound Depth (cm) 0.1 cm 03/13/25 1118   Wound Volume (cm^3) 0.004 cm^3 03/13/25 1118   Wound Surface Area (cm^2) 0.06 cm^2 03/13/25 1118   Care Cleansed with:;Sterile normal saline;Debrided 03/13/25 1118   Dressing Applied;Other (comment);Island/border 03/13/25 1118   Dressing Change Due 03/14/25 03/13/25 1118         Assessment/Plan:         ICD-10-CM ICD-9-CM   1. Traumatic open wound of right lower leg with delayed healing  S81.801D 891.1         Tissue pathology and/or culture taken:   [] Yes    [x] No   Sharp debridement performed:    [x] Yes    [] No   Labs ordered this visit:    [] Yes    [x] No   Imaging ordered this visit:    [] Yes    [x] No     Is the wound improving?    [x] Yes    [] No   Any signs of infection?    [] Yes    [x] No             Orders Placed This Encounter   Procedures    Change dressing     Right Leg   Cleanse wound with:normal saline   Lidocaine:prn   Primary dressing:mupirocin ointment   Secondary dressing:large band-aid   Frequency:every other day and PRN until closed  Follow-up:  PRN        Follow up if symptoms worsen or fail to improve.            This includes face to face time and non-face to face time preparing to see the patient (eg, review of tests), obtaining and/or reviewing separately obtained history, documenting clinical information in the electronic or other health record, independently interpreting results and communicating results to the patient/family/caregiver, or care coordinator.  Total time spent  > 20 minutes

## 2025-03-13 NOTE — TELEPHONE ENCOUNTER
----- Message from Luz sent at 3/13/2025  2:19 PM CDT -----  Regarding: pt advice  Contact: [phone]  Patient calling to advise she will not be having 2nd Injection. Pls call  [phone]

## 2025-03-13 NOTE — PROGRESS NOTES
Subjective:         Patient ID: Mireille Akins is a 79 y.o. female.    Chief Complaint: Follow-up and chronic med follow up    Patient Active Problem List   Diagnosis    Renal mass, right    Hormone replacement therapy (HRT)    Angiomyolipoma    Overweight with body mass index (BMI) of 25 to 25.9 in adult    Stress due to illness of family member-  - newly dx as diabetic-     Peripheral arterial disease with history of revascularization    Mixed hyperlipidemia    Myalgia due to statin    PAD (peripheral artery disease)    Traumatic open wound of right lower leg with delayed healing      HPI    Mireille is a 79 y.o. female    History of Present Illness    CHIEF COMPLAINT:  Mireille presents today for follow up of chronic medical conditions    PERIPHERAL ARTERY DISEASE:  She recently underwent balloon angioplasty of existing stent for peripheral artery disease. The condition was initially discovered following foot surgery when she experienced persistent cold foot due to poor circulation. She reports feeling well after the recent procedure with no symptoms.    BACK PAIN:  She reports back pain that started about a month ago due to a pinched nerve and three damaged discs. The pain radiated across her groin, down the outside of her leg, and into her ankle, severely limiting her ability to walk for about three weeks. She received an injection from Dr. Marvin, after which symptoms slowly began to subside.    DIET AND EXERCISE:  She has successfully lost 15-20 lbs as requested by her cardiologist through dietary modifications including reduced carbohydrate intake and focus on protein, vegetables, salads, and fruits. She exercises by walking on treadmill 3 times weekly for 30 minutes.    SURGICAL HISTORY:  She has history of partial hysterectomy with uterus removal and retained ovaries.       Objective:     Vitals:    03/13/25 0911   BP: 134/70   BP Location: Left arm   Patient Position: Sitting   Pulse:  "83   SpO2: 97%   Weight: 70.4 kg (155 lb 3.3 oz)   Height: 5' 6" (1.676 m)         Physical Exam  Vitals and nursing note reviewed.   Constitutional:       General: She is not in acute distress.     Appearance: Normal appearance. She is not ill-appearing, toxic-appearing or diaphoretic.   HENT:      Head: Normocephalic and atraumatic.   Eyes:      General: No scleral icterus.     Conjunctiva/sclera: Conjunctivae normal.   Cardiovascular:      Rate and Rhythm: Normal rate.      Heart sounds: Normal heart sounds. No murmur heard.  Pulmonary:      Effort: Pulmonary effort is normal. No respiratory distress.   Skin:     Coloration: Skin is not pale.   Neurological:      Mental Status: She is alert. Mental status is at baseline.   Psychiatric:         Attention and Perception: Attention and perception normal.         Mood and Affect: Mood and affect normal.         Speech: Speech normal.         Behavior: Behavior normal.         Cognition and Memory: Cognition and memory normal.         Judgment: Judgment normal.       Assessment:       1. Mixed hyperlipidemia    2. PAD (peripheral artery disease)    3. Peripheral arterial disease with history of revascularization    4. Subclinical hypothyroidism    5. Overweight with body mass index (BMI) of 25 to 25.9 in adult    6. Gastroesophageal reflux disease without esophagitis    7. Adnexal cyst          Plan:   Recent relevant labs results reviewed with patient.         Assessment & Plan    Monitored blood pressure, which was borderline but likely affected by recent procedure, head cold, and waiting time  Reviewed recent peripheral artery disease intervention with balloon and stent placement  Assessed weight loss progress, noting patient achieved cardiologist's goal of 15-20 pound loss  Evaluated lab results: liver and kidney function normal, albumin slightly low but age-appropriate, sodium minimally decreased, cholesterol profile favorable with elevated HDL  Reviewed thyroid " function and blood sugar, both within normal limits  Noted patient is not anemic  Discussed back issues and recent MRI findings of left adnexal cyst  Considered patient's partial hysterectomy history in relation to adnexal cyst  Assessed bone density results, which were overall good  Reviewed mammogram results, which were normal  Monitored subclinical hypothyroidism, noting thyroid levels have returned to normal range    NON-PRESSURE CHRONIC ULCER OF OTHER PART OF RIGHT FOOT WITH UNSPECIFIED SEVERITY:  - Monitored the status of the right foot ulcer, which is almost healed.  - Visually examined the right foot ulcer and confirmed its improved condition.  - Advised the patient to continue the walking program to improve circulation.  - Scheduled a follow-up visit with Dr. Solis for the second visit regarding the foot ulcer.  - Instructed the patient to continue treatment with Dr. Solis, for the foot ulcer.    PERIPHERAL VASCULAR DISEASE AND ANGIOPLASTY STATUS:  - Mireille underwent a procedure on Tuesday for peripheral artery disease, where a balloon was placed in an existing stent due to a blockage seen on imaging.  - Post-procedure evaluation shows the patient feeling fine with no reported symptoms related to peripheral artery disease.  - Instructed the patient to resume taking aspirin after the procedure.  - Emphasized the importance of continuing the walking program to stimulate circulation, recommending walking 3 times per week on a treadmill for at least 30 minutes, even after vascularization procedure.    HYPERLIPIDEMIA MANAGEMENT:  - Continued current cholesterol medication as prescribed by Dr. Mayorga.  - Monitored the patient's cholesterol levels, noting an LDL level of 91.  - Evaluated cholesterol levels as excellent, with high HDL (good cholesterol), normal total cholesterol, and low LDL (bad cholesterol).  - Will inform Dr. Mayorga about the cholesterol results.    GENERAL HEALTH RECOMMENDATIONS:  -  Mireille to maintain protein-focused diet to support albumin levels and continue vitamin D and calcium supplementation for bone health.  - Provided information on the importance of COVID-19 vaccination before international travel due to different strains circulating abroad.  - Recommend RSV vaccination at pharmacy.  - Discussed age-related changes in the body and the importance of managing expectations for mobility and pain.    FOLLOW-UP:  - Follow up in 6 months.         1. Mixed hyperlipidemia    2. PAD (peripheral artery disease)    3. Peripheral arterial disease with history of revascularization    4. Subclinical hypothyroidism    5. Overweight with body mass index (BMI) of 25 to 25.9 in adult    6. Gastroesophageal reflux disease without esophagitis  -     pantoprazole (PROTONIX) 20 MG tablet; Take 1 tablet (20 mg total) by mouth once daily.  Dispense: 90 tablet; Refill: 3    7. Adnexal cyst  -     US Pelvis Complete Non OB; Future; Expected date: 03/13/2025      Patient's questions answered. Plan reviewed with patient at the end of visit. Relevant precautions to chief complaint and reasons to seek further medical care or to contact the office sooner reviewed with patient.     Follow up in about 6 months (around 9/13/2025) for f/u chronic medical problems.        Part of this note was dictated using voice recognition software. Please excuse any typographical errors.     This note was generated with the assistance of ambient listening technology. Verbal consent was obtained by the patient and accompanying visitor(s) for the recording of patient appointment to facilitate this note. I attest to having reviewed and edited the generated note for accuracy, though some syntax or spelling errors may persist. Please contact the author of this note for any clarification.

## 2025-03-17 ENCOUNTER — HOSPITAL ENCOUNTER (OUTPATIENT)
Dept: RADIOLOGY | Facility: HOSPITAL | Age: 80
Discharge: HOME | End: 2025-03-17
Attending: FAMILY MEDICINE
Payer: MEDICARE

## 2025-03-17 DIAGNOSIS — N94.9 ADNEXAL CYST: ICD-10-CM

## 2025-03-17 PROCEDURE — 76856 US EXAM PELVIC COMPLETE: CPT | Mod: 26,,, | Performed by: RADIOLOGY

## 2025-03-17 PROCEDURE — 76830 TRANSVAGINAL US NON-OB: CPT | Mod: 26,,, | Performed by: RADIOLOGY

## 2025-03-17 PROCEDURE — 76856 US EXAM PELVIC COMPLETE: CPT | Mod: TC

## 2025-03-18 ENCOUNTER — PATIENT MESSAGE (OUTPATIENT)
Dept: VASCULAR SURGERY | Facility: CLINIC | Age: 80
End: 2025-03-18
Payer: MEDICARE

## 2025-03-18 ENCOUNTER — TELEPHONE (OUTPATIENT)
Dept: VASCULAR SURGERY | Facility: CLINIC | Age: 80
End: 2025-03-18
Payer: MEDICARE

## 2025-03-18 DIAGNOSIS — I73.9 PAD (PERIPHERAL ARTERY DISEASE): Primary | ICD-10-CM

## 2025-03-18 NOTE — TELEPHONE ENCOUNTER
Contacted pt in response to message. Appts rescheduled, pt confirmed.   ----- Message from Charli sent at 3/18/2025  2:14 PM CDT -----  Contact: [phone]  SOPHIE MEDRANO calling regarding Appointment Access  (message) Pt asking for a call back to discuss her appt that is schedule for 03/27/25 Pt states she is confuse about these two visit

## 2025-03-19 ENCOUNTER — OFFICE VISIT (OUTPATIENT)
Dept: FAMILY MEDICINE | Facility: CLINIC | Age: 80
End: 2025-03-19
Payer: MEDICARE

## 2025-03-19 ENCOUNTER — TELEPHONE (OUTPATIENT)
Dept: VASCULAR SURGERY | Facility: CLINIC | Age: 80
End: 2025-03-19
Payer: MEDICARE

## 2025-03-19 VITALS
BODY MASS INDEX: 24.94 KG/M2 | DIASTOLIC BLOOD PRESSURE: 66 MMHG | OXYGEN SATURATION: 98 % | HEIGHT: 66 IN | WEIGHT: 155.19 LBS | SYSTOLIC BLOOD PRESSURE: 130 MMHG | HEART RATE: 75 BPM

## 2025-03-19 DIAGNOSIS — B96.89 BACTERIAL URI: Primary | ICD-10-CM

## 2025-03-19 DIAGNOSIS — J06.9 BACTERIAL URI: Primary | ICD-10-CM

## 2025-03-19 PROCEDURE — 3288F FALL RISK ASSESSMENT DOCD: CPT | Mod: CPTII,S$GLB,,

## 2025-03-19 PROCEDURE — 99214 OFFICE O/P EST MOD 30 MIN: CPT | Mod: S$GLB,,,

## 2025-03-19 PROCEDURE — 1160F RVW MEDS BY RX/DR IN RCRD: CPT | Mod: CPTII,S$GLB,,

## 2025-03-19 PROCEDURE — 99999 PR PBB SHADOW E&M-EST. PATIENT-LVL III: CPT | Mod: PBBFAC,,,

## 2025-03-19 PROCEDURE — 3075F SYST BP GE 130 - 139MM HG: CPT | Mod: CPTII,S$GLB,,

## 2025-03-19 PROCEDURE — 3078F DIAST BP <80 MM HG: CPT | Mod: CPTII,S$GLB,,

## 2025-03-19 PROCEDURE — 1101F PT FALLS ASSESS-DOCD LE1/YR: CPT | Mod: CPTII,S$GLB,,

## 2025-03-19 PROCEDURE — 1159F MED LIST DOCD IN RCRD: CPT | Mod: CPTII,S$GLB,,

## 2025-03-19 PROCEDURE — 1126F AMNT PAIN NOTED NONE PRSNT: CPT | Mod: CPTII,S$GLB,,

## 2025-03-19 RX ORDER — AMOXICILLIN AND CLAVULANATE POTASSIUM 875; 125 MG/1; MG/1
1 TABLET, FILM COATED ORAL 2 TIMES DAILY
Qty: 14 TABLET | Refills: 0 | Status: SHIPPED | OUTPATIENT
Start: 2025-03-19 | End: 2025-03-26

## 2025-03-19 NOTE — PROGRESS NOTES
[facility]    3/19/2025      Subjective:       Patient ID:  Mireille is a 79 y.o. female .  has a past medical history of Angiomyolipoma, Asthma, PONV (postoperative nausea and vomiting), and Subclinical hypothyroidism.    History of Present Illness    CHIEF COMPLAINT:  Ms. Akins presents today for left ear congestion continuing cold symptoms    ENT SYMPTOMS:  She reports decreased hearing and crackling sensation deep inside the left ear. She has had cold symptoms for two weeks with yellow nasal discharge from the left side for the past 3 days and clear mucus from the right side. She reports improvement in nasal congestion after taking Mucinex for over a week but congestion still present as well as mild mucus from nose.    MEDICAL HISTORY:  She has a history of allergies since her twenties.      ROS:  General: -fever, -chills, -fatigue, -weight gain, -weight loss  Eyes: -vision changes, -redness, -discharge  ENT: -ear pain, -nasal congestion, -sore throat, +difficulty hearing, +hearing loss, +nasal discharge  Cardiovascular: -chest pain, -palpitations, -lower extremity edema  Respiratory: -cough, -shortness of breath  Gastrointestinal: -abdominal pain, -nausea, -vomiting, -diarrhea, -constipation, -blood in stool  Genitourinary: -dysuria, -hematuria, -frequency  Musculoskeletal: -joint pain, -muscle pain  Skin: -rash, -lesion  Neurological: -headache, -dizziness, -numbness, -tingling  Psychiatric: -anxiety, -depression, -sleep difficulty           Problem List[1]      Last HgbA1C:    Lab Results   Component Value Date    HGBA1C 5.1 03/10/2025         Last Lipid Panel:    Lab Results   Component Value Date    HDL 76 (H) 03/10/2025    HDL 82 (H) 04/22/2024    HDL 76 (H) 09/26/2023       Lab Results   Component Value Date    LDLCALC 102.4 03/10/2025    LDLCALC 90.6 04/22/2024    LDLCALC 113.0 09/26/2023       Lab Results   Component Value Date    TRIG 93 03/10/2025    TRIG 82  "04/22/2024    TRIG 110 09/26/2023       Lab Results   Component Value Date    CHOLHDL 38.6 03/10/2025    CHOLHDL 43.4 04/22/2024    CHOLHDL 36.0 09/26/2023         Review of patient's allergies indicates:  No Known Allergies     Medication List with Changes/Refills   New Medications    AMOXICILLIN-CLAVULANATE 875-125MG (AUGMENTIN) 875-125 MG PER TABLET    Take 1 tablet by mouth 2 (two) times daily. for 7 days   Current Medications    ASPIRIN (ECOTRIN) 81 MG EC TABLET    Take 1 tablet (81 mg total) by mouth once daily.    BEMPEDOIC ACID-EZETIMIBE 180-10 MG TAB    Take 1 tablet daily.    CELECOXIB (CELEBREX) 100 MG CAPSULE    Take 1 capsule (100 mg total) by mouth once daily.    CLOPIDOGREL (PLAVIX) 75 MG TABLET    Take 1 tablet (75 mg total) by mouth once daily.    ESTRADIOL (ESTRACE) 2 MG TABLET    Take 1 tablet (2 mg total) by mouth once daily.    GABAPENTIN (NEURONTIN) 300 MG CAPSULE    Take 1 capsule (300 mg total) by mouth 2 (two) times daily AND 2 capsules (600 mg total) every evening.    MULTIVITAMIN W-MINERALS/LUTEIN (CENTRUM SILVER ORAL)    Take 1 tablet by mouth once daily.    PANTOPRAZOLE (PROTONIX) 20 MG TABLET    Take 1 tablet (20 mg total) by mouth once daily.               Objective:      /66 (BP Location: Left arm, Patient Position: Sitting)   Pulse 75   Ht 5' 6" (1.676 m)   Wt 70.4 kg (155 lb 3.3 oz)   LMP  (LMP Unknown)   SpO2 98%   BMI 25.05 kg/m²   Estimated body mass index is 25.05 kg/m² as calculated from the following:    Height as of this encounter: 5' 6" (1.676 m).    Weight as of this encounter: 70.4 kg (155 lb 3.3 oz).    Physical Exam  Vitals reviewed.   Constitutional:       Appearance: Normal appearance.   HENT:      Head: Normocephalic and atraumatic.      Right Ear: Tympanic membrane normal.      Ears:      Comments: Mild fluid fluid located behind left ear     Mouth/Throat:      Mouth: Mucous membranes are moist.      Pharynx: Oropharynx is clear.   Eyes:      " Conjunctiva/sclera: Conjunctivae normal.   Cardiovascular:      Rate and Rhythm: Normal rate and regular rhythm.      Heart sounds: Normal heart sounds.   Pulmonary:      Effort: Pulmonary effort is normal. No respiratory distress.      Breath sounds: Normal breath sounds.   Musculoskeletal:         General: Normal range of motion.      Cervical back: Normal range of motion.   Neurological:      Mental Status: She is alert and oriented to person, place, and time.   Psychiatric:         Mood and Affect: Mood normal.             Assessment and Plan:   1. Bacterial URI  - amoxicillin-clavulanate 875-125mg (AUGMENTIN) 875-125 mg per tablet; Take 1 tablet by mouth 2 (two) times daily. for 7 days  Dispense: 14 tablet; Refill: 0     Assessment & Plan    - Assessed left ear complaint, noting fluid buildup likely related to recent cold.  - Determined antibiotic treatment appropriate to address both potential ear infection and lingering upper respiratory symptoms   Noted the patient's report of cold-like symptoms persisting for approximately 2 weeks, with yellow mucus discharge from the left side.   Prescribed a 7-day course of antibiotics to address  upper respiratory complaints   Advised the patient to monitor for improvement in respiratory symptoms.  Patient to continue symptomatic treatment as tolerated.  Patient to follow up if symptoms worsen    FOLLOW-UP:   Ms. Akins to continue to stay hydrated.   Follow up in April for labs ordered previously.         The patient was informed of the following statements     Emergency Care:Seek immediate medical attention in the emergency room if you experience any new or worsening symptoms, or if your current condition significantly changes or becomes more severe.  Patient Acknowledgment: Patient verbalizes understanding of the plan and agrees to proceed with the recommended care.    Follow Up:  9/23/2025 Haleigh Seo MD                  No follow-ups on file.    Other Orders  Placed This Visit:  No orders of the defined types were placed in this encounter.        This note was generated with the assistance of ambient listening technology. Verbal consent was obtained by the patient and accompanying visitor(s) for the recording of patient appointment to facilitate this note. I attest to having reviewed and edited the generated note for accuracy, though some syntax or spelling errors may persist. Please contact the author of this note for any clarification.    I spent a total of 30 minutes on the day of the visit.This includes face to face time and non-face to face time preparing to see the patient (eg, review of tests), obtaining and/or reviewing separately obtained history, documenting clinical information in the electronic or other health record, independently interpreting results and communicating results to the patient/family/caregiver, or care coordinator.      Oscar Yadav PA-C             [1]   Patient Active Problem List  Diagnosis    Renal mass, right    Hormone replacement therapy (HRT)    Angiomyolipoma    Overweight with body mass index (BMI) of 25 to 25.9 in adult    Stress due to illness of family member-  - newly dx as diabetic-     Peripheral arterial disease with history of revascularization    Mixed hyperlipidemia    Myalgia due to statin    PAD (peripheral artery disease)    Traumatic open wound of right lower leg with delayed healing

## 2025-03-19 NOTE — TELEPHONE ENCOUNTER
Left message for the pt to call the clinic, u/s is scheduled for tomorrow at 3pm with a f/u on 4/28 with Dr Murry

## 2025-03-20 ENCOUNTER — HOSPITAL ENCOUNTER (OUTPATIENT)
Dept: VASCULAR SURGERY | Facility: CLINIC | Age: 80
Discharge: HOME | End: 2025-03-20
Attending: SURGERY
Payer: MEDICARE

## 2025-03-20 DIAGNOSIS — I73.9 PAD (PERIPHERAL ARTERY DISEASE): ICD-10-CM

## 2025-03-24 ENCOUNTER — PATIENT MESSAGE (OUTPATIENT)
Dept: PAIN MEDICINE | Facility: CLINIC | Age: 80
End: 2025-03-24
Payer: MEDICARE

## 2025-03-24 DIAGNOSIS — Z78.0 POSTMENOPAUSAL: ICD-10-CM

## 2025-03-24 DIAGNOSIS — Z00.00 ENCOUNTER FOR MEDICARE ANNUAL WELLNESS EXAM: ICD-10-CM

## 2025-03-24 RX ORDER — METHYLPREDNISOLONE 4 MG/1
TABLET ORAL
Qty: 21 EACH | Refills: 0 | Status: SHIPPED | OUTPATIENT
Start: 2025-03-24 | End: 2025-03-24

## 2025-03-24 RX ORDER — METHYLPREDNISOLONE 4 MG/1
TABLET ORAL
Qty: 21 EACH | Refills: 0 | Status: SHIPPED | OUTPATIENT
Start: 2025-03-24

## 2025-03-24 RX ORDER — ESTRADIOL 2 MG/1
2 TABLET ORAL
Qty: 90 TABLET | Refills: 0 | Status: SHIPPED | OUTPATIENT
Start: 2025-03-24

## 2025-03-28 NOTE — BRIEF OP NOTE
Fax received from [facility] regarding Pt  Pt location at SNF: [facility]  Fax sent by: RN    Fax regarding concern: Medication    Assessment:         Any recommendations or new orders?     [facility] - Surgery (2nd Fl)  Brief Operative Note    Surgery Date: 3/11/2025     Surgeons and Role:     * Teo Murry MD - Primary     * Gasper Li MD - Fellow    Assisting Surgeon: None    Pre-op Diagnosis:  PAD (peripheral artery disease) [I73.9]    Post-op Diagnosis:  Post-Op Diagnosis Codes:     * PAD (peripheral artery disease) [I73.9]    Procedure:   US Guided access to L CFA  RLE angiogram with runoff  PTA of SFA stent (4x80 POBA, 5x80 DCB)    Anesthesia: Local MAC    Operative Findings: Successful treamentt of >90% instent stenosis, <10% post intervention.     No access site complications.     Estimated Blood Loss: minimal             Specimens:   Specimen (24h ago, onward)      None              Discharge Note    OUTCOME: Patient tolerated treatment/procedure well without complication and is now ready for discharge.    DISPOSITION: Home or Self Care    FINAL DIAGNOSIS:  <principal problem not specified>    FOLLOWUP: In clinic    DISCHARGE INSTRUCTIONS:    Discharge Procedure Orders   Remove dressing in 24 hours     Notify your health care provider if you experience any of the following:  redness, tenderness, or signs of infection (pain, swelling, redness, odor or green/yellow discharge around incision site)

## 2025-04-11 ENCOUNTER — RESULTS FOLLOW-UP (OUTPATIENT)
Dept: FAMILY MEDICINE | Facility: CLINIC | Age: 80
End: 2025-04-11

## 2025-04-11 DIAGNOSIS — N94.9 ADNEXAL CYST: Primary | ICD-10-CM

## 2025-04-13 ENCOUNTER — PATIENT MESSAGE (OUTPATIENT)
Dept: FAMILY MEDICINE | Facility: CLINIC | Age: 80
End: 2025-04-13
Payer: MEDICARE

## 2025-04-13 ENCOUNTER — E-CONSULT (OUTPATIENT)
Dept: GYNECOLOGIC ONCOLOGY | Facility: CLINIC | Age: 80
End: 2025-04-13
Payer: MEDICARE

## 2025-04-13 DIAGNOSIS — R19.00 PELVIC MASS: Primary | ICD-10-CM

## 2025-04-13 NOTE — CONSULTS
[facility]  Response for E-Consult     Patient Name: Mireille Akins  MRN: [id number]  Primary Care Provider: Haleigh Seo MD   Requesting Provider: Haleigh Seo MD  Consults    Recommendation: Review of medical chart reports history of hysterectomy. Would clarify if ovaries remain in situ. Review of CT urogram from 2014 shows small left adnexal cysts. Recent pelvic US from 3/2025 demonstrates same left adnexal cysts, however it does appear larger than in 2014. Features of the cyst are not complex. It is simple without nodularity or vascularity. Favor this to be a benign lesion. Ob/Gyn or Gyn/Onc follow up is appropriate. Would obtain . Can consider surgical resection given interval growth.       Total time of Consultation: 15 minute    I did not speak to the requesting provider verbally about this.     *This eConsult is based on the clinical data available to me and is furnished without benefit of a physical examination. The eConsult will need to be interpreted in light of any clinical issues or changes in patient status not available to me at the time of filing this eConsults. Significant changes in patient condition or level of acuity should result in immediate formal consultation and reevaluation. Please alert me if you have further questions.    Thank you for this eConsult referral.     Leni Peterson MD  [facility]

## 2025-04-28 ENCOUNTER — OFFICE VISIT (OUTPATIENT)
Dept: VASCULAR SURGERY | Facility: CLINIC | Age: 80
End: 2025-04-28
Attending: SURGERY
Payer: MEDICARE

## 2025-04-28 VITALS
SYSTOLIC BLOOD PRESSURE: 153 MMHG | TEMPERATURE: 98 F | HEART RATE: 78 BPM | HEIGHT: 66 IN | WEIGHT: 158.75 LBS | BODY MASS INDEX: 25.51 KG/M2 | DIASTOLIC BLOOD PRESSURE: 77 MMHG

## 2025-04-28 DIAGNOSIS — I73.9 PAD (PERIPHERAL ARTERY DISEASE): Primary | ICD-10-CM

## 2025-04-28 PROBLEM — N94.9 ADNEXAL CYST: Status: ACTIVE | Noted: 2025-04-28

## 2025-04-28 PROBLEM — L97.519 NON-PRESSURE CHRONIC ULCER OF OTHER PART OF RIGHT FOOT WITH UNSPECIFIED SEVERITY: Status: ACTIVE | Noted: 2025-04-28

## 2025-04-28 PROBLEM — K21.9 GASTROESOPHAGEAL REFLUX DISEASE WITHOUT ESOPHAGITIS: Status: ACTIVE | Noted: 2025-04-28

## 2025-04-28 PROCEDURE — 99213 OFFICE O/P EST LOW 20 MIN: CPT | Mod: S$GLB,,, | Performed by: SURGERY

## 2025-04-28 PROCEDURE — 1126F AMNT PAIN NOTED NONE PRSNT: CPT | Mod: CPTII,S$GLB,, | Performed by: SURGERY

## 2025-04-28 PROCEDURE — 99999 PR PBB SHADOW E&M-EST. PATIENT-LVL III: CPT | Mod: PBBFAC,,, | Performed by: SURGERY

## 2025-04-28 PROCEDURE — 3078F DIAST BP <80 MM HG: CPT | Mod: CPTII,S$GLB,, | Performed by: SURGERY

## 2025-04-28 PROCEDURE — 1101F PT FALLS ASSESS-DOCD LE1/YR: CPT | Mod: CPTII,S$GLB,, | Performed by: SURGERY

## 2025-04-28 PROCEDURE — 1159F MED LIST DOCD IN RCRD: CPT | Mod: CPTII,S$GLB,, | Performed by: SURGERY

## 2025-04-28 PROCEDURE — 3288F FALL RISK ASSESSMENT DOCD: CPT | Mod: CPTII,S$GLB,, | Performed by: SURGERY

## 2025-04-28 PROCEDURE — 3077F SYST BP >= 140 MM HG: CPT | Mod: CPTII,S$GLB,, | Performed by: SURGERY

## 2025-04-28 NOTE — PROGRESS NOTES
Returns.  She had a stent placed in her right leg.  This was because she had to have hardware removed by Dr. Holden.  Overall she is doing well now.  She did have some problems with her back.  She had an epidural placed which has given her significant relief.  She is doing well from that.  She had ABIs today which show the right to be 0.85 and mild disease.  She will have her walk we will have her keep going we will keep a close eye on her and bring her back in 3 months with ABIs and PVRs

## 2025-06-24 ENCOUNTER — TELEPHONE (OUTPATIENT)
Dept: PAIN MEDICINE | Facility: CLINIC | Age: 80
End: 2025-06-24

## 2025-06-24 ENCOUNTER — OFFICE VISIT (OUTPATIENT)
Dept: PAIN MEDICINE | Facility: CLINIC | Age: 80
End: 2025-06-24
Payer: MEDICARE

## 2025-06-24 VITALS
BODY MASS INDEX: 25.44 KG/M2 | HEIGHT: 66 IN | HEART RATE: 77 BPM | DIASTOLIC BLOOD PRESSURE: 78 MMHG | WEIGHT: 158.31 LBS | SYSTOLIC BLOOD PRESSURE: 132 MMHG

## 2025-06-24 DIAGNOSIS — Z78.0 POSTMENOPAUSAL: ICD-10-CM

## 2025-06-24 DIAGNOSIS — M54.16 LUMBAR RADICULOPATHY: Primary | ICD-10-CM

## 2025-06-24 DIAGNOSIS — M51.362 DEGENERATION OF INTERVERTEBRAL DISC OF LUMBAR REGION WITH DISCOGENIC BACK PAIN AND LOWER EXTREMITY PAIN: ICD-10-CM

## 2025-06-24 PROCEDURE — 99999 PR PBB SHADOW E&M-EST. PATIENT-LVL III: CPT | Mod: PBBFAC,,, | Performed by: EMERGENCY MEDICINE

## 2025-06-24 PROCEDURE — 1101F PT FALLS ASSESS-DOCD LE1/YR: CPT | Mod: CPTII,S$GLB,, | Performed by: EMERGENCY MEDICINE

## 2025-06-24 PROCEDURE — 3075F SYST BP GE 130 - 139MM HG: CPT | Mod: CPTII,S$GLB,, | Performed by: EMERGENCY MEDICINE

## 2025-06-24 PROCEDURE — 99214 OFFICE O/P EST MOD 30 MIN: CPT | Mod: S$GLB,,, | Performed by: EMERGENCY MEDICINE

## 2025-06-24 PROCEDURE — 1125F AMNT PAIN NOTED PAIN PRSNT: CPT | Mod: CPTII,S$GLB,, | Performed by: EMERGENCY MEDICINE

## 2025-06-24 PROCEDURE — 3078F DIAST BP <80 MM HG: CPT | Mod: CPTII,S$GLB,, | Performed by: EMERGENCY MEDICINE

## 2025-06-24 PROCEDURE — 3288F FALL RISK ASSESSMENT DOCD: CPT | Mod: CPTII,S$GLB,, | Performed by: EMERGENCY MEDICINE

## 2025-06-24 PROCEDURE — 1159F MED LIST DOCD IN RCRD: CPT | Mod: CPTII,S$GLB,, | Performed by: EMERGENCY MEDICINE

## 2025-06-24 NOTE — PROGRESS NOTES
Interventional Pain Management - Established     Interval history 06/24/2025: Patient presents to clinic and she reports that she is having stabbing pain in the same area as previous. The pain then increased going into the right hip and groin. She had stopped the gabapentin when she got back from [place], but then restarted recently. It helps, but the pain is starting to become intrusive especially when she returned over.     Interval History 02/28/2025:  On 02/10/25 the patient underwent L3/4, L4/5 Right TFESI and reports that the pain has improved. The pain in her groin has improved. The pain on the right side of her knee - localized to proximal lateral tibial ridge. She saw her vascular surgeon who said that her stent needs to be revascularized on the right leg, but doesn't think that her pain is due to the PAD. Patient is taking sabra TID. Currently pain level is 01/10 back and groin, and 5/10 to her lateral knee.     Original HPI 02/03/2025: Mireille Estrellakaitlynbraeden Akins  presents to the clinic for the evaluation of the above pain. The pain started 1 week ago     Original Pain Description:  The pain is located in the lower back and is radiating to the right leg around to anterior and to top of foot. The pain is described as aching, burning, sharp, shooting, stabbing, and throbbing. Exacerbating factors: Laying, Night Time, Morning, Flexing, Lifting, and Getting out of bed/chair. Mitigating factors heat and medications. Symptoms interfere with daily activity. The patient feels like symptoms have been worsening. Patient denies night fever/night sweats, urinary incontinence, bowel incontinence, significant weight loss, significant motor weakness, and loss of sensations. Dr. Love has been treating her right hip bursitis including recent injection. She was placed in traction at her last PT appointment 11 days ago and the pain started      PAIN SCORES:  Best: Pain is 2  Current: Pain is  0  Worst: Pain is 10    6 weeks of Conservative therapy:  PT: Participating  Chiro:  HEP: Participating    Treatments / Medications:   Flexeril 10mg   Celebrex 100mg - Dr. Murry  Jaylon 300mg TID     Antiplatelets/Anticoagulants:  Aspirin 81 mg  Plavix 75 mg    Interventional Pain Procedures:   Laminectomy in 90s    IMAGING:        Medications/Allergies: See med card    ROS:  GENERAL: No fever. No chills. No fatigue. Denies weight loss. Denies weight gain.  Back / musculoskeletal / neuro : See HPI    VITALS:   There were no vitals filed for this visit.        There is no height or weight on file to calculate BMI.      2/28/2025     9:51 AM 2/10/2025     2:06 PM 2/3/2025     2:14 PM   Last 3 PDI Scores   Pain Disability Index (PDI) 35 14 35       PHYSICAL EXAM:   GENERAL: Well appearing, in no acute distress, alert and oriented x3.  PSYCH:  Mood and affect appropriate.  SKIN: Skin color, texture, turgor normal, no rashes or lesions.  HEENT:  Normocephalic, atraumatic. Cranial nerves grossly intact.  NECK: No pain to palpation over the cervical paraspinous muscles. No pain to palpation over facets. No pain with neck flexion, extension, or lateral flexion.   PULM: No evidence of respiratory difficulty, symmetric chest rise.  GI:  Non-distended  BACK:  Limited range of motion. No pain to palpation over the spinous processes.  Pain with axial loading bilaterally.  Tenderness to palpation right SI  EXTREMITIES: No deformities, edema, or skin discoloration.   MUSCULOSKELETAL: Shoulder, hip, and knee provocative maneuvers are negative. No atrophy is noted.   NEURO: Sensation is equal and appropriate bilaterally. Bilateral upper and lower extremity strength is normal and symmetric. Bilateral upper and lower extremity coordination and muscle stretch reflexes are physiologic and symmetric. Plantar response are downgoing. Straight leg raising in the supine position is positive to radicular pain on right  GAIT:  normal.      LABS:    Lab Results   Component Value Date    HGBA1C 5.1 03/10/2025       Lab Results   Component Value Date    CREATININE 0.7 03/10/2025       ASSESSMENT: 79 y.o. year old female with pain, consistent with:    No diagnosis found.    DISCUSSION: Mireille Akins is a patient who comes to us with chronic lower back pain with radiculopathy      PLAN:  - I have stressed the importance of physical activity and a home exercise plan to help with pain and improve health.  - Patient can continue with medications for now since they are providing benefits, using them appropriately, and without side effects.  - Counseled patient regarding the importance of activity modification and constant sleeping habits.  - Anticoagulation use: Yes aspirin and Plavix  Interventions: Schedule for a Lumbar Epidural Steroid Injection at L4/5 to help with pain and progress with a home exercise program.. Explained the risks and benefits of the procedure in detail with the patient today in clinic along with alternative treatment options, and the patient elected to pursue the intervention at this time.   - Imaging: Reviewed available imaging with patient and answered any questions they had regarding study.  - The patient's pathophysiology was explained in detail with reference to x-rays, models, other visual aids as appropriate.   - Follow up visit: return to clinic after her procedure    Jose Marvin MD  06/24/2025

## 2025-06-24 NOTE — TELEPHONE ENCOUNTER
Refill Routing Note   Medication(s) are not appropriate for processing by [facility] for the following reason(s):        Patient not seen by provider within 15 months  Required vitals abnormal    [facility] action(s):  Defer               Appointments  past 12m or future 3m with PCP    Date Provider   Last Visit   1/12/2024 Tisha Guzman MD   Next Visit   Visit date not found Tisha Guzman MD   ED visits in past 90 days: 0        Note composed:9:14 AM 06/24/2025

## 2025-06-24 NOTE — TELEPHONE ENCOUNTER
----- Message from Jose Marvin MD sent at 2025  2:36 PM CDT -----  Regarding: Order for SOPHIE MEDRANO    Patient Name: SOPHIE MEDRANO([id number])  Sex: Female  : 1945      PCP: STANISLAW QUARLES    Center: [facility] CENTRAL BILLING OFFICE     Types of orders made on 2025: Procedure Request    Order Date:2025  Ordering User:JOSE MARVIN [[id number]]  Encounter Provider:Jose Marvin MD [[id number]]  Authorizing Provider: Jose Marvin MD [[id number]]  Department:[facility] PAIN MANAGEMENT[[id number]]    Common Order Information  Procedure -> Epidural Injection (specify level) Cmt: L4/5 (aim right)    Order Specific Information  Order: Procedure Request Order for Pain Management [Custom: [id number]]  Order #:          [id number]: 1 FUTURE    Priority: Routine  Class: Clinic Performed    Future Order Information      Expires on:2026            Expected by:2025                   Comment:Iv    Associated Diagnoses      M54.16 Lumbar radiculopathy      M51.362 Degeneration of intervertebral disc of lumbar region with       discogenic back pain and lower extremity pain      Physician -> Yon         Facility Name: -> [facility]           Priority: Routine  Class: Clinic Performed    Future Order Information      Expires on:2026            Expected by:2025                   Comment:Iv    Associated Diagnoses      M54.16 Lumbar radiculopathy      M51.362 Degeneration of intervertebral disc of lumbar region with       discogenic back pain and lower extremity pain      Procedure -> Epidural Injection (specify level) Cmt: L4/5 (aim right)        Physician -> Yon         Facility Name: -> [facility]

## 2025-06-25 ENCOUNTER — TELEPHONE (OUTPATIENT)
Dept: PAIN MEDICINE | Facility: CLINIC | Age: 80
End: 2025-06-25
Payer: MEDICARE

## 2025-06-25 RX ORDER — GABAPENTIN 300 MG/1
CAPSULE ORAL
Qty: 120 CAPSULE | Refills: 2 | Status: SHIPPED | OUTPATIENT
Start: 2025-06-25 | End: 2025-08-24

## 2025-06-25 RX ORDER — ESTRADIOL 2 MG/1
2 TABLET ORAL
Qty: 90 TABLET | Refills: 0 | OUTPATIENT
Start: 2025-06-25

## 2025-06-25 NOTE — TELEPHONE ENCOUNTER
----- Message from Teo Murry MD sent at 6/25/2025  3:15 PM CDT -----  Regarding: RE: Clearance  cleared  ----- Message -----  From: Franny Chin  Sent: 6/25/2025   9:55 AM CDT  To: Teo Murry MD; Lovely WIGGINS Staff  Subject: FW: Clearance                                    Good morning,    I am just letting you know we have rescheduled patient's injection to a sooner date July 3. We will need to know by Friday if she is cleared to hold her Plavix for 5 days prior.    ThanksFranny  ----- Message -----  From: Franny Chin  Sent: 6/24/2025   3:16 PM CDT  To: Teo Murry MD; Lovely WIGGINS Staff  Subject: Clearance                                        Good afternoon,    Patient is scheduled with Dr Marvin to have an HAROLDO L4/5 for July 15 on the wait list to have it sooner. Requesting clearance for patient to hold Plavix for 5 days prior.    ThanksFranny

## 2025-06-25 NOTE — TELEPHONE ENCOUNTER
Provider Staff:  Please note Refusal of medication.     Action required for this patient.      Requested Prescriptions     Refused Prescriptions Disp Refills    estradioL (ESTRACE) 2 MG tablet [Pharmacy Med Name: ESTRADIOL 2MG TABS] 90 tablet 0     Sig: TAKE ONE TABLET BY MOUTH EVERY DAY     Refused By: SUMIT COURTNEY     Reason for Refusal: Patient needs an appointment      Thanks!  [facility]   Note composed: 06/25/2025 12:01 PM

## 2025-06-26 ENCOUNTER — TELEPHONE (OUTPATIENT)
Dept: PAIN MEDICINE | Facility: CLINIC | Age: 80
End: 2025-06-26
Payer: MEDICARE

## 2025-06-27 ENCOUNTER — TELEPHONE (OUTPATIENT)
Dept: PAIN MEDICINE | Facility: CLINIC | Age: 80
End: 2025-06-27
Payer: MEDICARE

## 2025-06-27 NOTE — TELEPHONE ENCOUNTER
Copied from CRM #[id number]. Topic: General Inquiry - Patient Advice  >> Jun 27, 2025 10:56 AM Garrison wrote:  Who call ? Mireille Akins     What is the request Details : Pt calling to speak with someone in provider office regards discussing upcoming injection on 7/3.   Please call pt back.       Can clinic  use patient portal  : no     What number to call back : [phone]

## 2025-07-15 ENCOUNTER — OFFICE VISIT (OUTPATIENT)
Dept: FAMILY MEDICINE | Facility: CLINIC | Age: 80
End: 2025-07-15
Payer: MEDICARE

## 2025-07-15 VITALS
HEART RATE: 96 BPM | SYSTOLIC BLOOD PRESSURE: 128 MMHG | DIASTOLIC BLOOD PRESSURE: 62 MMHG | OXYGEN SATURATION: 98 % | BODY MASS INDEX: 23.99 KG/M2 | HEIGHT: 66 IN | WEIGHT: 149.25 LBS

## 2025-07-15 DIAGNOSIS — B02.29 POST HERPETIC NEURALGIA: Primary | ICD-10-CM

## 2025-07-15 PROCEDURE — 3078F DIAST BP <80 MM HG: CPT | Mod: CPTII,S$GLB,, | Performed by: FAMILY MEDICINE

## 2025-07-15 PROCEDURE — 99213 OFFICE O/P EST LOW 20 MIN: CPT | Mod: S$GLB,,, | Performed by: FAMILY MEDICINE

## 2025-07-15 PROCEDURE — 99999 PR PBB SHADOW E&M-EST. PATIENT-LVL III: CPT | Mod: PBBFAC,,, | Performed by: FAMILY MEDICINE

## 2025-07-15 PROCEDURE — 3074F SYST BP LT 130 MM HG: CPT | Mod: CPTII,S$GLB,, | Performed by: FAMILY MEDICINE

## 2025-07-15 PROCEDURE — G2211 COMPLEX E/M VISIT ADD ON: HCPCS | Mod: S$GLB,,, | Performed by: FAMILY MEDICINE

## 2025-07-15 PROCEDURE — 1160F RVW MEDS BY RX/DR IN RCRD: CPT | Mod: CPTII,S$GLB,, | Performed by: FAMILY MEDICINE

## 2025-07-15 PROCEDURE — 1159F MED LIST DOCD IN RCRD: CPT | Mod: CPTII,S$GLB,, | Performed by: FAMILY MEDICINE

## 2025-07-15 RX ORDER — CLOTRIMAZOLE AND BETAMETHASONE DIPROPIONATE 10; .64 MG/G; MG/G
CREAM TOPICAL
COMMUNITY
Start: 2025-07-03

## 2025-07-15 RX ORDER — PREGABALIN 50 MG/1
50 CAPSULE ORAL 3 TIMES DAILY PRN
Qty: 90 CAPSULE | Refills: 2 | Status: SHIPPED | OUTPATIENT
Start: 2025-07-15 | End: 2025-07-22 | Stop reason: SDUPTHER

## 2025-07-15 RX ORDER — TRIAMCINOLONE ACETONIDE 1 MG/G
CREAM TOPICAL
COMMUNITY
Start: 2025-06-26 | End: 2025-07-15

## 2025-07-15 RX ORDER — VALACYCLOVIR HYDROCHLORIDE 1 G/1
1000 TABLET, FILM COATED ORAL 3 TIMES DAILY
COMMUNITY
Start: 2025-06-26 | End: 2025-07-15

## 2025-07-15 NOTE — PROGRESS NOTES
"Subjective:         Patient ID: Mireille Akins is a 80 y.o. female.    Chief Complaint: Leg Pain    Patient Active Problem List   Diagnosis    Renal mass, right    Hormone replacement therapy (HRT)    Angiomyolipoma    Overweight with body mass index (BMI) of 25 to 25.9 in adult    Stress due to illness of family member-  - newly dx as diabetic-     Peripheral arterial disease with history of revascularization    Mixed hyperlipidemia    Myalgia due to statin    PAD (peripheral artery disease)    Traumatic open wound of right lower leg with delayed healing    Non-pressure chronic ulcer of other part of right foot with unspecified severity    Adnexal cyst    Gastroesophageal reflux disease without esophagitis      HPI    Mireille is a 80 y.o. female    History of Present Illness    CHIEF COMPLAINT:  Mireille presents today for shingles diagnosed in mid-June.    HISTORY OF PRESENT ILLNESS:  She first noticed shingles rash in mid-June and was evaluated by a dermatologist's PA, followed by the dermatologist the next week. She received treatment including a steroid injection, Valtrex, and triamcinolone cream. She was also prescribed CeraVe cream for itching. The existing lesions are drying out with no new lesion development. She reports severe pain localized to the right thigh and right buttock area, describing it as a deep burning sensation that feels like something is sticking her. The pain has significantly worsened since returning from vacation, characterized as "unbearable". Associated symptoms include chills, frequent bowel movements, and significant sleep disturbance resulting in complete lack of rest.    MEDICATIONS:  She was taking Gabapentin 300mg twice daily and 600mg at night but discontinued it two days ago due to severe balance problems and feeling unstable while walking, particularly at night when using the restroom.    MEDICAL HISTORY:  She has a history of back pain. She received a back " "injection in February prior to a trip to [facility] and took a steroid Dosepak in March for pain management flare. Currently, she denies back pain but reports "unbearable" pain in the right thigh and buttock area, which differs from her previous back pain presentation.    IMMUNIZATION HISTORY:  She received one shingles vaccine injection which caused significant adverse reaction, feeling unwell for 3-4 days post-vaccination. Despite vaccination, she developed shingles, which was noted to be not uncommon.    FUNCTIONAL STATUS:  She reports significant functional impairment with minimal household activities. After completing small tasks such as making tea, returning to her room, making her bed, and getting dressed, she must sit down due to exhaustion.         Objective:     Vitals:    07/15/25 1457   BP: 128/62   BP Location: Left arm   Patient Position: Sitting   Pulse: 96   SpO2: 98%   Weight: 67.7 kg (149 lb 4 oz)   Height: 5' 6" (1.676 m)         Physical Exam  Vitals and nursing note reviewed.   Constitutional:       General: She is not in acute distress.     Appearance: Normal appearance. She is not ill-appearing, toxic-appearing or diaphoretic.   HENT:      Head: Normocephalic and atraumatic.   Eyes:      General: No scleral icterus.     Conjunctiva/sclera: Conjunctivae normal.   Cardiovascular:      Rate and Rhythm: Normal rate.   Pulmonary:      Effort: Pulmonary effort is normal. No respiratory distress.   Skin:     Coloration: Skin is not pale.      Comments: Rash resolved largely with some residual post inflammatory hyperpigmentation, a few scabs, with no new lesions   Neurological:      Mental Status: She is alert. Mental status is at baseline.   Psychiatric:         Attention and Perception: Attention and perception normal.         Mood and Affect: Mood and affect normal.         Speech: Speech normal.         Behavior: Behavior normal.         Cognition and Memory: Cognition and memory normal.         " Judgment: Judgment normal.       Assessment:       1. Post herpetic neuralgia          Plan:   Recent relevant labs results reviewed with patient.         Assessment & Plan    Diagnosed with shingles in mid-June, completed course of Valacyclovir.  Rash has dried up with no new lesions, indicating virus is now dormant.  Current pain likely due to nerve damage caused by shingles virus, not ongoing viral activity.  Gabapentin trial unsuccessful due to side effects; discontinued gabapentin 2 days ago.  Considered age, petite stature, and medication sensitivity in dosing decision.  Steroids not indicated at this point  Anticipate 3-6 month recovery period based on typical cases or even longer.     - Prescribed Lyrica (pregabalin) 50 mg capsule to manage nerve pain, not to exceed 150 mg in 24 hours.  - Instructed to take 1 dose today, assess for drowsiness, and may take a second dose in the evening if needed, adjusting dosing based on pain relief and side effects.  - Discontinued gabapentin from medication list.  - Mireille was initially diagnosed with shingles in mid-June.  - Scheduled follow up in 1 week via video call or in-person appointment to assess pregabalin effectiveness and adjust treatment if necessary.  - Mireille instructed to contact the office if there are any issues obtaining the medication from the pharmacy.         1. Post herpetic neuralgia  -     Discontinue: pregabalin (LYRICA) 50 MG capsule; Take 1 capsule (50 mg total) by mouth 3 (three) times daily as needed (shingles related nerve pain).  Dispense: 90 capsule; Refill: 2      Patient's questions answered. Plan reviewed with patient at the end of visit. Relevant precautions to chief complaint and reasons to seek further medical care or to contact the office sooner reviewed with patient.     Follow up in about 1 week (around 7/22/2025) for shingles pain - Virtual.        Part of this note was dictated using voice recognition software. Please excuse  any typographical errors.     This note was generated with the assistance of ambient listening technology. Verbal consent was obtained by the patient and accompanying visitor(s) for the recording of patient appointment to facilitate this note. I attest to having reviewed and edited the generated note for accuracy, though some syntax or spelling errors may persist. Please contact the author of this note for any clarification.

## 2025-07-22 ENCOUNTER — OFFICE VISIT (OUTPATIENT)
Dept: FAMILY MEDICINE | Facility: CLINIC | Age: 80
End: 2025-07-22
Payer: MEDICARE

## 2025-07-22 DIAGNOSIS — B02.29 POST HERPETIC NEURALGIA: ICD-10-CM

## 2025-07-22 PROCEDURE — 1159F MED LIST DOCD IN RCRD: CPT | Mod: CPTII,95,, | Performed by: FAMILY MEDICINE

## 2025-07-22 PROCEDURE — 98005 SYNCH AUDIO-VIDEO EST LOW 20: CPT | Mod: 95,,, | Performed by: FAMILY MEDICINE

## 2025-07-22 PROCEDURE — 1160F RVW MEDS BY RX/DR IN RCRD: CPT | Mod: CPTII,95,, | Performed by: FAMILY MEDICINE

## 2025-07-22 PROCEDURE — G2211 COMPLEX E/M VISIT ADD ON: HCPCS | Mod: 95,,, | Performed by: FAMILY MEDICINE

## 2025-07-22 RX ORDER — PREGABALIN 50 MG/1
50 CAPSULE ORAL 3 TIMES DAILY PRN
Qty: 270 CAPSULE | Refills: 1 | Status: SHIPPED | OUTPATIENT
Start: 2025-07-22

## 2025-07-22 NOTE — PROGRESS NOTES
Subjective:       Patient ID: Mireille Akins is a 80 y.o. female.    Chief Complaint: Herpes Zoster    Patient Active Problem List   Diagnosis    Renal mass, right    Hormone replacement therapy (HRT)    Angiomyolipoma    Overweight with body mass index (BMI) of 25 to 25.9 in adult    Stress due to illness of family member-  - newly dx as diabetic-     Peripheral arterial disease with history of revascularization    Mixed hyperlipidemia    Myalgia due to statin    PAD (peripheral artery disease)    Traumatic open wound of right lower leg with delayed healing    Non-pressure chronic ulcer of other part of right foot with unspecified severity    Adnexal cyst    Gastroesophageal reflux disease without esophagitis      Rash  This is a recurrent problem. The current episode started more than 1 month ago. The problem has been rapidly improving since onset. The affected locations include the right upper leg. The rash is characterized by pain. She was exposed to nothing. Pertinent negatives include no anorexia, congestion, cough, diarrhea, eye pain, facial edema, fatigue, fever, joint pain, nail changes, rhinorrhea, shortness of breath, sore throat or vomiting. Past treatments include analgesics. The treatment provided mild relief. There is no history of allergies, asthma, eczema or varicella.       Mireille is a 80 y.o. female  History of Present Illness    CHIEF COMPLAINT:  Mireille presents today for follow up of shingles and associated nerve pain    SHINGLES AND NEUROPATHIC PAIN:  She reports ongoing shingles symptoms with persistent pins and needles sensation in leg. The rash is improving with slight pink circular lesions beginning to dissipate. She denies current itching. Her pain returns when medication wears off. Lyrica has provided better symptom management compared to previous treatment with gabapentin.    MEDICATIONS:  She takes Lyrica (pregabalin) 50 mg 3 times daily with consistent adherence.  She uses Tylenol at night for relaxation. She previously used Tylenol PM during a two-week period of insomnia. Gabapentin was discontinued after injection, which was previously used for chronic back pain.             Objective:   There were no vitals filed for this visit.      Physical Exam  Constitutional:       General: Patient is not in acute distress.     Appearance: Patient is well-developed. He is not diaphoretic.      Comments: Exam performed as able, but limited due to the inherent nature of telemedicine visit.    HENT:      Head: Normocephalic and atraumatic.   Eyes:      Conjunctiva/sclera: Conjunctivae normal.   Pulmonary:      Effort: No respiratory distress.      Comments: No audible wheezing  No visible respiratory distress  Musculoskeletal:      Cervical back: Normal range of motion.   Skin:     Findings: No rash.      Comments: No visible rash   Neurological:      Mental Status: Patient is alert. Mental status is at baseline.   Psychiatric:         Behavior: Behavior normal.         Thought Content: Thought content normal.       Assessment:       1. Post herpetic neuralgia          Plan:   Recent relevant labs results reviewed with patient.         Assessment & Plan    Assessed condition as herpetic neuralgia following shingles, with nerve damage and ongoing recovery.  Noted positive response to Lyrica (pregabalin), more effective than previously prescribed gabapentin.  Evaluated options for medication management, including maintaining current dosage or adjusting to a higher dose with reduced frequency.  Discussed potential long-term use of Lyrica for chronic back pain management, as it replaces previous gabapentin regimen if needed but may not require.   Reassess in 2-3 months.    FOLLOW-UP:  - Scheduled follow up in September to reassess Lyrica efficacy and need for continuation.         1. Post herpetic neuralgia  -     pregabalin (LYRICA) 50 MG capsule; Take 1 capsule (50 mg total) by mouth 3  (three) times daily as needed (shingles related nerve pain).  Dispense: 270 capsule; Refill: 1      Patient's questions answered. Plan reviewed with patient at the end of visit. Relevant precautions to chief complaint and reasons to seek further medical care or to contact the office sooner reviewed with patient.     Future Appointments   Date Time Provider Department Center   7/28/2025 10:15 AM LAB, [facility] [place]   8/4/2025  3:00 PM Fernando Mayorga MD PhD [facility] PERVASC [place]   8/28/2025  1:15 PM VASCULAR, LAB [facility] VASCL[facility] [place]   8/28/2025  2:00 PM Teo Murry MD [facility] VASCSUR [facility] [address]   9/23/2025  9:40 AM Haleigh Seo MD [facility] FAM MED [place]   10/30/2025  9:45 AM Diamond Desai MD [facility] [place]         Part of this note was dictated using voice recognition software. Please excuse any typographical errors.     The patient location is: [place]  The chief complaint leading to consultation is: Herpes Zoster       Visit type: audiovisual    Face to Face time with patient:7  10 minutes of total time spent on the encounter, which includes face to face time and non-face to face time preparing to see the patient (eg, review of tests), Obtaining and/or reviewing separately obtained history, Documenting clinical information in the electronic or other health record, Independently interpreting results (not separately reported) and communicating results to the patient/family/caregiver, or Care coordination (not separately reported).     Each patient to whom he or she provides medical services by telemedicine is:  (1) informed of the relationship between the physician and patient and the respective role of any other health care provider with respect to management of the patient; and (2) notified that he or she may decline to receive medical services by telemedicine and may withdraw from such care at any time.    This note was generated with the assistance of ambient listening technology.  Verbal consent was obtained by the patient and accompanying visitor(s) for the recording of patient appointment to facilitate this note. I attest to having reviewed and edited the generated note for accuracy, though some syntax or spelling errors may persist. Please contact the author of this note for any clarification.

## 2025-07-28 ENCOUNTER — LAB VISIT (OUTPATIENT)
Dept: LAB | Facility: HOSPITAL | Age: 80
End: 2025-07-28
Attending: INTERNAL MEDICINE
Payer: MEDICARE

## 2025-07-28 DIAGNOSIS — I73.9 PERIPHERAL ARTERIAL DISEASE WITH HISTORY OF REVASCULARIZATION: ICD-10-CM

## 2025-07-28 DIAGNOSIS — E78.2 MIXED HYPERLIPIDEMIA: ICD-10-CM

## 2025-07-28 DIAGNOSIS — Z98.890 PERIPHERAL ARTERIAL DISEASE WITH HISTORY OF REVASCULARIZATION: ICD-10-CM

## 2025-07-28 LAB
ALBUMIN SERPL BCP-MCNC: 3.6 G/DL (ref 3.5–5.2)
ALP SERPL-CCNC: 66 UNIT/L (ref 40–150)
ALT SERPL W/O P-5'-P-CCNC: 16 UNIT/L (ref 0–55)
ANION GAP (OHS): 6 MMOL/L (ref 8–16)
AST SERPL-CCNC: 28 UNIT/L (ref 0–50)
BILIRUB SERPL-MCNC: 0.3 MG/DL (ref 0.1–1)
BUN SERPL-MCNC: 12 MG/DL (ref 8–23)
CALCIUM SERPL-MCNC: 9 MG/DL (ref 8.7–10.5)
CHLORIDE SERPL-SCNC: 105 MMOL/L (ref 95–110)
CHOLEST SERPL-MCNC: 216 MG/DL (ref 120–199)
CHOLEST/HDLC SERPL: 2.9 {RATIO} (ref 2–5)
CO2 SERPL-SCNC: 28 MMOL/L (ref 23–29)
CREAT SERPL-MCNC: 0.8 MG/DL (ref 0.5–1.4)
GFR SERPLBLD CREATININE-BSD FMLA CKD-EPI: >60 ML/MIN/1.73/M2
GLUCOSE SERPL-MCNC: 88 MG/DL (ref 70–110)
HDLC SERPL-MCNC: 74 MG/DL (ref 40–75)
HDLC SERPL: 34.3 % (ref 20–50)
LDLC SERPL CALC-MCNC: 126.4 MG/DL (ref 63–159)
NONHDLC SERPL-MCNC: 142 MG/DL
POTASSIUM SERPL-SCNC: 4.7 MMOL/L (ref 3.5–5.1)
PROT SERPL-MCNC: 6.5 GM/DL (ref 6–8.4)
SODIUM SERPL-SCNC: 139 MMOL/L (ref 136–145)
TRIGL SERPL-MCNC: 78 MG/DL (ref 30–150)

## 2025-07-28 PROCEDURE — 82465 ASSAY BLD/SERUM CHOLESTEROL: CPT

## 2025-07-28 PROCEDURE — 80053 COMPREHEN METABOLIC PANEL: CPT

## 2025-07-28 PROCEDURE — 36415 COLL VENOUS BLD VENIPUNCTURE: CPT | Mod: PO

## 2025-08-04 ENCOUNTER — OFFICE VISIT (OUTPATIENT)
Dept: CARDIOLOGY | Facility: CLINIC | Age: 80
End: 2025-08-04
Payer: MEDICARE

## 2025-08-04 VITALS
SYSTOLIC BLOOD PRESSURE: 133 MMHG | HEART RATE: 77 BPM | WEIGHT: 161.38 LBS | DIASTOLIC BLOOD PRESSURE: 61 MMHG | BODY MASS INDEX: 25.94 KG/M2 | HEIGHT: 66 IN

## 2025-08-04 DIAGNOSIS — I73.9 PERIPHERAL ARTERIAL DISEASE WITH HISTORY OF REVASCULARIZATION: ICD-10-CM

## 2025-08-04 DIAGNOSIS — E78.2 MIXED HYPERLIPIDEMIA: ICD-10-CM

## 2025-08-04 DIAGNOSIS — I73.9 PAD (PERIPHERAL ARTERY DISEASE): Primary | ICD-10-CM

## 2025-08-04 DIAGNOSIS — Z98.890 PERIPHERAL ARTERIAL DISEASE WITH HISTORY OF REVASCULARIZATION: ICD-10-CM

## 2025-08-04 DIAGNOSIS — E66.3 OVERWEIGHT WITH BODY MASS INDEX (BMI) OF 25 TO 25.9 IN ADULT: ICD-10-CM

## 2025-08-04 PROCEDURE — 99999 PR PBB SHADOW E&M-EST. PATIENT-LVL III: CPT | Mod: PBBFAC,,, | Performed by: INTERNAL MEDICINE

## 2025-08-04 PROCEDURE — 99215 OFFICE O/P EST HI 40 MIN: CPT | Mod: S$GLB,,, | Performed by: INTERNAL MEDICINE

## 2025-08-04 PROCEDURE — 1126F AMNT PAIN NOTED NONE PRSNT: CPT | Mod: CPTII,S$GLB,, | Performed by: INTERNAL MEDICINE

## 2025-08-04 PROCEDURE — 1159F MED LIST DOCD IN RCRD: CPT | Mod: CPTII,S$GLB,, | Performed by: INTERNAL MEDICINE

## 2025-08-04 PROCEDURE — 1101F PT FALLS ASSESS-DOCD LE1/YR: CPT | Mod: CPTII,S$GLB,, | Performed by: INTERNAL MEDICINE

## 2025-08-04 PROCEDURE — 3078F DIAST BP <80 MM HG: CPT | Mod: CPTII,S$GLB,, | Performed by: INTERNAL MEDICINE

## 2025-08-04 PROCEDURE — 3075F SYST BP GE 130 - 139MM HG: CPT | Mod: CPTII,S$GLB,, | Performed by: INTERNAL MEDICINE

## 2025-08-04 PROCEDURE — 3288F FALL RISK ASSESSMENT DOCD: CPT | Mod: CPTII,S$GLB,, | Performed by: INTERNAL MEDICINE

## 2025-08-04 PROCEDURE — G2211 COMPLEX E/M VISIT ADD ON: HCPCS | Mod: S$GLB,,, | Performed by: INTERNAL MEDICINE

## 2025-08-04 NOTE — PROGRESS NOTES
[facility]      CC: PAD      Patient ID: Mireille Akins is a 80 y.o. female with PAD s/p right SFA PTA/stent placement, overweight, who presents for a follow up appointment. Pertinent history/events are as follows:     -Pt kindly referred by Dr. Cedeon for evaluation of Peripheral arterial disease.    -At our initial clinic visit on 5/1/2024, Mrs. Akins reports no claudication symptoms or tissue loss.  She reports significant bruising of arms and legs since starting ASA and plavix.  She reports no rash.    Plan:  PAD s/p right SFA PTA/stent placement- Mrs. Akins reported no claudication symptoms or tissue loss.  She reports significant bruising of arms and legs since starting ASA and plavix.  She reports no rash. Pt to start walking program with goal of at least 30 minutes a day 5 days per week.  Continue ASA/Plavix and atorvastatin 40 mg daily.  Check updated lipids with goal LDL of <70.  Follow up with Dr. Murry as scheduled.    Overweight- Encourage diet, exercise, and weight loss.    -At follow up clinic visit on 11/15/2024, Mrs. Akins reported no claudication symptoms or tissue loss.  She reports a recent trip to [place] where she did a lot of walking. She was unable to tolerate statins due to muscle pain. BLE Arterial Ultrasound on 9/10/2024 revealed patent prox/mid SFA stent with doubling of the velocity noted suggesting more than 50% stenosis in the mid right SFA. Hemodynamically significant stenosis in the mid left SFA more than 50%, significant hemodynamic stenosis in the distal left SFA 70-90%. Occluded left anterior tibial artery, collaterals noted to the left DP.  Plan:  PAD s/p right SFA PTA/stent placement- Mrs. Akins reports no claudication symptoms or tissue loss. She was unable to tolerate statins due to muscle pain. BLE Arterial Ultrasound on 9/10/2024 revealed patent prox/mid SFA stent with doubling of the velocity noted suggesting more than 50% stenosis in  the mid right SFA. Hemodynamically significant stenosis in the mid left SFA more than 50%, significant hemodynamic stenosis in the distal left SFA 70-90%. Occluded left anterior tibial artery, collaterals noted to the left DP. Continue walking program with goal of at least 30 minutes a day 5 days per week.Follow up with repeat ultrasound in 3 months with Dr. Murry. Given statin intolerance, will start bempedoic acid-zetia.   Overweight- Encourage diet, exercise, and weight loss.    -2/19/2025 clinic visit: Mrs. Akins reports no claudication symptoms or tissue loss.  Main issue currently is back pain. She is tolerating bempedoic acid-zetia 180-10 mg daily with no muscle pain or other issues. She has lost weight with BMI now 24.55. LDL 91 on 4/22/2024. BLE Arterial Ultrasound on 1/7/2025 revealed mild atherosclerosis of right common femoral artery. Patent stent in mid to distal right superficial femoral artery without any significant stenosis. Three-vessel runoff to the right foot. Mild atherosclerosis of the left superficial femoral artery. Occluded left posterior tibial artery with distal vessel supplied via collateral  vessels.  Plan:  PAD s/p right SFA PTA/stent placement- Mrs. Akins reports no claudication symptoms or tissue loss.  Main issue currently is back pain. She is tolerating bempedoic acid-zetia 180-10 mg daily with no muscle pain or other issues. LDL 91 on 4/22/2024. BLE Arterial Ultrasound on 1/7/2025 revealed mild atherosclerosis of right common femoral artery. Patent stent in mid to distal right superficial femoral artery without any significant stenosis. Three-vessel runoff to the right foot. Mild atherosclerosis of the left superficial femoral artery. Occluded left posterior tibial artery with distal vessel supplied via collateral vessels.  Continue bempedoic acid-zetia 180-10 mg daily, ASA 81 mg daily, and plavix 75 mg daily. Continue walking program with goal of at least 30 minutes a day 5  "days per week. Follow up with with Dr. Murry for repeat imaging as scheduled.      HPI:  Mrs. Akins reports no claudication symptoms or tissue loss.  Main issue currently is recent bout with shingles. Back pain is significantly improved. On 2025, she was seen by Dr. Murry with the following plan: "She had ABIs today which show the right to be 0.85 and mild disease. She will have her walk we will have her keep going we will keep a close eye on her and bring her back in 3 months with ABIs and PVRs.  on 2025.     Past Medical History:   Diagnosis Date    Angiomyolipoma 2022    Asthma     last attack 15 years ago    Gastroesophageal reflux disease without esophagitis 2025    PONV (postoperative nausea and vomiting)     Subclinical hypothyroidism      Past Surgical History:   Procedure Laterality Date    ANGIOGRAM, EXTREMITY, UNILATERAL Right 2024    Procedure: ANGIOGRAM, EXTREMITY, UNILATERAL, PTA ,;  Surgeon: Teo Murry MD;  Location: [facility] OR [address] [place] [address];  Service: Vascular;  Laterality: Right;  8.5 min  355.30 mGy  73.2661 Gy.cm  96ml Dye    ANGIOGRAPHY OF LOWER EXTREMITY Right 3/11/2025    Procedure: Angiogram Extremity Unilateral;  Surgeon: Teo Murry MD;  Location: [facility] OR [address] [place] [address];  Service: Vascular;  Laterality: Right;  left femoral artery approach    mGy 122.08  Gycms2 23.8474  Fluro time 6.7 min  Contrast 28mL    BACK SURGERY       SECTION      CHOLECYSTECTOMY      HYSTERECTOMY      INJECTION, SPINE, LUMBOSACRAL, TRANSFORAMINAL APPROACH Right 2025    Procedure: Right L3/4 and L4/5 TFESI;  Surgeon: Jose Marvin MD;  Location: [facility] PAIN [facility]NAGEMENT;  Service: Pain Management;  Laterality: Right;  Plavix 5 days and ASA 3 days    knee scoped      PTA, SUPERFICIAL FEMORAL ARTERY Right 3/11/2025    Procedure: PTA, SUPERFICIAL FEMORAL ARTERY;  Surgeon: Teo Murry MD;  Location: [facility] OR [address] [place] [address];  Service: Vascular;  Laterality: Right;    rotator cuff " surgery Right     STENT, SUPERFICIAL FEMORAL ARTERY Right 2/21/2024    Procedure: STENT, SUPERFICIAL FEMORAL ARTERY;  Surgeon: Teo Murry MD;  Location: [facility] OR [address] [place] [address];  Service: Vascular;  Laterality: Right;    TONSILLECTOMY       Social History     Socioeconomic History    Marital status:    Tobacco Use    Smoking status: Former     Types: Cigarettes     Start date: 6/2/1965     Passive exposure: Past    Smokeless tobacco: Never    Tobacco comments:     quit at age 20   Substance and Sexual Activity    Alcohol use: Not Currently    Drug use: No    Sexual activity: Yes     Partners: Male     Birth control/protection: See Surgical Hx     Social Drivers of Health     Financial Resource Strain: Patient Declined (7/22/2025)    Overall Financial Resource Strain (CARDIA)     Difficulty of Paying Living Expenses: Patient declined   Food Insecurity: Patient Declined (7/22/2025)    Hunger Vital Sign     Worried About Running Out of Food in the Last Year: Patient declined     Ran Out of Food in the Last Year: Patient declined   Transportation Needs: Patient Declined (7/22/2025)    PRAPARE - Transportation     Lack of Transportation (Medical): Patient declined     Lack of Transportation (Non-Medical): Patient declined   Physical Activity: Patient Declined (7/22/2025)    Exercise Vital Sign     Days of Exercise per Week: Patient declined     Minutes of Exercise per Session: Patient declined   Stress: Stress Concern Present (7/22/2025)    [employer] - Occupational Stress Questionnaire     Feeling of Stress : To some extent   Housing Stability: Unknown (7/22/2025)    Housing Stability Vital Sign     Homeless in the Last Year: Patient declined     Family History   Problem Relation Name Age of Onset    Heart disease Mother      Prostate cancer Neg Hx      Kidney disease Neg Hx      Breast cancer Neg Hx      Colon cancer Neg Hx      Ovarian cancer Neg Hx         Review of patient's  "allergies indicates:  No Known Allergies    Medication List with Changes/Refills   Current Medications    ASPIRIN (ECOTRIN) 81 MG EC TABLET    Take 1 tablet (81 mg total) by mouth once daily.    BEMPEDOIC ACID-EZETIMIBE 180-10 MG TAB    Take 1 tablet daily.    CLOPIDOGREL (PLAVIX) 75 MG TABLET    Take 1 tablet (75 mg total) by mouth once daily.    CLOTRIMAZOLE-BETAMETHASONE 1-0.05% (LOTRISONE) CREAM    Apply topically.    ESTRADIOL (ESTRACE) 2 MG TABLET    TAKE ONE TABLET BY MOUTH EVERY DAY    MULTIVITAMIN W-MINERALS/LUTEIN (CENTRUM SILVER ORAL)    Take 1 tablet by mouth once daily.    PANTOPRAZOLE (PROTONIX) 20 MG TABLET    Take 1 tablet (20 mg total) by mouth once daily.    PREGABALIN (LYRICA) 50 MG CAPSULE    Take 1 capsule (50 mg total) by mouth 3 (three) times daily as needed (shingles related nerve pain).       Review of Systems  Constitution: Denies chills, fever, and sweats.  HENT: Denies headaches or blurry vision.  Cardiovascular: Denies chest pain or irregular heart beat.  Respiratory: Denies cough or shortness of breath.  Gastrointestinal: Denies abdominal pain, nausea, or vomiting.  Musculoskeletal: Denies muscle cramps.  Neurological: Denies dizziness or focal weakness.  Psychiatric/Behavioral: Normal mental status.  Hematologic/Lymphatic: Denies bleeding problem or easy bruising/bleeding.  Skin: Denies rash or suspicious lesions    Physical Examination  /61   Pulse 77   Ht 5' 6" (1.676 m)   Wt 73.2 kg (161 lb 6 oz)   LMP  (LMP Unknown)   BMI 26.05 kg/m²     Constitutional: No acute distress, conversant  HEENT: Sclera anicteric, Pupils equal, round and reactive to light, extraocular motions intact, Oropharynx clear  Neck: No JVD, no carotid bruits  Cardiovascular: regular rate and rhythm, no murmur, rubs or gallops, normal S1/S2  Pulmonary: Clear to auscultation bilaterally  Abdominal: Abdomen soft, nontender, nondistended, positive bowel sounds  Extremities: No lower extremity edema, "   Pulses:  Carotid pulses are 2+ on the right side, and 2+ on the left side.  Radial pulses are 2+ on the right side, and 2+ on the left side.   Femoral pulses are 2+ on the right side, and 2+ on the left side.  Popliteal pulses are 2+ on the right side, and 2+ on the left side.   Dorsalis pedis pulses are 2+ on the right side, and 2+ on the left side.   Posterior tibial pulses are 2+ on the right side, and 2+ on the left side.    Skin: No ecchymosis, erythema, or ulcers  Psych: Alert and oriented x 3, appropriate affect  Neuro: CNII-XII intact, no focal deficits    Labs:  Most Recent Data  CBC:   Lab Results   Component Value Date    WBC 4.70 03/10/2025    HGB 12.9 03/10/2025    HCT 38.0 03/10/2025     03/10/2025    MCV 97 03/10/2025    RDW 13.7 03/10/2025     BMP:   Lab Results   Component Value Date     07/28/2025    K 4.7 07/28/2025     07/28/2025    CO2 28 07/28/2025    BUN 12 07/28/2025    CREATININE 0.8 07/28/2025    GLU 88 07/28/2025    CALCIUM 9.0 07/28/2025    MG 1.6 08/07/2014    PHOS 3.6 03/10/2025     LFTS;   Lab Results   Component Value Date    PROT 6.5 07/28/2025    ALBUMIN 3.6 07/28/2025    BILITOT 0.3 07/28/2025    AST 28 07/28/2025    ALKPHOS 66 07/28/2025    ALT 16 07/28/2025     COAGS:   Lab Results   Component Value Date    INR 0.9 05/29/2023     FLP:   Lab Results   Component Value Date    CHOL 216 (H) 07/28/2025    HDL 74 07/28/2025    LDLCALC 126.4 07/28/2025    TRIG 78 07/28/2025    CHOLHDL 34.3 07/28/2025     CARDIAC:   Lab Results   Component Value Date    TROPONINI 0.006 07/09/2014       Imaging:    JOHNSON Study 3/25/2025:  Right Leg: Segmental Pressures suggest moderate peripheral arterial occlusive disease. However, PVR waveforms suggest minimal   peripheral arterial occlusive disease.   Right JOHNSON 0.85    Left Leg: Segmental pressures and PVR waveforms suggest minimal peripheral arterial occlusive disease.   Left JOHNSON 0.96    BLE Arterial Ultrasound 1/7/2025:    Mild  "atherosclerosis of right common femoral artery    Patent stent in mid to distal right superficial femoral artery without any significant stenosis.    Three-vessel runoff to the right foot    Mild atherosclerosis of the left superficial femoral artery    Occluded left posterior tibial artery with distal vessel supplied via collateral  vessels    BLE Arterial Ultrasound 6/13/2024  Focal area of greater than 50% stenosis at the distal right superficial femoral artery.   Patent right superficial femoral artery stent.    BLE Arterial Ultrasound 9/10/2024:    Patent prox/mid SFA stent with doubling of the velocity noted suggesting more than 50% stenosis in the mid right SFA    Hemodynamically significant stenosis in the mid left SFA more than 50%, significant hemodynamic stenosis in the distal left SFA 70-90%    Occluded left anterior tibial artery, collaterals noted to the left DP    JOHNSON Study 3/5/2024:    Right JOHNSON 0.91    Left JOHNSON 0.90    LLE Arterial Ultrasound 3/5/2024:    Diffuse arthrosclerosis    Borderline doubling velocity in the mid left SFA suggest possible 50% stenosis    Occluded left peroneal    Severe damping velocity left posterior tibial artery suggest significant stenosis     Assessment/Plan:  Mireille Akins is a 80 y.o. female with PAD s/p right SFA PTA/stent placement, who presents for a follow up appointment.    PAD s/p right SFA PTA/stent placement- Mrs. Akins reports no claudication symptoms or tissue loss.  Main issue currently is recent bout with shingles. Back pain is significantly improved. On 4/28/2025, she was seen by Dr. Murry with the following plan: "She had ABIs today which show the right to be 0.85 and mild disease. She will have her walk we will have her keep going we will keep a close eye on her and bring her back in 3 months with ABIs and PVRs.  on 7/28/2025. BLE Arterial Ultrasound on 1/7/2025 revealed mild atherosclerosis of right common femoral artery. Patent " stent in mid to distal right superficial femoral artery without any significant stenosis. Three-vessel runoff to the right foot. Mild atherosclerosis of the left superficial femoral artery. Occluded left posterior tibial artery with distal vessel supplied via collateral vessels.  Continue bempedoic acid-zetia 180-10 mg daily, ASA 81 mg daily, and plavix 75 mg daily. Continue walking program with goal of at least 30 minutes a day 5 days per week. Follow up with with Dr. Murry for repeat imaging as scheduled.      Follow up in 6 months with lipids and cmp prior    Total duration of face to face visit time 25 minutes.  Total time spent counseling greater than fifty percent of total visit time.  Counseling included discussion regarding imaging findings, diagnosis, possibilities, treatment options, risks and benefits.  The patient had many questions regarding the options and long-term effects.    Visit today included increased complexity associated with the care of the episodic problem(s) addressed above in addition to managing the longitudinal care of the patient due to the serious and/or complex managed problem(s) listed above.    Fernando Mayorga MD, PhD  Interventional Cardiology

## 2025-08-04 NOTE — PATIENT INSTRUCTIONS
"Assessment/Plan:  Mireille Akins is a 80 y.o. female with PAD s/p right SFA PTA/stent placement, who presents for a follow up appointment.    PAD s/p right SFA PTA/stent placement- Mrs. Akins reports no claudication symptoms or tissue loss.  Main issue currently is recent bout with shingles. Back pain is significantly improved. On 4/28/2025, she was seen by Dr. Murry with the following plan: "She had ABIs today which show the right to be 0.85 and mild disease. She will have her walk we will have her keep going we will keep a close eye on her and bring her back in 3 months with ABIs and PVRs.  on 7/28/2025. BLE Arterial Ultrasound on 1/7/2025 revealed mild atherosclerosis of right common femoral artery. Patent stent in mid to distal right superficial femoral artery without any significant stenosis. Three-vessel runoff to the right foot. Mild atherosclerosis of the left superficial femoral artery. Occluded left posterior tibial artery with distal vessel supplied via collateral vessels.  Continue bempedoic acid-zetia 180-10 mg daily, ASA 81 mg daily, and plavix 75 mg daily. Continue walking program with goal of at least 30 minutes a day 5 days per week. Follow up with with Dr. Murry for repeat imaging as scheduled.      "

## 2025-08-18 DIAGNOSIS — Z98.890 PERIPHERAL ARTERIAL DISEASE WITH HISTORY OF REVASCULARIZATION: ICD-10-CM

## 2025-08-18 DIAGNOSIS — I73.9 PERIPHERAL ARTERIAL DISEASE WITH HISTORY OF REVASCULARIZATION: ICD-10-CM

## 2025-08-18 RX ORDER — CLOPIDOGREL BISULFATE 75 MG/1
75 TABLET ORAL DAILY
Qty: 90 TABLET | Refills: 1 | Status: SHIPPED | OUTPATIENT
Start: 2025-08-18 | End: 2026-02-14

## 2025-08-28 ENCOUNTER — OFFICE VISIT (OUTPATIENT)
Dept: VASCULAR SURGERY | Facility: CLINIC | Age: 80
End: 2025-08-28
Attending: SURGERY
Payer: MEDICARE

## 2025-08-28 ENCOUNTER — HOSPITAL ENCOUNTER (OUTPATIENT)
Dept: VASCULAR SURGERY | Facility: CLINIC | Age: 80
Discharge: HOME | End: 2025-08-28
Attending: SURGERY
Payer: MEDICARE

## 2025-08-28 VITALS
DIASTOLIC BLOOD PRESSURE: 81 MMHG | WEIGHT: 160.94 LBS | HEART RATE: 74 BPM | SYSTOLIC BLOOD PRESSURE: 146 MMHG | TEMPERATURE: 98 F | HEIGHT: 66 IN | BODY MASS INDEX: 25.86 KG/M2

## 2025-08-28 DIAGNOSIS — I73.9 PAD (PERIPHERAL ARTERY DISEASE): Primary | ICD-10-CM

## 2025-08-28 DIAGNOSIS — I73.9 PAD (PERIPHERAL ARTERY DISEASE): ICD-10-CM

## 2025-08-28 PROCEDURE — 99213 OFFICE O/P EST LOW 20 MIN: CPT | Mod: S$GLB,,, | Performed by: SURGERY

## 2025-08-28 PROCEDURE — 3288F FALL RISK ASSESSMENT DOCD: CPT | Mod: CPTII,S$GLB,, | Performed by: SURGERY

## 2025-08-28 PROCEDURE — 3077F SYST BP >= 140 MM HG: CPT | Mod: CPTII,S$GLB,, | Performed by: SURGERY

## 2025-08-28 PROCEDURE — 1126F AMNT PAIN NOTED NONE PRSNT: CPT | Mod: CPTII,S$GLB,, | Performed by: SURGERY

## 2025-08-28 PROCEDURE — 1101F PT FALLS ASSESS-DOCD LE1/YR: CPT | Mod: CPTII,S$GLB,, | Performed by: SURGERY

## 2025-08-28 PROCEDURE — 99999 PR PBB SHADOW E&M-EST. PATIENT-LVL III: CPT | Mod: PBBFAC,,, | Performed by: SURGERY

## 2025-08-28 PROCEDURE — 1159F MED LIST DOCD IN RCRD: CPT | Mod: CPTII,S$GLB,, | Performed by: SURGERY

## 2025-08-28 PROCEDURE — 3079F DIAST BP 80-89 MM HG: CPT | Mod: CPTII,S$GLB,, | Performed by: SURGERY

## (undated) DEVICE — Device

## (undated) DEVICE — GUIDEWIRE V-18 CONTROL .18

## (undated) DEVICE — CATH SEEKER IV CS .018IN 135CM

## (undated) DEVICE — INTRODUCER VASC RADPQ 6FRX10CM

## (undated) DEVICE — GAUZE WOVEN STRL 12-PLY 4X4IN

## (undated) DEVICE — SYS LABEL CORRECT MED

## (undated) DEVICE — COVER INSTR ELASTIC BAND 40X20

## (undated) DEVICE — TUBING HIGH PRESSURE

## (undated) DEVICE — SET MICROPUNCTURE

## (undated) DEVICE — DRESSING TRANS 4X4 TEGADERM

## (undated) DEVICE — DEVICE MYNX GRIP 6/7F

## (undated) DEVICE — INTRODUCER VASC RADPQ 5FRX10CM

## (undated) DEVICE — VISE RADIFOCUS MULTI TORQUE

## (undated) DEVICE — CATH 5FR OMNIFLUSH 65CM .038

## (undated) DEVICE — CATH ULTRAVERSE 035 4X80X130

## (undated) DEVICE — SHEATH ANSEL FLEXOR 5FRX45CM

## (undated) DEVICE — CATH KUMPE ACCESS 5F 65CM

## (undated) DEVICE — SOL NS 1000CC

## (undated) DEVICE — GUIDEWIRE STF .035X260CM ANG

## (undated) DEVICE — INFLATOR ENCORE

## (undated) DEVICE — CATH OMNI FLUSH 4FR 65CM

## (undated) DEVICE — DECANTER FLUID TRNSF WHITE 9IN

## (undated) DEVICE — DRAPE U SPLIT SHEET 54X76IN

## (undated) DEVICE — KIT INTRO MICRO NIT VSI 4FR

## (undated) DEVICE — SOL NACL 0.9% IV INJ 1000ML

## (undated) DEVICE — COVERS PROBE NR-48 STERILE

## (undated) DEVICE — SYR MED RAD 150ML

## (undated) DEVICE — DEVICE CLOSURE MYNX GRIP 5FR

## (undated) DEVICE — CONTRAST FLEXCIL INJECTION

## (undated) DEVICE — SHEATH GUID FLX ANSEL 6FR 45CM

## (undated) DEVICE — SOL 9P NACL IRR PIC IL